# Patient Record
Sex: MALE | Race: WHITE | NOT HISPANIC OR LATINO | Employment: OTHER | URBAN - METROPOLITAN AREA
[De-identification: names, ages, dates, MRNs, and addresses within clinical notes are randomized per-mention and may not be internally consistent; named-entity substitution may affect disease eponyms.]

---

## 2017-01-23 DIAGNOSIS — J38.7 LARYNX DISORDER: Primary | ICD-10-CM

## 2017-05-23 ENCOUNTER — TRANSFERRED RECORDS (OUTPATIENT)
Dept: HEALTH INFORMATION MANAGEMENT | Facility: CLINIC | Age: 62
End: 2017-05-23

## 2017-05-23 ENCOUNTER — RECORDS - HEALTHEAST (OUTPATIENT)
Dept: LAB | Facility: CLINIC | Age: 62
End: 2017-05-23

## 2017-05-23 LAB
ALT SERPL-CCNC: 29 U/L (ref 0–45)
AST SERPL-CCNC: 33 U/L (ref 0–40)
CHOLEST SERPL-MCNC: 157 MG/DL
CREAT SERPL-MCNC: 1.11 MG/DL (ref 0.7–1.3)
FASTING STATUS PATIENT QL REPORTED: YES
GFR SERPL CREATININE-BSD FRML MDRD: >60 ML/MIN/1.73M2
GLUCOSE SERPL-MCNC: 85 MG/DL (ref 70–125)
HDLC SERPL-MCNC: 47 MG/DL
LDLC SERPL CALC-MCNC: 98 MG/DL
POTASSIUM SERPL-SCNC: 5.2 MMOL/L (ref 3.5–5)
PSA SERPL-MCNC: 0.4 NG/ML (ref 0–4.5)
TRIGL SERPL-MCNC: 60 MG/DL

## 2017-09-13 DIAGNOSIS — J38.7: Primary | ICD-10-CM

## 2017-10-02 ENCOUNTER — TRANSFERRED RECORDS (OUTPATIENT)
Dept: HEALTH INFORMATION MANAGEMENT | Facility: CLINIC | Age: 62
End: 2017-10-02

## 2017-10-20 ENCOUNTER — TRANSFERRED RECORDS (OUTPATIENT)
Dept: HEALTH INFORMATION MANAGEMENT | Facility: CLINIC | Age: 62
End: 2017-10-20

## 2017-11-21 ENCOUNTER — OFFICE VISIT (OUTPATIENT)
Dept: PEDIATRICS | Facility: CLINIC | Age: 62
End: 2017-11-21
Payer: COMMERCIAL

## 2017-11-21 VITALS
WEIGHT: 148 LBS | OXYGEN SATURATION: 98 % | DIASTOLIC BLOOD PRESSURE: 70 MMHG | SYSTOLIC BLOOD PRESSURE: 118 MMHG | HEIGHT: 62 IN | BODY MASS INDEX: 27.23 KG/M2 | HEART RATE: 63 BPM

## 2017-11-21 DIAGNOSIS — E78.5 HYPERLIPIDEMIA LDL GOAL <130: ICD-10-CM

## 2017-11-21 DIAGNOSIS — I10 BENIGN ESSENTIAL HYPERTENSION: Primary | ICD-10-CM

## 2017-11-21 PROCEDURE — 99214 OFFICE O/P EST MOD 30 MIN: CPT | Performed by: INTERNAL MEDICINE

## 2017-11-21 NOTE — MR AVS SNAPSHOT
"              After Visit Summary   11/21/2017    Mickey Borden    MRN: [id number]           Patient Information     Date Of Birth          1955        Visit Information        Provider Department      11/21/2017 7:30 AM Gallito Barba MD [facility] [place]        Today's Diagnoses     Benign essential hypertension    -  1    Hyperlipidemia LDL goal <130          Care Instructions    Continue with your current medications    Next visit next spring for a routine physical with fasting labwork           Follow-ups after your visit        Who to contact     If you have questions or need follow up information about today's clinic visit or your schedule please contact [facility] directly at [phone].  Normal or non-critical lab and imaging results will be communicated to you by MyChart, letter or phone within 4 business days after the clinic has received the results. If you do not hear from us within 7 days, please contact the clinic through Servicefulhart or phone. If you have a critical or abnormal lab result, we will notify you by phone as soon as possible.  Submit refill requests through Baoku or call your pharmacy and they will forward the refill request to us. Please allow 3 business days for your refill to be completed.          Additional Information About Your Visit        MyChart Information     Baoku gives you secure access to your electronic health record. If you see a primary care provider, you can also send messages to your care team and make appointments. If you have questions, please call your primary care clinic.  If you do not have a primary care provider, please call [phone] and they will assist you.        Care EveryWhere ID     This is your Care EveryWhere ID. This could be used by other organizations to access your [place] medical records  [id number]        Your Vitals Were     Pulse Height Pulse Oximetry BMI (Body Mass Index)          63 5' 1.5\" (1.562 m) 98% 27.51 " kg/m2         Blood Pressure from Last 3 Encounters:   11/21/17 118/70   12/23/14 (!) 160/111   12/18/14 160/89    Weight from Last 3 Encounters:   11/21/17 148 lb (67.1 kg)   12/23/14 172 lb (78 kg)   12/18/14 170 lb (77.1 kg)              Today, you had the following     No orders found for display       Primary Care Provider Office Phone # Fax #    Gallito Barba [phone] [address] [place] [address]        Equal Access to Services     [facility]: Hadii aad ku hadasho Soomaali, waaxda luqadaha, qaybta kaalmada adeheikeyaneftali, vinayak mora . So [facility] [phone].    ATENCIÓN: Si habla español, tiene a jefferson disposición servicios gratuitos de asistencia lingüística. [facility] [phone].    We comply with applicable federal civil rights laws and [place] laws. We do not discriminate on the basis of race, color, national origin, age, disability, sex, sexual orientation, or gender identity.            Thank you!     Thank you for choosing [facility]  for your care. Our goal is always to provide you with excellent care. Hearing back from our patients is one way we can continue to improve our services. Please take a few minutes to complete the written survey that you may receive in the mail after your visit with us. Thank you!             Your Updated Medication List - Protect others around you: Learn how to safely use, store and throw away your medicines at www.disposemymeds.org.          This list is accurate as of: 11/21/17  7:55 AM.  Always use your most recent med list.                   Brand Name Dispense Instructions for use Diagnosis    aspirin 81 MG tablet      Take 1 tablet by mouth daily        BOTOX 100 UNITS injection   Generic drug:  botulinum toxin type A     0.25 each    Inject 0.25 units    Larynx disease       CARTIA  MG 24 hr capsule   Generic drug:  diltiazem      Take 120 mg by mouth daily.        DAILY VITAMIN PO       Take  by mouth daily.        fish Oil 1200 MG capsule      Take 1 tablet by mouth daily        SAW PALMETTO CONCENTRATE OR

## 2017-11-21 NOTE — PATIENT INSTRUCTIONS
Continue with your current medications    Next visit next spring for a routine physical with fasting labwork

## 2017-11-21 NOTE — PROGRESS NOTES
"  SUBJECTIVE:   Mickey Borden is a 61 year old male who presents to clinic today for the following health issues:    New Patient/Transfer of Care-  Go over past medical history.     HTN. Taking Cartia. No cardiac sx such as CP, palpitations, PND, orthopnea, GARCIA or peripheral edema.     Hx of elevated lipids. Now off rx meds - was taking pravastatin. Does take fish oil.    Hx of recurrent conjunctivitis. Treated w/ sufa drops prn.     Hx of macrocytosis, mild. In May had CPE w/ labs including normal B12 and folate.     Most recent colonoscopy 1 month ago at [facility].    Problem list and histories reviewed & adjusted, as indicated.    Labs reviewed from outside records and through Care Everywhere.     Reviewed and updated as needed this visit by clinical staff  Tobacco  Allergies  Meds  Med Hx  Surg Hx  Fam Hx  Soc Hx      Reviewed and updated as needed this visit by Provider  Tobacco  Allergies  Meds  Problems  Med Hx  Surg Hx  Fam Hx  Soc Hx          ROS:  Constitutional, HEENT, cardiovascular, pulmonary, gi and gu systems are negative, except as otherwise noted.      OBJECTIVE:   /70 (Cuff Size: Adult Regular)  Pulse 63  Ht 5' 1.5\" (1.562 m)  Wt 148 lb (67.1 kg)  SpO2 98%  BMI 27.51 kg/m2  Body mass index is 27.51 kg/(m^2).  GENERAL: healthy, alert and no distress  EYES: Eyes grossly normal to inspection, PERRL and conjunctivae and sclerae normal  HENT: ear canals and TM's normal, nose and mouth without ulcers or lesions  NECK: no adenopathy, no asymmetry, masses, or scars and thyroid normal to palpation. No bruits.   RESP: lungs clear to auscultation - no rales, rhonchi or wheezes  CV: regular rate and rhythm, normal S1 S2, no S3 or S4, no murmur, click or rub, no peripheral edema and peripheral pulses strong  MS: no gross musculoskeletal defects noted, no edema  SKIN: no suspicious lesions or rashes  PSYCH: mentation appears normal, affect normal/bright    ASSESSMENT/PLAN:       " ICD-10-CM    1. Benign essential hypertension I10    2. Hyperlipidemia LDL goal <130 E78.5      New pt to the clinic.  HTN - well controlled. Continue w/ current medications and lifestyle management.  Hyperlipidemia - off medications. Plan to recheck lipids next spring.       Gallito Barba MD  [facility]

## 2017-11-21 NOTE — NURSING NOTE
"Chief Complaint   Patient presents with     Establish Care       Initial /70 (Cuff Size: Adult Regular)  Pulse 63  Ht 5' 1.5\" (1.562 m)  Wt 148 lb (67.1 kg)  SpO2 98%  BMI 27.51 kg/m2 Estimated body mass index is 27.51 kg/(m^2) as calculated from the following:    Height as of this encounter: 5' 1.5\" (1.562 m).    Weight as of this encounter: 148 lb (67.1 kg).  Medication Reconciliation: jean Lazo   "

## 2018-01-09 ENCOUNTER — TRANSFERRED RECORDS (OUTPATIENT)
Dept: HEALTH INFORMATION MANAGEMENT | Facility: CLINIC | Age: 63
End: 2018-01-09

## 2018-07-10 ENCOUNTER — TRANSFERRED RECORDS (OUTPATIENT)
Dept: HEALTH INFORMATION MANAGEMENT | Facility: CLINIC | Age: 63
End: 2018-07-10

## 2018-08-17 ENCOUNTER — TELEPHONE (OUTPATIENT)
Dept: PEDIATRICS | Facility: CLINIC | Age: 63
End: 2018-08-17

## 2018-08-17 NOTE — TELEPHONE ENCOUNTER
8/17/18 gabrielle - called Mickey to verify his appt for MON 8/20/18 and he has requested that his preferred pharmacy be changed from [facility] to [facility] for all future scripts. Please be sure to update for refills/new Rx to be written on MON. Thanks!

## 2018-08-17 NOTE — TELEPHONE ENCOUNTER
Pharmacy changed in chart, deleted [facility]. Encounter closed. Kelin Petersen, [facility]

## 2018-08-20 ENCOUNTER — OFFICE VISIT (OUTPATIENT)
Dept: PEDIATRICS | Facility: CLINIC | Age: 63
End: 2018-08-20
Payer: COMMERCIAL

## 2018-08-20 VITALS
DIASTOLIC BLOOD PRESSURE: 70 MMHG | SYSTOLIC BLOOD PRESSURE: 122 MMHG | HEIGHT: 61 IN | OXYGEN SATURATION: 96 % | HEART RATE: 67 BPM | TEMPERATURE: 98 F | BODY MASS INDEX: 28.83 KG/M2 | WEIGHT: 152.7 LBS | RESPIRATION RATE: 14 BRPM

## 2018-08-20 DIAGNOSIS — Z00.00 ROUTINE GENERAL MEDICAL EXAMINATION AT A HEALTH CARE FACILITY: Primary | ICD-10-CM

## 2018-08-20 DIAGNOSIS — I10 BENIGN ESSENTIAL HYPERTENSION: ICD-10-CM

## 2018-08-20 LAB
ALBUMIN SERPL-MCNC: 4.1 G/DL (ref 3.4–5)
ALP SERPL-CCNC: 52 U/L (ref 40–150)
ALT SERPL W P-5'-P-CCNC: 33 U/L (ref 0–70)
ANION GAP SERPL CALCULATED.3IONS-SCNC: 6 MMOL/L (ref 3–14)
AST SERPL W P-5'-P-CCNC: 32 U/L (ref 0–45)
BILIRUB SERPL-MCNC: 0.5 MG/DL (ref 0.2–1.3)
BUN SERPL-MCNC: 14 MG/DL (ref 7–30)
CALCIUM SERPL-MCNC: 8.5 MG/DL (ref 8.5–10.1)
CHLORIDE SERPL-SCNC: 108 MMOL/L (ref 94–109)
CHOLEST SERPL-MCNC: 204 MG/DL
CO2 SERPL-SCNC: 27 MMOL/L (ref 20–32)
CREAT SERPL-MCNC: 1.06 MG/DL (ref 0.66–1.25)
GFR SERPL CREATININE-BSD FRML MDRD: 70 ML/MIN/1.7M2
GLUCOSE SERPL-MCNC: 97 MG/DL (ref 70–99)
HCV AB SERPL QL IA: NONREACTIVE
HDLC SERPL-MCNC: 51 MG/DL
HIV 1+2 AB+HIV1 P24 AG SERPL QL IA: NONREACTIVE
LDLC SERPL CALC-MCNC: 138 MG/DL
NONHDLC SERPL-MCNC: 153 MG/DL
POTASSIUM SERPL-SCNC: 4 MMOL/L (ref 3.4–5.3)
PROT SERPL-MCNC: 7.4 G/DL (ref 6.8–8.8)
PSA SERPL-ACNC: 0.54 UG/L (ref 0–4)
SODIUM SERPL-SCNC: 141 MMOL/L (ref 133–144)
TRIGL SERPL-MCNC: 77 MG/DL

## 2018-08-20 PROCEDURE — 87389 HIV-1 AG W/HIV-1&-2 AB AG IA: CPT | Performed by: INTERNAL MEDICINE

## 2018-08-20 PROCEDURE — 99396 PREV VISIT EST AGE 40-64: CPT | Performed by: INTERNAL MEDICINE

## 2018-08-20 PROCEDURE — 36415 COLL VENOUS BLD VENIPUNCTURE: CPT | Performed by: INTERNAL MEDICINE

## 2018-08-20 PROCEDURE — G0103 PSA SCREENING: HCPCS | Performed by: INTERNAL MEDICINE

## 2018-08-20 PROCEDURE — 86803 HEPATITIS C AB TEST: CPT | Performed by: INTERNAL MEDICINE

## 2018-08-20 PROCEDURE — 80061 LIPID PANEL: CPT | Performed by: INTERNAL MEDICINE

## 2018-08-20 PROCEDURE — 80053 COMPREHEN METABOLIC PANEL: CPT | Performed by: INTERNAL MEDICINE

## 2018-08-20 RX ORDER — DILTIAZEM HYDROCHLORIDE 120 MG/1
120 CAPSULE, COATED, EXTENDED RELEASE ORAL DAILY
Qty: 90 CAPSULE | Refills: 3 | Status: SHIPPED | OUTPATIENT
Start: 2018-08-20 | End: 2019-08-25

## 2018-08-20 ASSESSMENT — ENCOUNTER SYMPTOMS
PARESTHESIAS: 0
HEMATURIA: 0
NAUSEA: 0
FREQUENCY: 0
HEADACHES: 0
CONSTIPATION: 0
ARTHRALGIAS: 0
PALPITATIONS: 0
SORE THROAT: 0
JOINT SWELLING: 0
DIARRHEA: 0
WEAKNESS: 0
HEMATOCHEZIA: 0
COUGH: 0
CHILLS: 0
EYE PAIN: 0
ABDOMINAL PAIN: 0
DIZZINESS: 0
HEARTBURN: 0
DYSURIA: 0
SHORTNESS OF BREATH: 0
MYALGIAS: 0

## 2018-08-20 NOTE — PATIENT INSTRUCTIONS
Preventive Health Recommendations    Yearly exam:             See your health care provider every year in order to  o   Review health changes.   o   Discuss preventive care.    o   Review your medicines.     Have a cholesterol test every 1-2 years.     Have a diabetes test (fasting glucose) every 1-2 years.    Have a colonoscopy again in 2022.     Shots: Get a flu shot each year. Get a tetanus shot every 10 years.     Nutrition:    Eat at least 5 servings of fruits and vegetables daily.     Eat whole-grain bread, whole-wheat pasta and brown rice instead of white grains and rice.     Get adequate Calcium and Vitamin D.     Lifestyle    Exercise for at least 150 minutes a week (30 minutes a day, 5 days a week). This will help you control your weight and prevent disease.     Limit alcohol to one drink per day.     No smoking.     Wear sunscreen to prevent skin cancer.     See your dentist every six months for an exam and cleaning.     See your eye doctor every 1 to 2 years.

## 2018-08-20 NOTE — PROGRESS NOTES
SUBJECTIVE:   CC: Mickey Borden is an 62 year old male who presents for preventative health visit.     Physical   Annual:     Getting at least 3 servings of Calcium per day:  Yes    Bi-annual eye exam:  Yes    Dental care twice a year:  Yes    Sleep apnea or symptoms of sleep apnea:  None    Diet:  Low salt, Low fat/cholesterol and Carbohydrate counting    Frequency of exercise:  4-5 days/week    Duration of exercise:  45-60 minutes    Taking medications regularly:  Yes    Medication side effects:  Not applicable    Additional concerns today:  No    HTN. Treating w/ Diltiazem.  No cardiac sx such as CP, palpitations, PND, orthopnea, GARCIA or peripheral edema.  BP Readings from Last 3 Encounters:   08/20/18 122/70   11/21/17 118/70   12/23/14 (!) 160/111     Hyperlipidemia. Did not tolerate meds - fatigue, muscle sx.     Today's PHQ-2 Score:   PHQ-2 ( 1999 [employer]) 8/20/2018   Q1: Little interest or pleasure in doing things 0   Q2: Feeling down, depressed or hopeless 0   PHQ-2 Score 0   Q1: Little interest or pleasure in doing things Not at all   Q2: Feeling down, depressed or hopeless Not at all   PHQ-2 Score 0       Abuse: Current or Past(Physical, Sexual or Emotional)- No  Do you feel safe in your environment - Yes    Social History   Substance Use Topics     Smoking status: Never Smoker     Smokeless tobacco: Never Used     Alcohol use Yes      Comment: moderate     Alcohol Use 8/20/2018   If you drink alcohol do you typically have greater than 3 drinks per day OR greater than 7 drinks per week? No   No flowsheet data found.    Last PSA: No results found for: PSA    Reviewed orders with patient. Reviewed health maintenance and updated orders accordingly - Yes  Labs reviewed in EPIC    Reviewed and updated as needed this visit by clinical staff  Tobacco  Allergies  Meds  Med Hx  Surg Hx  Fam Hx  Soc Hx        Reviewed and updated as needed this visit by Provider  Tobacco  Allergies  Meds  Problems  Med  "Hx  Surg Hx  Fam Hx  Soc Hx           Review of Systems   Constitutional: Negative for chills.   HENT: Negative for congestion, ear pain, hearing loss and sore throat.    Eyes: Negative for pain and visual disturbance.   Respiratory: Negative for cough and shortness of breath.    Cardiovascular: Negative for chest pain, palpitations and peripheral edema.   Gastrointestinal: Negative for abdominal pain, constipation, diarrhea, heartburn, hematochezia and nausea.   Genitourinary: Negative for discharge, dysuria, frequency, genital sores, hematuria, impotence and urgency.   Musculoskeletal: Negative for arthralgias, joint swelling and myalgias.   Skin: Negative for rash.   Neurological: Negative for dizziness, weakness, headaches and paresthesias.   Psychiatric/Behavioral: Negative for mood changes.     : Nocturia, up to every 1 hour. Slower daytime stream.     OBJECTIVE:   /70 (BP Location: Right arm, Patient Position: Chair, Cuff Size: Adult Regular)  Pulse 67  Temp 98  F (36.7  C) (Tympanic)  Resp 14  Ht 5' 1.25\" (1.556 m)  Wt 152 lb 11.2 oz (69.3 kg)  SpO2 96%  BMI 28.62 kg/m2    Physical Exam  GENERAL: healthy, alert and no distress  EYES: Eyes grossly normal to inspection, PERRL and conjunctivae and sclerae normal  HENT: ear canals and TM's normal, nose and mouth without ulcers or lesions  NECK: no adenopathy, no asymmetry, masses, or scars and thyroid normal to palpation. No bruits.   RESP: lungs clear to auscultation - no rales, rhonchi or wheezes  CV: regular rate and rhythm, normal S1 S2, no S3 or S4, no murmur, click or rub, no peripheral edema and peripheral pulses strong  ABDOMEN: soft, nontender, no hepatosplenomegaly, no masses and bowel sounds normal  MS: no gross musculoskeletal defects noted, no edema  SKIN: no suspicious lesions or rashes  NEURO: Normal strength and tone, mentation intact and speech normal  PSYCH: mentation appears normal, affect normal/bright      ASSESSMENT/PLAN: " "      ICD-10-CM    1. Routine general medical examination at a health care facility Z00.00 Comprehensive metabolic panel     Lipid panel reflex to direct LDL Fasting     Prostate spec antigen screen     HONORING CHOICES REFERRAL     **Hepatitis C Screen Reflex to RNA FUTURE anytime     HIV Antigen Antibody Combo   2. Benign essential hypertension I10 diltiazem (CARTIA XT) 120 MG 24 hr capsule       COUNSELING:   Reviewed preventive health counseling, as reflected in patient instructions    BP Readings from Last 1 Encounters:   08/20/18 122/70     Estimated body mass index is 28.62 kg/(m^2) as calculated from the following:    Height as of this encounter: 5' 1.25\" (1.556 m).    Weight as of this encounter: 152 lb 11.2 oz (69.3 kg).      Weight management plan: Discussed healthy diet and exercise guidelines and patient will follow up in 12 months in clinic to re-evaluate.     reports that he has never smoked. He has never used smokeless tobacco.    Gallito Barba MD  [facility]  "

## 2018-08-20 NOTE — MR AVS SNAPSHOT
After Visit Summary   8/20/2018    Mickey Borden    MRN: [id number]           Patient Information     Date Of Birth          1955        Visit Information        Provider Department      8/20/2018 7:30 AM Gallito Barba MD [facility] [place]        Today's Diagnoses     Routine general medical examination at a health care facility    -  1    Benign essential hypertension          Care Instructions      Preventive Health Recommendations    Yearly exam:             See your health care provider every year in order to  o   Review health changes.   o   Discuss preventive care.    o   Review your medicines.     Have a cholesterol test every 1-2 years.     Have a diabetes test (fasting glucose) every 1-2 years.    Have a colonoscopy again in 2022.     Shots: Get a flu shot each year. Get a tetanus shot every 10 years.     Nutrition:    Eat at least 5 servings of fruits and vegetables daily.     Eat whole-grain bread, whole-wheat pasta and brown rice instead of white grains and rice.     Get adequate Calcium and Vitamin D.     Lifestyle    Exercise for at least 150 minutes a week (30 minutes a day, 5 days a week). This will help you control your weight and prevent disease.     Limit alcohol to one drink per day.     No smoking.     Wear sunscreen to prevent skin cancer.     See your dentist every six months for an exam and cleaning.     See your eye doctor every 1 to 2 years.            Follow-ups after your visit        Additional Services     ABELARDO MAJOR REFERRAL       Your provider has referred you to [facility]patient [facility] Advance Care Planning Facilitator or Serious illness clinic support staff. The facilitator or support staff will contact you to schedule the appointment or for the follow up call    Reason for Referral: Basic Advance Care Planning - 1:1 need                  Who to contact     If you have questions or need follow up information about today's clinic visit or your  "schedule please contact [facility] directly at [phone].  Normal or non-critical lab and imaging results will be communicated to you by MyChart, letter or phone within 4 business days after the clinic has received the results. If you do not hear from us within 7 days, please contact the clinic through [employer] or phone. If you have a critical or abnormal lab result, we will notify you by phone as soon as possible.  Submit refill requests through SweetL[employer] or call your pharmacy and they will forward the refill request to us. Please allow 3 business days for your refill to be completed.          Additional Information About Your Visit        [employer] Information     SweetL[employer] gives you secure access to your electronic health record. If you see a primary care provider, you can also send messages to your care team and make appointments. If you have questions, please call your primary care clinic.  If you do not have a primary care provider, please call [phone] and they will assist you.        Care EveryWhere ID     This is your Care EveryWhere ID. This could be used by other organizations to access your [facility] medical records  [id number]        Your Vitals Were     Pulse Temperature Respirations Height Pulse Oximetry BMI (Body Mass Index)    67 98  F (36.7  C) (Tympanic) 14 5' 1.25\" (1.556 m) 96% 28.62 kg/m2       Blood Pressure from Last 3 Encounters:   08/20/18 122/70   11/21/17 118/70   12/23/14 (!) 160/111    Weight from Last 3 Encounters:   08/20/18 152 lb 11.2 oz (69.3 kg)   11/21/17 148 lb (67.1 kg)   12/23/14 172 lb (78 kg)              We Performed the Following     **Hepatitis C Screen Reflex to RNA FUTURE anytime     Comprehensive metabolic panel     HIV Antigen Antibody Combo     HONORING CHOICES REFERRAL     Lipid panel reflex to direct LDL Fasting     Prostate spec antigen screen          Where to get your medicines      These [facility] to [facility] - [address]   [address], [place] [address]     Phone:  [phone]     diltiazem 120 MG 24 hr capsule          Primary Care Provider Office Phone # Fax #    Gallito Barba [phone] [address] [place] [address]        Equal Access to Services     [facility]: Hadii aad ku hadasho Soomaali, waaxda luqadaha, qaybta kaalmada adeegyada, waxay idiin hayaan adeeg kharash la'aan . So [facility] [phone].    ATENCIÓN: Si habla español, tiene a jefferson disposición servicios gratuitos de asistencia lingüística. [facility] [phone].    We comply with applicable federal civil rights laws and [place] laws. We do not discriminate on the basis of race, color, national origin, age, disability, sex, sexual orientation, or gender identity.            Thank you!     Thank you for choosing [facility]  for your care. Our goal is always to provide you with excellent care. Hearing back from our patients is one way we can continue to improve our services. Please take a few minutes to complete the written survey that you may receive in the mail after your visit with us. Thank you!             Your Updated Medication List - Protect others around you: Learn how to safely use, store and throw away your medicines at www.disposemymeds.org.          This list is accurate as of 8/20/18  7:52 AM.  Always use your most recent med list.                   Brand Name Dispense Instructions for use Diagnosis    aspirin 81 MG tablet      Take 1 tablet by mouth daily        BOTOX 100 units injection   Generic drug:  botulinum toxin type A     0.25 each    Inject 0.25 units    Larynx disease       DAILY VITAMIN PO      Take  by mouth daily.        diltiazem 120 MG 24 hr capsule    CARTIA XT    90 capsule    Take 1 capsule (120 mg) by mouth daily    Benign essential hypertension       fish Oil 1200 MG capsule      Take 1 tablet by mouth daily        SAW PALMETTO  CONCENTRATE OR

## 2018-08-28 ENCOUNTER — TRANSFERRED RECORDS (OUTPATIENT)
Dept: HEALTH INFORMATION MANAGEMENT | Facility: CLINIC | Age: 63
End: 2018-08-28

## 2018-08-28 ENCOUNTER — TELEPHONE (OUTPATIENT)
Dept: PEDIATRICS | Facility: CLINIC | Age: 63
End: 2018-08-28

## 2018-09-25 ENCOUNTER — TRANSFERRED RECORDS (OUTPATIENT)
Dept: HEALTH INFORMATION MANAGEMENT | Facility: CLINIC | Age: 63
End: 2018-09-25

## 2018-11-27 ENCOUNTER — TRANSFERRED RECORDS (OUTPATIENT)
Dept: HEALTH INFORMATION MANAGEMENT | Facility: CLINIC | Age: 63
End: 2018-11-27

## 2018-12-17 ENCOUNTER — OFFICE VISIT (OUTPATIENT)
Dept: PEDIATRICS | Facility: CLINIC | Age: 63
End: 2018-12-17
Payer: COMMERCIAL

## 2018-12-17 ENCOUNTER — ANCILLARY PROCEDURE (OUTPATIENT)
Dept: GENERAL RADIOLOGY | Facility: CLINIC | Age: 63
End: 2018-12-17
Attending: INTERNAL MEDICINE
Payer: COMMERCIAL

## 2018-12-17 ENCOUNTER — TELEPHONE (OUTPATIENT)
Dept: PEDIATRICS | Facility: CLINIC | Age: 63
End: 2018-12-17

## 2018-12-17 VITALS
DIASTOLIC BLOOD PRESSURE: 74 MMHG | BODY MASS INDEX: 29.59 KG/M2 | WEIGHT: 156.7 LBS | SYSTOLIC BLOOD PRESSURE: 122 MMHG | RESPIRATION RATE: 16 BRPM | HEIGHT: 61 IN | HEART RATE: 58 BPM | TEMPERATURE: 97.7 F | OXYGEN SATURATION: 98 %

## 2018-12-17 DIAGNOSIS — R53.83 FATIGUE, UNSPECIFIED TYPE: ICD-10-CM

## 2018-12-17 DIAGNOSIS — R06.02 SHORTNESS OF BREATH: ICD-10-CM

## 2018-12-17 DIAGNOSIS — R53.83 FATIGUE, UNSPECIFIED TYPE: Primary | ICD-10-CM

## 2018-12-17 LAB
ERYTHROCYTE [DISTWIDTH] IN BLOOD BY AUTOMATED COUNT: 12.7 % (ref 10–15)
HCT VFR BLD AUTO: 44 % (ref 40–53)
HGB BLD-MCNC: 14.7 G/DL (ref 13.3–17.7)
MCH RBC QN AUTO: 31.5 PG (ref 26.5–33)
MCHC RBC AUTO-ENTMCNC: 33.4 G/DL (ref 31.5–36.5)
MCV RBC AUTO: 94 FL (ref 78–100)
PLATELET # BLD AUTO: 142 10E9/L (ref 150–450)
RBC # BLD AUTO: 4.66 10E12/L (ref 4.4–5.9)
TSH SERPL DL<=0.005 MIU/L-ACNC: 2.21 MU/L (ref 0.4–4)
WBC # BLD AUTO: 6 10E9/L (ref 4–11)

## 2018-12-17 PROCEDURE — 93000 ELECTROCARDIOGRAM COMPLETE: CPT | Performed by: INTERNAL MEDICINE

## 2018-12-17 PROCEDURE — 36415 COLL VENOUS BLD VENIPUNCTURE: CPT | Performed by: INTERNAL MEDICINE

## 2018-12-17 PROCEDURE — 99214 OFFICE O/P EST MOD 30 MIN: CPT | Performed by: INTERNAL MEDICINE

## 2018-12-17 PROCEDURE — 71046 X-RAY EXAM CHEST 2 VIEWS: CPT

## 2018-12-17 PROCEDURE — 84443 ASSAY THYROID STIM HORMONE: CPT | Performed by: INTERNAL MEDICINE

## 2018-12-17 PROCEDURE — 85027 COMPLETE CBC AUTOMATED: CPT | Performed by: INTERNAL MEDICINE

## 2018-12-17 ASSESSMENT — MIFFLIN-ST. JEOR: SCORE: 1364.17

## 2018-12-17 NOTE — PROGRESS NOTES
SUBJECTIVE:   Mickey Borden is a 63 year old male who presents to clinic today for the following health issues:    Dizziness and SOB  Onset: 4-5 Days while exercising    Description:   Do you feel faint:  no   Does it feel like the surroundings (bed, room) are moving: no   Unsteady/off balance: no   Have you passed out or fallen: no     Intensity: 7/10-During Episode    Progression of Symptoms:  Intermittent while exercising     Accompanying Signs & Symptoms:  Heart palpitations: YES-While Exercising   Nausea, vomiting: no   Weakness in arms or legs: no   Fatigue: YES-Has felt off the last 2-3 Days  Vision or speech changes: no   Ringing in ears (Tinnitus): no   Hearing Loss: no     History:   Head trauma/concussion hx: no   Previous similar symptoms: YES- Pt states in 2015 he had a sensation of Heart Pounding-States it was due to his high blood pressure  Recent bleeding history: no     Precipitating factors:   Worse with activity or head movement: no   Any new medications (BP?): no   Alcohol/drug abuse/withdrawal: no     Alleviating factors:   Does staying in a fixed position give relief:  YES    Therapies Tried and outcome: No therapies have been tried     Concern about weight.  Wt Readings from Last 4 Encounters:   12/17/18 71.1 kg (156 lb 11.2 oz)   08/20/18 69.3 kg (152 lb 11.2 oz)   11/21/17 67.1 kg (148 lb)   12/23/14 78 kg (172 lb)     Has noted sx over the past few months.  This morning, had more sx than typical when he was exercising.  Had to stop twice on the elliptical (typically goes 30 minutes 5 days per week). Has been stopping once every few days over the past few weeks.  Had some dizziness w/ his exercise this am.    No prior lung or heart disease noted.  Does have vocal cord dysfunction, botox injections every few weeks. In Nov had an injection which did not work well, repeated after 2 weeks.    Problem list and histories reviewed & adjusted, as indicated.      Labs reviewed in  "EPIC    Reviewed and updated as needed this visit by Provider  Tobacco  Allergies  Meds  Problems  Med Hx  Surg Hx  Fam Hx         ROS:  Constitutional, HEENT, cardiovascular, pulmonary, gi and gu systems are negative, except as otherwise noted.    OBJECTIVE:     /74 (BP Location: Right arm, Patient Position: Chair, Cuff Size: Adult Regular)   Pulse 58   Temp 97.7  F (36.5  C) (Oral)   Resp 16   Ht 1.549 m (5' 1\")   Wt 71.1 kg (156 lb 11.2 oz)   SpO2 98%   BMI 29.61 kg/m    Body mass index is 29.61 kg/m .  GEN: no distress  SKIN: no rashes  HEENT: PERRL. EOMI. TM's clear bilaterally. Nasal mucosa normal. OP moist without lesions.  NECK: Supple. No LAD or TM. No bruits.  LUNGS: Clear to auscultation bilaterally. No rhonchi, rales, wheezes or retractions.  CV: Regular rate and rhythm.  No murmurs, rubs or gallops. Pulses 2+ radial.  ABD: BS+. S, ND, NT.  EXTR: No edema  PSYCH: Normal affect. Well groomed. Good eye contact.   NEURO: no focal deficits    EKG: Normal sinus rhythm. No acute ST or T changes. RSR' in V1 and V2. No prior EKG for comparison.    Recent Results (from the past 744 hour(s))   XR Chest 2 Views    Narrative    CHEST TWO VIEWS  12/17/2018 10:47 AM     HISTORY:  Fatigue, unspecified type. Shortness of breath.    COMPARISON: None.      Impression    IMPRESSION: Tortuous aorta. Otherwise normal.    IVIS LOWE MD     Results for orders placed or performed in visit on 12/17/18   CBC with platelets   Result Value Ref Range    WBC 6.0 4.0 - 11.0 10e9/L    RBC Count 4.66 4.4 - 5.9 10e12/L    Hemoglobin 14.7 13.3 - 17.7 g/dL    Hematocrit 44.0 40.0 - 53.0 %    MCV 94 78 - 100 fl    MCH 31.5 26.5 - 33.0 pg    MCHC 33.4 31.5 - 36.5 g/dL    RDW 12.7 10.0 - 15.0 %    Platelet Count 142 (L) 150 - 450 10e9/L   TSH with free T4 reflex   Result Value Ref Range    TSH 2.21 0.40 - 4.00 mU/L        ASSESSMENT/PLAN:       ICD-10-CM    1. Fatigue, unspecified type R53.83 EKG 12-lead complete " w/read - Clinics     CBC with platelets     TSH with free T4 reflex     XR Chest 2 Views     SLEEP EVALUATION & MANAGEMENT REFERRAL - ADULT -[facility] [place]  [phone] (Age 18 and up)   2. Shortness of breath R06.02 EKG 12-lead complete w/read - Clinics     CBC with platelets     TSH with free T4 reflex     XR Chest 2 Views     Cause for fatigue and SOB is not clear. No clear cause w/ evaluation done today - no lung mass, infection, normal diaphragms, not anemic, TSH is normal, essentially normal EKG.  Could be sleep related - referral for a sleep evaluation placed (?leg movement or sleep apnea)  If sx continue to be significant w/ exertion, will place an order for stress testing. Call if he would like this ordered.     Gallito Barba MD  [facility]

## 2018-12-17 NOTE — TELEPHONE ENCOUNTER
Pt notifies the following:     - has been feeling dizzy & moderate SOB while exercising lately(4-5 days)  - does 30-40 mins aerobic exercises every morning  - denies chest pain/tightness, GARCIA, HA, palpitations, confusion, vision changes, weakness in one side of face/body, diaphoresis, lethargy, vomiting, nausea, pain radiating to jaw/shoulder/hand or dehydration sx's  - takes his diltiazem every day  - doesn't checks his BP at home  - lost some weight lately(unintentional)     Scheduled an appointment with  today for an eval. If sx's get worse, advised to notify us. Pt agrees to the plan.    Frida, RN  Triage Nurse

## 2018-12-17 NOTE — PATIENT INSTRUCTIONS
A sleep evaluation may be helpful:  [facility]  [phone]    Call if you continue to have symptoms with exertion  If so, a stress test should be ordered

## 2018-12-21 ENCOUNTER — MYC MEDICAL ADVICE (OUTPATIENT)
Dept: PEDIATRICS | Facility: CLINIC | Age: 63
End: 2018-12-21

## 2018-12-21 DIAGNOSIS — R35.1 BENIGN PROSTATIC HYPERPLASIA WITH NOCTURIA: Primary | ICD-10-CM

## 2018-12-21 DIAGNOSIS — N40.1 BENIGN PROSTATIC HYPERPLASIA WITH NOCTURIA: Primary | ICD-10-CM

## 2018-12-21 RX ORDER — TAMSULOSIN HYDROCHLORIDE 0.4 MG/1
0.4 CAPSULE ORAL DAILY
Qty: 30 CAPSULE | Refills: 2 | Status: SHIPPED | OUTPATIENT
Start: 2018-12-21 | End: 2019-03-17

## 2018-12-21 NOTE — TELEPHONE ENCOUNTER
Pt sent a [employer] message stating that he forgot to address something with you at his recent visit.  Please see [employer] message.    Please advise-    Brissa Montoya RN

## 2019-01-22 ENCOUNTER — TRANSFERRED RECORDS (OUTPATIENT)
Dept: HEALTH INFORMATION MANAGEMENT | Facility: CLINIC | Age: 64
End: 2019-01-22

## 2019-02-26 ENCOUNTER — TRANSFERRED RECORDS (OUTPATIENT)
Dept: HEALTH INFORMATION MANAGEMENT | Facility: CLINIC | Age: 64
End: 2019-02-26

## 2019-03-17 DIAGNOSIS — R35.1 BENIGN PROSTATIC HYPERPLASIA WITH NOCTURIA: ICD-10-CM

## 2019-03-17 DIAGNOSIS — N40.1 BENIGN PROSTATIC HYPERPLASIA WITH NOCTURIA: ICD-10-CM

## 2019-03-17 NOTE — TELEPHONE ENCOUNTER
"Requested Prescriptions   Pending Prescriptions Disp Refills     tamsulosin (FLOMAX) 0.4 MG capsule [Pharmacy Med Name: TAMSULOSIN HCL 0.4MG CAPS]  Last Written Prescription Date:  12/21/2018  Last Fill Quantity: 30 capsule,  # refills: 2   Last office visit: 12/17/2018 with prescribing provider:  Gallito Barba MD    Future Office Visit:     30 capsule 2     Sig: TAKE ONE CAPSULE BY MOUTH ONCE DAILY    Alpha Blockers Passed - 3/17/2019  7:46 AM       Passed - Blood pressure under 140/90 in past 12 months    BP Readings from Last 3 Encounters:   12/17/18 122/74   08/20/18 122/70   11/21/17 118/70                Passed - Recent (12 mo) or future (30 days) visit within the authorizing provider's specialty    Patient had office visit in the last 12 months or has a visit in the next 30 days with authorizing provider or within the authorizing provider's specialty.  See \"Patient Info\" tab in inbasket, or \"Choose Columns\" in Meds & Orders section of the refill encounter.             Passed - Patient does not have Tadalafil, Vardenafil, or Sildenafil on their medication list       Passed - Medication is active on med list       Passed - Patient is 18 years of age or older          "

## 2019-03-18 RX ORDER — TAMSULOSIN HYDROCHLORIDE 0.4 MG/1
CAPSULE ORAL
Qty: 90 CAPSULE | Refills: 3 | Status: SHIPPED | OUTPATIENT
Start: 2019-03-18 | End: 2019-10-23

## 2019-04-09 ENCOUNTER — TRANSFERRED RECORDS (OUTPATIENT)
Dept: HEALTH INFORMATION MANAGEMENT | Facility: CLINIC | Age: 64
End: 2019-04-09

## 2019-04-23 ENCOUNTER — TRANSFERRED RECORDS (OUTPATIENT)
Dept: HEALTH INFORMATION MANAGEMENT | Facility: CLINIC | Age: 64
End: 2019-04-23

## 2019-07-09 ENCOUNTER — TRANSFERRED RECORDS (OUTPATIENT)
Dept: HEALTH INFORMATION MANAGEMENT | Facility: CLINIC | Age: 64
End: 2019-07-09

## 2019-08-13 ENCOUNTER — TRANSFERRED RECORDS (OUTPATIENT)
Dept: HEALTH INFORMATION MANAGEMENT | Facility: CLINIC | Age: 64
End: 2019-08-13

## 2019-08-25 DIAGNOSIS — I10 BENIGN ESSENTIAL HYPERTENSION: ICD-10-CM

## 2019-08-25 NOTE — TELEPHONE ENCOUNTER
"Requested Prescriptions   Pending Prescriptions Disp Refills     CARTIA  MG 24 hr capsule [Pharmacy Med Name: CARTIA XT (DILTIAZEM) 120MG CP24]  Last Written Prescription Date:  08/20/2018  Last Fill Quantity: 90 capsule,  # refills: 3   Last Office Visit: 12/17/2018 Gallito Barba MD   Future Office Visit:      90 capsule 3     Sig: TAKE ONE CAPSULE BY MOUTH EVERY DAY       Calcium Channel Blockers Protocol  Failed - 8/25/2019  6:18 AM        Failed - Normal ALT in past 12 months     Recent Labs   Lab Test 08/20/18  0757   ALT 33             Failed - Normal serum creatinine on file in past 12 months     Recent Labs   Lab Test 08/20/18  0757   CR 1.06             Passed - Blood pressure under 140/90 in past 12 months     BP Readings from Last 3 Encounters:   12/17/18 122/74   08/20/18 122/70   11/21/17 118/70                 Passed - Recent (12 mo) or future (30 days) visit within the authorizing provider's specialty     Patient had office visit in the last 12 months or has a visit in the next 30 days with authorizing provider or within the authorizing provider's specialty.  See \"Patient Info\" tab in inbasket, or \"Choose Columns\" in Meds & Orders section of the refill encounter.              Passed - Medication is active on med list        Passed - Patient is age 18 or older          "

## 2019-08-26 RX ORDER — DILTIAZEM HYDROCHLORIDE 120 MG/1
120 CAPSULE, COATED, EXTENDED RELEASE ORAL DAILY
Qty: 90 CAPSULE | Refills: 0 | Status: SHIPPED | OUTPATIENT
Start: 2019-08-26 | End: 2019-10-23

## 2019-09-17 ENCOUNTER — TELEPHONE (OUTPATIENT)
Dept: PEDIATRICS | Facility: CLINIC | Age: 64
End: 2019-09-17

## 2019-09-17 NOTE — TELEPHONE ENCOUNTER
Reason for Call:  Other call back    Detailed comments: The pt was calling and he would like to speak to his care team about his diltiazem ER COATED BEADS . He has become more fatigued since he has started taking it. He is wondering what he should do.     Phone Number Patient can be reached at: Home number on file [phone] (home)    Best Time: Anytime    Can we leave a detailed message on this number? YES    Call taken on 9/17/2019 at 3:14 PM by Nicole Booth

## 2019-09-24 ENCOUNTER — TRANSFERRED RECORDS (OUTPATIENT)
Dept: HEALTH INFORMATION MANAGEMENT | Facility: CLINIC | Age: 64
End: 2019-09-24

## 2019-09-28 ENCOUNTER — HEALTH MAINTENANCE LETTER (OUTPATIENT)
Age: 64
End: 2019-09-28

## 2019-10-21 ENCOUNTER — PRE VISIT (OUTPATIENT)
Dept: PEDIATRICS | Facility: CLINIC | Age: 64
End: 2019-10-21

## 2019-10-21 ENCOUNTER — TELEPHONE (OUTPATIENT)
Dept: PEDIATRICS | Facility: CLINIC | Age: 64
End: 2019-10-21

## 2019-10-21 NOTE — TELEPHONE ENCOUNTER
"Called the pt.    Informed pt that the antibiotic should still be working. Can review symptoms on Wednesday if they still persist.    - Isaías \"Antelmo\" CARLYLE Thurman  [facility]    "

## 2019-10-21 NOTE — TELEPHONE ENCOUNTER
"Pre-Visit Planning     Future Appointments   Date Time Provider Department Center   10/23/2019  3:25 PM Gallito Barba MD [facility]     Appointment Notes for this encounter:   Data Unavailable    Questionnaires Reviewed/Assigned  No additional questionnaires are needed        Patient preferred phone number: [phone]    Spoke to patient via phone. Patient does not have additional questions or concerns.        Visit is preventive. Reviewed purpose of preventive visit with patient.    Health Maintenance Due   Topic Date Due     ANNUAL REVIEW OF HM ORDERS  1955     ADVANCE CARE PLANNING  1955     PHQ-2  01/01/2019     PREVENTIVE CARE VISIT  08/20/2019     INFLUENZA VACCINE (1) 09/01/2019     Patient is due for:  preventive care visit  Appointment scheduled.    [employer]  Patient is active on [employer].    Questionnaire Review   Advised patient to arrive early in order to complete questionnaires.    Call Summary  \"Thank you for your time today.  If anything comes up before your appointment, please feel free to contact us at [phone].\"    "

## 2019-10-21 NOTE — TELEPHONE ENCOUNTER
"Routing to Dr Barba for .    Pt treated for atypical pneumonia with zith on 10/16/19 in the ED. Still experiencing cough and runny nose. Pt is requesting additional antibiotic. Pt has appt with you on Wednesday.    Should the pt wait for assessment on Wed or continue with additional treatment?    - Isaías \"Antelmo\" CARLYLE Thurman  Triage [facility]    "

## 2019-10-21 NOTE — TELEPHONE ENCOUNTER
Please let him know that his antibiotic is still fully working - the course lasts for 10 days.  He does not need a different antibiotic at this time.

## 2019-10-21 NOTE — TELEPHONE ENCOUNTER
Reason for Call:  Other prescription    Detailed comments: pt is calling and was seen in the urgency room and was given Zithromycin for cough for Atypical Pneumonia and took the last pill Sunday. He still has a cough and runny nose a little. Head cold symptoms still. He would like more Abx. He knows he has a appt on Wednesday.    Phone Number Patient can be reached at: Home number on file [phone] (home)    Best Time: any    Can we leave a detailed message on this number? YES    Call taken on 10/21/2019 at 2:09 PM by Keily Dhaliwal

## 2019-10-23 ENCOUNTER — OFFICE VISIT (OUTPATIENT)
Dept: PEDIATRICS | Facility: CLINIC | Age: 64
End: 2019-10-23
Payer: COMMERCIAL

## 2019-10-23 VITALS
HEART RATE: 54 BPM | DIASTOLIC BLOOD PRESSURE: 84 MMHG | BODY MASS INDEX: 29.64 KG/M2 | SYSTOLIC BLOOD PRESSURE: 136 MMHG | RESPIRATION RATE: 16 BRPM | TEMPERATURE: 97.4 F | OXYGEN SATURATION: 96 % | HEIGHT: 61 IN | WEIGHT: 157 LBS

## 2019-10-23 DIAGNOSIS — N40.1 BENIGN PROSTATIC HYPERPLASIA WITH NOCTURIA: ICD-10-CM

## 2019-10-23 DIAGNOSIS — R35.1 BENIGN PROSTATIC HYPERPLASIA WITH NOCTURIA: ICD-10-CM

## 2019-10-23 DIAGNOSIS — I10 BENIGN ESSENTIAL HYPERTENSION: ICD-10-CM

## 2019-10-23 DIAGNOSIS — Z00.00 ROUTINE GENERAL MEDICAL EXAMINATION AT A HEALTH CARE FACILITY: Primary | ICD-10-CM

## 2019-10-23 PROCEDURE — 99396 PREV VISIT EST AGE 40-64: CPT | Performed by: INTERNAL MEDICINE

## 2019-10-23 RX ORDER — TAMSULOSIN HYDROCHLORIDE 0.4 MG/1
CAPSULE ORAL
Qty: 90 CAPSULE | Refills: 3 | Status: SHIPPED | OUTPATIENT
Start: 2019-10-23 | End: 2020-10-13

## 2019-10-23 RX ORDER — DILTIAZEM HYDROCHLORIDE 120 MG/1
120 CAPSULE, COATED, EXTENDED RELEASE ORAL DAILY
Qty: 90 CAPSULE | Refills: 3 | Status: SHIPPED | OUTPATIENT
Start: 2019-10-23 | End: 2020-05-05 | Stop reason: DRUGHIGH

## 2019-10-23 ASSESSMENT — ENCOUNTER SYMPTOMS
HEMATURIA: 0
COUGH: 1
DIARRHEA: 0
HEMATOCHEZIA: 0
DIZZINESS: 0
FEVER: 0
EYE PAIN: 0
ABDOMINAL PAIN: 0
CHILLS: 0
NERVOUS/ANXIOUS: 0
CONSTIPATION: 0

## 2019-10-23 ASSESSMENT — MIFFLIN-ST. JEOR: SCORE: 1369.49

## 2019-10-23 NOTE — PATIENT INSTRUCTIONS
Preventive Health Recommendations    Yearly exam:             See your health care provider every year in order to  o   Review health changes.   o   Discuss preventive care.    o   Review your medicines.     Have a cholesterol test at least every 5 years.     Have a diabetes test (fasting glucose) every 1-2 years.     Have a colonoscopy again in 2022.     Shots: Get a flu shot each year. Get a tetanus shot every 10 years.     Nutrition:    Eat at least 5 servings of fruits and vegetables daily.     Eat whole-grain bread, whole-wheat pasta and brown rice instead of white grains and rice.     Get adequate Calcium and Vitamin D.     Lifestyle    Exercise for at least 150 minutes a week (30 minutes a day, 5 days a week). This will help you control your weight and prevent disease.     Limit alcohol to one drink per day.     No smoking.     Wear sunscreen to prevent skin cancer.     See your dentist every six months for an exam and cleaning.     See your eye doctor every 1 to 2 years.

## 2019-10-23 NOTE — PROGRESS NOTES
SUBJECTIVE:   CC: Mickey Borden is an 63 year old male who presents for preventative health visit.     Healthy Habits:     Getting at least 3 servings of Calcium per day:  Yes    Bi-annual eye exam:  Yes    Dental care twice a year:  Yes    Sleep apnea or symptoms of sleep apnea:  None    Diet:  Low salt, Low fat/cholesterol and Carbohydrate counting    Frequency of exercise:  4-5 days/week    Duration of exercise:  45-60 minutes    Taking medications regularly:  Yes    Medication side effects:  None    PHQ-2 Total Score: 0    Additional concerns today:  No    HTN. No cardiac sx such as CP, palpitations, PND, orthopnea, GARCIA or peripheral edema.  BP Readings from Last 3 Encounters:   10/23/19 136/84   12/17/18 122/74   08/20/18 122/70       BPH. Helped w/ current meds.    Recent URI/lung illness. Treated w/ azithromycin.  CXR was normal.  Sx are gradually improving.     Today's PHQ-2 Score:   PHQ-2 ( 1999 [employer]) 8/20/2018   Q1: Little interest or pleasure in doing things 0   Q2: Feeling down, depressed or hopeless 0   PHQ-2 Score 0   Q1: Little interest or pleasure in doing things Not at all   Q2: Feeling down, depressed or hopeless Not at all   PHQ-2 Score 0       Abuse: Current or Past(Physical, Sexual or Emotional)- No  Do you feel safe in your environment? yes    Social History     Tobacco Use     Smoking status: Never Smoker     Smokeless tobacco: Never Used   Substance Use Topics     Alcohol use: Yes     Comment: moderate     If you drink alcohol do you typically have >3 drinks per day or >7 drinks per week? No    Alcohol Use 8/20/2018   Prescreen: >3 drinks/day or >7 drinks/week? No   Prescreen: >3 drinks/day or >7 drinks/week? -     Last PSA:   PSA   Date Value Ref Range Status   08/20/2018 0.54 0 - 4 ug/L Final     Comment:     Assay Method:  Chemiluminescence using Siemens Vista analyzer       Reviewed orders with patient. Reviewed health maintenance and updated orders accordingly - Yes    Reviewed and  "updated as needed this visit by Provider  Tobacco  Allergies  Meds  Problems  Med Hx  Surg Hx  Fam Hx          Review of Systems   Constitutional: Negative for chills and fever.   HENT: Positive for congestion. Negative for ear pain.    Eyes: Negative for pain.   Respiratory: Positive for cough.    Cardiovascular: Negative for chest pain.   Gastrointestinal: Negative for abdominal pain, constipation, diarrhea and hematochezia.   Genitourinary: Negative for hematuria.   Neurological: Negative for dizziness.   Psychiatric/Behavioral: The patient is not nervous/anxious.        OBJECTIVE:   /84 (BP Location: Right arm, Patient Position: Sitting, Cuff Size: Adult Regular)   Pulse 54   Temp 97.4  F (36.3  C) (Oral)   Resp 16   Ht 1.556 m (5' 1.25\")   Wt 71.2 kg (157 lb)   SpO2 96%   BMI 29.42 kg/m      Physical Exam  GENERAL: healthy, alert and no distress  EYES: Eyes grossly normal to inspection, PERRL and conjunctivae and sclerae normal  HENT: ear canals and TM's normal, nose and mouth without ulcers or lesions  NECK: no adenopathy, no asymmetry, masses, or scars and thyroid normal to palpation  RESP: lungs clear to auscultation - no rales, rhonchi or wheezes  CV: regular rate and rhythm, normal S1 S2, no S3 or S4, no murmur, click or rub, no peripheral edema and peripheral pulses strong  ABDOMEN: soft, nontender, no hepatosplenomegaly, no masses and bowel sounds normal  MS: no gross musculoskeletal defects noted, no edema  SKIN: no suspicious lesions or rashes  NEURO: Normal strength and tone, mentation intact and speech normal  PSYCH: mentation appears normal, affect normal/bright    ASSESSMENT/PLAN:       ICD-10-CM    1. Routine general medical examination at a health care facility Z00.00    2. Benign essential hypertension I10 diltiazem ER COATED BEADS (CARTIA XT) 120 MG 24 hr capsule   3. Benign prostatic hyperplasia with nocturia N40.1 tamsulosin (FLOMAX) 0.4 MG capsule    R35.1  " "      COUNSELING:   Reviewed preventive health counseling, as reflected in patient instructions    Estimated body mass index is 29.42 kg/m  as calculated from the following:    Height as of this encounter: 1.556 m (5' 1.25\").    Weight as of this encounter: 71.2 kg (157 lb).     Weight management plan: Discussed healthy diet and exercise guidelines     reports that he has never smoked. He has never used smokeless tobacco.      Gallito Barba MD  [facility]  "

## 2019-11-06 NOTE — TELEPHONE ENCOUNTER
FUTURE VISIT INFORMATION      FUTURE VISIT INFORMATION:    Date: 12/31/19    Time: 8:30AM    Location: [facility]  REFERRAL INFORMATION:    Referring provider:      Referring providers clinic:      Reason for visit/diagnosis  spasmodic dysphonia / botox     RECORDS REQUESTED FROM:       Clinic name Comments Records Status Imaging Status   [facility]  9/24/19 notes with Dr Fritz  * records dating as far back as 2011 Care Everywhere     [facility] Medicine 12/17/18 notes with Dr Gallito Barba  EPIC    [facility]   1/23/17 notes with Dr Alisha Sky Care Everywhere     [facility] 7/22/14 notes with Dr Catrina POE

## 2019-11-27 ENCOUNTER — MYC MEDICAL ADVICE (OUTPATIENT)
Dept: PEDIATRICS | Facility: CLINIC | Age: 64
End: 2019-11-27

## 2019-11-27 DIAGNOSIS — J38.5 LARYNGEAL SPASM: Primary | ICD-10-CM

## 2019-11-27 NOTE — TELEPHONE ENCOUNTER
Please review the  message from pt & advise. Thanks.    LOV was on 10/23/19. As per the ENT notes from [facility] on 8/13/19, pt has a hx of laryngeal dystonia & laryngeal spasms. Last botox inj per their note was 7/9/19.     Emma DE LA CRUZ, RN  Triage Nurse

## 2019-12-03 ENCOUNTER — TELEPHONE (OUTPATIENT)
Dept: OTOLARYNGOLOGY | Facility: CLINIC | Age: 64
End: 2019-12-03

## 2019-12-03 NOTE — TELEPHONE ENCOUNTER
I called patient per Dr. Fritz's request to follow up if patient is needing botox.  Patient is not a new patient but long standing/returning patient from his other clinic.  I called and was unable to reach, I left a brief voice message encouraging patient to return call.

## 2019-12-31 ENCOUNTER — PRE VISIT (OUTPATIENT)
Dept: OTOLARYNGOLOGY | Facility: CLINIC | Age: 64
End: 2019-12-31

## 2020-01-07 ENCOUNTER — OFFICE VISIT (OUTPATIENT)
Dept: OTOLARYNGOLOGY | Facility: CLINIC | Age: 65
End: 2020-01-07
Attending: INTERNAL MEDICINE
Payer: COMMERCIAL

## 2020-01-07 VITALS
HEART RATE: 69 BPM | WEIGHT: 158 LBS | BODY MASS INDEX: 29.61 KG/M2 | SYSTOLIC BLOOD PRESSURE: 143 MMHG | DIASTOLIC BLOOD PRESSURE: 108 MMHG

## 2020-01-07 DIAGNOSIS — J38.5 LARYNGEAL SPASM: Primary | ICD-10-CM

## 2020-01-07 DIAGNOSIS — J38.3 OTHER DISEASES OF VOCAL CORDS: ICD-10-CM

## 2020-01-07 ASSESSMENT — PAIN SCALES - GENERAL: PAINLEVEL: NO PAIN (0)

## 2020-01-07 NOTE — PROGRESS NOTES
Mickey Borden is a 64 year old male with a history of adductor laryngeal dystonia and laryngeal spasm.  he was last injected on 11/12/2019. he had a good response to the last injection. The last dose given was 00.13 units into each thyroarytenoid muscle.  After the injection  he had a breathy dysphonia for 0 weeks.There was no Dysphagia or Dyspnea.  The response lasted for 1.5 months.   Our plan is to give 0.13 units of Botulinum A toxin into  each thyroarytenoid muscle today. This was diluted with the injection at a concentration of 6.25 units of botulinum A toxin per cubic centimeter saline solution. We then injected 0.02 cc of volume to each side.         PROCEDURE: After obtaining consent, the patient was placed in the supine position. Ground and reference electrodes were applied. The anterior neck was cleaned with an alcohol swab.  Using a 27-gauge, unipolar electromyography needle, the larynx was entered through the cricothyroid space.  0.13 units of botulinum A toxin was injected into each thyroarytenoid muscle.  There was a Strong EMG response to phonation on the left side and a Strong EMG response to phonation on the right side.  A second pass was required on the right side.  The total amount of botulinum A toxin delivered today was 0.26 units. An additional 5 units of botulinum A toxin was necessarily wasted in preparation for the injection. he tolerated the procedure well and left the clinic after a short observation period.         The EMG was necessary specifically in this case to identify areas of muscle overactivity as well as to access the thyroarytenoid muscle which is beneath the thyroid cartilage and is not otherwise directly accessible without EMG guidance.    PLAN: I will have him  follow up on a PRN basis.

## 2020-01-07 NOTE — NURSING NOTE
Invasive Procedure Safety Checklist  Procedure:  botox    Responsible person(s):  Complete sections as appropriate and electronically sign and date below.    Staff/Provider  Consent documentation on chart:  YES  H&P is not applicable (when straight local anesthesia is used).    Procedure Team  Completed by comparing informed consent documentation, information on the patient record and/or the marked surgical site, and discussion with the patient/guardian.     Verified:  (Select all that apply)  Patient identification (two indicators)  Procedure to be performed  Procedure site and /or laterality and/or level  Consent  Procedure site:  Site can not be marked due to location.  Provider Kan - Site/Laterality/Level:  No Level or Structure  Staff/Provider:  No images    Procedure Team:  *Pause for the Cause* verbal and active participation of team members- verify:  Patient name:  YES  Procedure to be performed:  YES  Site, laterality and level, noting patient position:  YES    Above steps completed as applicable (Electronic Signature, Title, Date):    CARLYLE De La Cruz    Note:  Any incidents of wrong patient, wrong procedure, or wrong site are reported using the Occurrence Process already in place.  The occurrence form is required to be completed immediately with this type of event.

## 2020-01-07 NOTE — LETTER
1/7/2020       RE: Mickey Borden  [address] [place] [address]     Dear Colleague,    Thank you for referring your patient, Mickey Borden, to the [facility] EAR NOSE AND THROAT at [facility]. Please see a copy of my visit note below.      Mickey Borden is a 64 year old male with a history of adductor laryngeal dystonia and laryngeal spasm.  he was last injected on 11/12/2019. he had a good response to the last injection. The last dose given was 00.13 units into each thyroarytenoid muscle.  After the injection  he had a breathy dysphonia for 0 weeks.There was no Dysphagia or Dyspnea.  The response lasted for 1.5 months.   Our plan is to give 0.13 units of Botulinum A toxin into  each thyroarytenoid muscle today. This was diluted with the injection at a concentration of 6.25 units of botulinum A toxin per cubic centimeter saline solution. We then injected 0.02 cc of volume to each side.         PROCEDURE: After obtaining consent, the patient was placed in the supine position. Ground and reference electrodes were applied. The anterior neck was cleaned with an alcohol swab.  Using a 27-gauge, unipolar electromyography needle, the larynx was entered through the cricothyroid space.  0.13 units of botulinum A toxin was injected into each thyroarytenoid muscle.  There was a Strong EMG response to phonation on the left side and a Strong EMG response to phonation on the right side.  A second pass was required on the right side.  The total amount of botulinum A toxin delivered today was 0.26 units. An additional 5 units of botulinum A toxin was necessarily wasted in preparation for the injection. he tolerated the procedure well and left the clinic after a short observation period.         The EMG was necessary specifically in this case to identify areas of muscle overactivity as well as to access the thyroarytenoid muscle which is beneath the thyroid cartilage and is not  otherwise directly accessible without EMG guidance.    PLAN: I will have him  follow up on a PRN basis.          Again, thank you for allowing me to participate in the care of your patient.      Sincerely,    Tae Fritz MD

## 2020-02-04 ENCOUNTER — MYC MEDICAL ADVICE (OUTPATIENT)
Dept: PEDIATRICS | Facility: CLINIC | Age: 65
End: 2020-02-04

## 2020-02-04 ENCOUNTER — TELEPHONE (OUTPATIENT)
Dept: PEDIATRICS | Facility: CLINIC | Age: 65
End: 2020-02-04

## 2020-04-20 ENCOUNTER — TELEPHONE (OUTPATIENT)
Dept: OTOLARYNGOLOGY | Facility: CLINIC | Age: 65
End: 2020-04-20

## 2020-04-20 NOTE — TELEPHONE ENCOUNTER
Left message with patient regarding botox injection scheduled with Dr. Fritz on 4/28/20. Will need to postpone this injection. Due to the COVID-19 pandemic, these injections are considered high risk due to risk of coughing and droplet exposure. Assured patient that we would get him rescheduled as soon as the clinic is able to offer these injections safely. This is dependent on the safety surrounding COVID-19 exposure,  availability of COVID-19 testing and PPE. Our scheduling department will add him on the waiting list to contact him for an injection once available.    Contact information left on message for any further questions or concerns.

## 2020-05-05 ENCOUNTER — VIRTUAL VISIT (OUTPATIENT)
Dept: PEDIATRICS | Facility: CLINIC | Age: 65
End: 2020-05-05
Payer: COMMERCIAL

## 2020-05-05 DIAGNOSIS — E78.5 HYPERLIPIDEMIA LDL GOAL <130: ICD-10-CM

## 2020-05-05 DIAGNOSIS — Z12.5 SCREENING FOR PROSTATE CANCER: ICD-10-CM

## 2020-05-05 DIAGNOSIS — I10 BENIGN ESSENTIAL HYPERTENSION: Primary | ICD-10-CM

## 2020-05-05 DIAGNOSIS — J38.5 LARYNGEAL SPASM: ICD-10-CM

## 2020-05-05 PROCEDURE — 99214 OFFICE O/P EST MOD 30 MIN: CPT | Mod: TEL | Performed by: INTERNAL MEDICINE

## 2020-05-05 RX ORDER — DILTIAZEM HYDROCHLORIDE 180 MG/1
180 CAPSULE, EXTENDED RELEASE ORAL DAILY
Qty: 90 CAPSULE | Refills: 3 | Status: SHIPPED | OUTPATIENT
Start: 2020-05-05 | End: 2021-04-05

## 2020-05-05 NOTE — PROGRESS NOTES
"Mickey Borden is a 64 year old male who is being evaluated via a billable telephone visit.      The patient has been notified of following:     \"This telephone visit will be conducted via a call between you and your physician/provider. We have found that certain health care needs can be provided without the need for a physical exam.  This service lets us provide the care you need with a short phone conversation.  If a prescription is necessary we can send it directly to your pharmacy.  If lab work is needed we can place an order for that and you can then stop by our lab to have the test done at a later time.    Telephone visits are billed at different rates depending on your insurance coverage. During this emergency period, for some insurers they may be billed the same as an in-person visit.  Please reach out to your insurance provider with any questions.    If during the course of the call the physician/provider feels a telephone visit is not appropriate, you will not be charged for this service.\"    Patient has given verbal consent for Telephone visit?  Yes    What phone number would you like to be contacted at? [phone]    How would you like to obtain your AVS? David    Subjective     Mickey Borden is a 64 year old male who presents to clinic today for the following health issues:    HPI    Hypertension.   Is taking meds as directed.  Had dental appointments earlier this year, 130's systolic.    Outside ED visit 3/5/20 was 151/105.    BP Readings from Last 3 Encounters:   01/07/20 (!) 143/108   10/23/19 136/84   12/17/18 122/74     Has a cuff at home. Checked BP yesterday mid day 179/101.    Woke this am early, 157/97.      Chronic laryngeal spasm  Typically has botox injections every 1 1/2 months.   Last injection was 1/7/20, other scheduled visits have been cancelled d/t COVID.    Has been diligent about lifestyle changes.  Weight is down to 150#.  Exercising daily - 5 days a week spends 30 minutes " on the elliptical. Walks 1-2 times per day for 30 minutes in addition.  Does drink wine, 1-2 glasses per day. Cigars a few per week.    Hx of hyperlipidemia. Had been treated with atorvastatin.  No longer taking statins due to decreased exercise stamina. Last   Lab Results   Component Value Date     08/20/2018         Objective   Reported vitals:  There were no vitals taken for this visit.   PSYCH: Alert and oriented times 3; coherent speech, normal   rate and volume, able to articulate logical thoughts, able   to abstract reason, no tangential thoughts, no hallucinations   or delusions  His affect is normal  RESP: No cough, no audible wheezing, able to talk in full sentences  Remainder of exam unable to be completed due to telephone visits            Assessment/Plan:    ICD-10-CM    1. Benign essential hypertension  I10 diltiazem ER (DILT-XR) 180 MG 24 hr capsule     Lipid panel reflex to direct LDL Fasting     **Comprehensive metabolic panel FUTURE anytime   2. Laryngeal spasm  J38.5    3. Hyperlipidemia LDL goal <130  E78.5 Lipid panel reflex to direct LDL Fasting   4. Screening for prostate cancer  Z12.5 **Prostate spec antigen screen FUTURE anytime     Patient Instructions   Increase diltiazem to 180 mg per day    If your blood pressure is not in the optimal range in 2-3 weeks, contact me to discuss another dose increase    Schedule a lab visit in the near future   Fast for the labs - at least 8 hours without calories      Phone call duration:  27 minutes    Gallito Barba MD

## 2020-05-05 NOTE — PATIENT INSTRUCTIONS
Increase diltiazem to 180 mg per day    If your blood pressure is not in the optimal range in 2-3 weeks, contact me to discuss another dose increase    Schedule a lab visit in the near future   Fast for the labs - at least 8 hours without calories

## 2020-05-12 ENCOUNTER — NURSE TRIAGE (OUTPATIENT)
Dept: NURSING | Facility: CLINIC | Age: 65
End: 2020-05-12

## 2020-05-12 ENCOUNTER — TELEPHONE (OUTPATIENT)
Dept: OTOLARYNGOLOGY | Facility: CLINIC | Age: 65
End: 2020-05-12

## 2020-05-12 NOTE — TELEPHONE ENCOUNTER
Left vm message for patient in regards to botox and the possibility of being scheduled in the near future, however need to know what pt does for work and if he needs/or uses his voice for work. Call back number provided on [employer].

## 2020-05-13 NOTE — TELEPHONE ENCOUNTER
"Pt of Dr Mendoza.      Pt stated that a week ago Pt had a phone visit with Dr. Mendoza.     Pt stated that he was on cardia, 120mg for several years.  When he talked to Dr. Viramontes he mentioned his BP was high.  Cardia was increased to 180mg.      Pt also on asa 81.  Pt asked if he could stop asa.  Pt was told ok to stop.    Pt reports that today about an hour ago Pt started getting a headache, minor pain in Left side of chest.  Agitation in hands.  Pt stated that it's not a pain, but more of a heaviness or agitation.  Pt also reports that he has headache and fullness, slight dizzy.    Writer reviewed importance of being evaluated by ED provider.  Pt stated that he would go to [facility].  Pt has a person to drive him.      Rei Hernandez, RN  Care Coordinator   [facility]       Reason for Disposition    Chest pain lasts > 5 minutes (Exceptions: chest pain occurring > 3 days ago and now asymptomatic; same as previously diagnosed heartburn and has accompanying sour taste in mouth)    Additional Information    Negative: Severe difficulty breathing (e.g., struggling for each breath, speaks in single words)    Negative: Difficult to awaken or acting confused (e.g., disoriented, slurred speech)    Negative: Shock suspected (e.g., cold/pale/clammy skin, too weak to stand, low BP, rapid pulse)    Negative: [1] Chest pain lasts > 5 minutes AND [2] history of heart disease  (i.e., heart attack, bypass surgery, angina, angioplasty, CHF; not just a heart murmur)    Negative: [1] Intermittent  chest pain or \"angina\" AND [2] increasing in severity or frequency  (Exception: pains lasting a few seconds)    Negative: Dizziness or lightheadedness    Negative: Pain also present in shoulder(s) or arm(s) or jaw  (Exception: pain is clearly made worse by movement)    Protocols used: CHEST PAIN-A-AH      "

## 2020-07-09 ENCOUNTER — TELEPHONE (OUTPATIENT)
Dept: PEDIATRICS | Facility: CLINIC | Age: 65
End: 2020-07-09

## 2020-07-09 DIAGNOSIS — M79.674 PAIN OF TOE OF RIGHT FOOT: Primary | ICD-10-CM

## 2020-07-09 NOTE — TELEPHONE ENCOUNTER
Order/Referral Request:  Who is requesting: Patient  Orders being requested: Referral for podiatry  Reason service is needed/diagnosis: Sores on foot  When are orders needed by: 07/23  Has this been discussed with Provider: No  Does patient have a preference on a Group/Provider/Facility? [facility]  Does patient have an appointment scheduled: Yes:   Where to send Orders: Fax  Okay to leave detailed message?  [facility] at Home number on file [phone] (home)    Routing

## 2020-07-10 NOTE — TELEPHONE ENCOUNTER
PCP:    Has a hx of bone fusion in his big toes.   Surgery was almost 10 years ago. Having some pain in this area.     Right big toe, for the last 3-4 years there has been a corn like growth present.   He has used OTC corn removers, without much help.   He does a lot of walking, so he has quite a few blisters that develop on some of his other toes as well.   These blisters are causing him pain.     He is requesting a podiatry referral. Would like to see Dr. Arriola here at .     Sugar partially pended a referral for you to review. He would like to be seen sooner than later because his feet are starting to really bother him especially with walking.     Ivet Kate, RN   [facility] -- Triage Nurse

## 2020-07-21 ENCOUNTER — OFFICE VISIT (OUTPATIENT)
Dept: OTOLARYNGOLOGY | Facility: CLINIC | Age: 65
End: 2020-07-21
Payer: COMMERCIAL

## 2020-07-21 VITALS — TEMPERATURE: 97.7 F | BODY MASS INDEX: 28.7 KG/M2 | HEIGHT: 61 IN | WEIGHT: 152 LBS

## 2020-07-21 DIAGNOSIS — J38.3 OTHER DISEASES OF VOCAL CORDS: ICD-10-CM

## 2020-07-21 DIAGNOSIS — J38.5 LARYNGEAL SPASM: Primary | ICD-10-CM

## 2020-07-21 ASSESSMENT — MIFFLIN-ST. JEOR: SCORE: 1337.85

## 2020-07-21 ASSESSMENT — PAIN SCALES - GENERAL: PAINLEVEL: NO PAIN (0)

## 2020-07-21 NOTE — PROGRESS NOTES
Mickey Borden is a 64 year old male with a history of adductor laryngeal dystonia and laryngeal spasm.  he was last injected at the [facility] on 1/7/2020.  He recently had a vocal fold/Botox injection at [facility] with Dr. Wolfe.  A prior injection had gone very well at [facility].  During the last injection there was some difficulties on the left side and he had a reduced effect on his most recent injection.  He already had an injection scheduled here so he decided to keep this appointment.  He had a good response to the last injection at the [facility]. The last dose given was 0.13 units into each thyroarytenoid muscle.  After the injection  he had a breathy dysphonia for 0 weeks.There was no Dysphagia or Dyspnea.  The response lasted for 1.5 months.   Our plan is to give 0.13 units of Botulinum A toxin into  the left thyroarytenoid muscle today. This was diluted with the injection at a concentration of 6.25 units of botulinum A toxin per cubic centimeter saline solution. We then injected 0.02 cc of volume to each side.     PROCEDURE: After obtaining consent, the patient was placed in the supine position. Ground and reference electrodes were applied. The anterior neck was cleaned with an alcohol swab.  Using a 27-gauge, unipolar electromyography needle, the larynx was entered through the cricothyroid space.  0.13 units of botulinum A toxin was injected into the left thyroarytenoid muscle.  There was a Strong EMG response to phonation on the left side.  The total amount of botulinum A toxin delivered today was 0.13 units. An additional 5 units of botulinum A toxin was necessarily wasted in preparation for the injection. he tolerated the procedure well and left the clinic after a short observation period.         The EMG was necessary specifically in this case to identify areas of muscle overactivity as well as to access the thyroarytenoid muscle which is beneath the thyroid cartilage and is not  otherwise directly accessible without EMG guidance.    PLAN: I will have him  follow up on a PRN basis. It is anticipated that repeat injections will be required over the long term.

## 2020-07-21 NOTE — LETTER
7/21/2020       RE: Mickey Borden  [address] [place] [address]     Dear Colleague,    Thank you for referring your patient, Mickey Borden, to the [facility] EAR NOSE AND THROAT at [facility]. Please see a copy of my visit note below.    Mickey Borden is a 64 year old male with a history of adductor laryngeal dystonia and laryngeal spasm.  he was last injected at the [facility] on 1/7/2020.  He recently had a vocal fold/Botox injection at [facility] with Dr. Wolfe.  A prior injection had gone very well at [facility].  During the last injection there was some difficulties on the left side and he had a reduced effect on his most recent injection.  He already had an injection scheduled here so he decided to keep this appointment.  He had a good response to the last injection at the [facility]. The last dose given was 0.13 units into each thyroarytenoid muscle.  After the injection  he had a breathy dysphonia for 0 weeks.There was no Dysphagia or Dyspnea.  The response lasted for 1.5 months.   Our plan is to give 0.13 units of Botulinum A toxin into  the left thyroarytenoid muscle today. This was diluted with the injection at a concentration of 6.25 units of botulinum A toxin per cubic centimeter saline solution. We then injected 0.02 cc of volume to each side.     PROCEDURE: After obtaining consent, the patient was placed in the supine position. Ground and reference electrodes were applied. The anterior neck was cleaned with an alcohol swab.  Using a 27-gauge, unipolar electromyography needle, the larynx was entered through the cricothyroid space.  0.13 units of botulinum A toxin was injected into the left thyroarytenoid muscle.  There was a Strong EMG response to phonation on the left side.  The total amount of botulinum A toxin delivered today was 0.13 units. An additional 5 units of botulinum A toxin was necessarily wasted in preparation for the  injection. he tolerated the procedure well and left the clinic after a short observation period.         The EMG was necessary specifically in this case to identify areas of muscle overactivity as well as to access the thyroarytenoid muscle which is beneath the thyroid cartilage and is not otherwise directly accessible without EMG guidance.    PLAN: I will have him  follow up on a PRN basis. It is anticipated that repeat injections will be required over the long term.          Again, thank you for allowing me to participate in the care of your patient.      Sincerely,    Tae Fritz MD

## 2020-07-21 NOTE — PATIENT INSTRUCTIONS
1.  You were seen in the ENT Clinic today by . If you have any questions or concerns after your appointment, please call [phone]. Press option #1 for scheduling related needs. Press option #3 for Nurse advice.    2.  Plan is to return to clinic as needed for repeat botox treatment      My Adorno LPN  [facility] - Otolaryngology

## 2020-07-21 NOTE — NURSING NOTE
Invasive Procedure Safety Checklist  Procedure:  botox    Responsible person(s):  Complete sections as appropriate and electronically sign and date below.    Staff/Provider  Consent documentation on chart:  YES  H&P is not applicable (when straight local anesthesia is used).    Procedure Team  Completed by comparing informed consent documentation, information on the patient record and/or the marked surgical site, and discussion with the patient/guardian.     Verified:  (Select all that apply)  Patient identification (two indicators)  Procedure to be performed  Procedure site and /or laterality and/or level  Consent  Procedure site:  Site can not be marked due to location.  Provider Kan - Site/Laterality/Level:  No Level or Structure  Staff/Provider:  No images    Procedure Team:  *Pause for the Cause* verbal and active participation of team members- verify:  Patient name:  YES  Procedure to be performed:  YES  Site, laterality and level, noting patient position:  YES    Above steps completed as applicable (Electronic Signature, Title, Date):    My Adorno LPN      Note:  Any incidents of wrong patient, wrong procedure, or wrong site are reported using the Occurrence Process already in place.  The occurrence form is required to be completed immediately with this type of event.

## 2020-07-23 ENCOUNTER — OFFICE VISIT (OUTPATIENT)
Dept: PODIATRY | Facility: CLINIC | Age: 65
End: 2020-07-23
Payer: COMMERCIAL

## 2020-07-23 VITALS
BODY MASS INDEX: 28.7 KG/M2 | HEIGHT: 61 IN | DIASTOLIC BLOOD PRESSURE: 74 MMHG | WEIGHT: 152 LBS | SYSTOLIC BLOOD PRESSURE: 130 MMHG

## 2020-07-23 DIAGNOSIS — M79.674 PAIN OF TOE OF RIGHT FOOT: ICD-10-CM

## 2020-07-23 DIAGNOSIS — L84 CORN OR CALLUS: Primary | ICD-10-CM

## 2020-07-23 PROCEDURE — 99203 OFFICE O/P NEW LOW 30 MIN: CPT | Performed by: PODIATRIST

## 2020-07-23 ASSESSMENT — MIFFLIN-ST. JEOR: SCORE: 1337.85

## 2020-07-23 NOTE — PROGRESS NOTES
PATIENT HISTORY:  Dr. Barba requested I see this patient for their foot issue.  Mickey Borden is a 64 year old male who presents to clinic for right toe pain.  Both of his big toes fused years ago due to arthritis and notes that he started developed calluses in between the second and big toes since then.  They can get pretty sore with walking and sometimes blister.  Pain can be 4 out of 10 at its worst.  He is tried wider shoes and foam toe spacers but they do not seem to stay in.  Denies specific injury.  Denies fever, nausea, vomiting.  Wondering what can be done to help stop or prevent the calluses from occurring.    Review of Systems:  Patient denies fever, chills, rash, wound, stiffness, limping, numbness, weakness, heart burn, blood in stool, chest pain with activity, calf pain when walking, shortness of breath with activity, chronic cough, easy bleeding/bruising, swelling of ankles, excessive thirst, fatigue, depression, anxiety.       PAST MEDICAL HISTORY: No past medical history on file.     PAST SURGICAL HISTORY: No past surgical history on file.     MEDICATIONS:   Current Outpatient Medications:      botulinum toxin type A (BOTOX) 100 UNITS injection, Inject 0.25 units, Disp: 0.25 each, Rfl:      diltiazem ER (DILT-XR) 180 MG 24 hr capsule, Take 1 capsule (180 mg) by mouth daily, Disp: 90 capsule, Rfl: 3     diltiazem ER COATED BEADS (CARTIA XT) 120 MG 24 hr capsule, Take 1 capsule (120 mg) by mouth daily, Disp: 90 capsule, Rfl: 3     Multiple Vitamin (DAILY VITAMIN PO), Take  by mouth daily., Disp: , Rfl:      Omega-3 Fatty Acids (FISH OIL) 1200 MG CAPS, Take 1 tablet by mouth daily, Disp: , Rfl:      Saw Palmetto, Serenoa repens, (SAW PALMETTO CONCENTRATE OR), , Disp: , Rfl:      tamsulosin (FLOMAX) 0.4 MG capsule, TAKE ONE CAPSULE BY MOUTH ONCE DAILY, Disp: 90 capsule, Rfl: 3     ALLERGIES:  No Known Allergies     SOCIAL HISTORY:   Social History     Socioeconomic History     Marital status:   "    Spouse name: Not on file     Number of children: Not on file     Years of education: Not on file     Highest education level: Not on file   Occupational History     Not on file   Social Needs     Financial resource strain: Not on file     Food insecurity     Worry: Not on file     Inability: Not on file     Transportation needs     Medical: Not on file     Non-medical: Not on file   Tobacco Use     Smoking status: Never Smoker     Smokeless tobacco: Never Used   Substance and Sexual Activity     Alcohol use: Yes     Comment: moderate     Drug use: No     Sexual activity: Not on file   Lifestyle     Physical activity     Days per week: Not on file     Minutes per session: Not on file     Stress: Not on file   Relationships     Social connections     Talks on phone: Not on file     Gets together: Not on file     Attends Cheondoism service: Not on file     Active member of club or organization: Not on file     Attends meetings of clubs or organizations: Not on file     Relationship status: Not on file     Intimate partner violence     Fear of current or ex partner: Not on file     Emotionally abused: Not on file     Physically abused: Not on file     Forced sexual activity: Not on file   Other Topics Concern     Parent/sibling w/ CABG, MI or angioplasty before 65F 55M? Not Asked   Social History Narrative     Not on file        FAMILY HISTORY:   Family History   Problem Relation Age of Onset     Hypertension Mother      Melanoma Father      Coronary Artery Disease Sister         EXAM:Vitals:  /74   Ht 1.549 m (5' 1\")   Wt 68.9 kg (152 lb)   BMI 28.72 kg/m      General appearance: Patient is alert and fully cooperative with history & exam.  No sign of distress is noted during the visit.     Psychiatric: Affect is pleasant & appropriate.  Patient appears motivated to improve health.     Respiratory: Breathing is regular & unlabored while sitting.     HEENT: Hearing is intact to spoken word.  Speech is clear.  " No gross evidence of visual impairment that would impact ambulation.     Dermatologic: Localized hyperkeratotic lesions to the lateral great IPJ's and the right second medial PIPJ.  No open lesions or signs of infection noted.     Vascular: DP & PT pulses are intact & regular bilaterally.  No significant edema or varicosities noted.  CFT and skin temperature is normal to both lower extremities.     Neurologic: Lower extremity sensation is intact to light touch.  No evidence of weakness or contracture in the lower extremities.  No evidence of neuropathy.     Musculoskeletal: Patient is ambulatory without assistive device or brace.  Both great toes are stiff and do not bend at the metatarsal phalangeal joints due to previous surgery.     ASSESSMENT:    Pain of toe of right foot  Corn or callus     PLAN:  Reviewed patient's chart in epic. Discussed causes of keratomas.  They are due to areas of increase friction.  Hammertoes can create these as they put more pressure to the metatarsal head.  Discussed treatments such as using foot file, pumice stone, metatarsal pads, orthotics, and not walking barefoot.     We discussed the cost structure of callus care if they were to come back and have it treated in the clinic if insurance does not cover it and explained that they would be billed. They were also provided information on places to get the callus treatment.    Recommend silicone toe spacers in between the toes or toe covers.  Talked about using a pumice stone or emery board twice a week to keep the calluses down.  Discussed that this is likely friction from the fusion of his great toes and there is not much surgically that can be done for this.  He will call with further questions or concerns.       Amanda Arriola DPM, Podiatry/Foot and Ankle Surgery    Weight management plan: Patient was referred to their PCP to discuss a diet and exercise plan.    Recommended to Mickey Borden to follow up with Primary Care  provider regarding elevated blood pressure.

## 2020-07-23 NOTE — LETTER
7/23/2020         RE: Mickey Borden  [address] [place] [address]        Dear Colleague,    Thank you for referring your patient, Mickey Borden, to the [facility] PODI[facility]RY. Please see a copy of my visit note below.    PATIENT HISTORY:  Dr. Barba requested I see this patient for their foot issue.  Mickey Borden is a 64 year old male who presents to clinic for right toe pain.  Both of his big toes fused years ago due to arthritis and notes that he started developed calluses in between the second and big toes since then.  They can get pretty sore with walking and sometimes blister.  Pain can be 4 out of 10 at its worst.  He is tried wider shoes and foam toe spacers but they do not seem to stay in.  Denies specific injury.  Denies fever, nausea, vomiting.  Wondering what can be done to help stop or prevent the calluses from occurring.    Review of Systems:  Patient denies fever, chills, rash, wound, stiffness, limping, numbness, weakness, heart burn, blood in stool, chest pain with activity, calf pain when walking, shortness of breath with activity, chronic cough, easy bleeding/bruising, swelling of ankles, excessive thirst, fatigue, depression, anxiety.       PAST MEDICAL HISTORY: No past medical history on file.     PAST SURGICAL HISTORY: No past surgical history on file.     MEDICATIONS:   Current Outpatient Medications:      botulinum toxin type A (BOTOX) 100 UNITS injection, Inject 0.25 units, Disp: 0.25 each, Rfl:      diltiazem ER (DILT-XR) 180 MG 24 hr capsule, Take 1 capsule (180 mg) by mouth daily, Disp: 90 capsule, Rfl: 3     diltiazem ER COATED BEADS (CARTIA XT) 120 MG 24 hr capsule, Take 1 capsule (120 mg) by mouth daily, Disp: 90 capsule, Rfl: 3     Multiple Vitamin (DAILY VITAMIN PO), Take  by mouth daily., Disp: , Rfl:      Omega-3 Fatty Acids (FISH OIL) 1200 MG CAPS, Take 1 tablet by mouth daily, Disp: , Rfl:      Saw Palmetto, Serenoa repens, (SAW PALMETTO CONCENTRATE OR),  ", Disp: , Rfl:      tamsulosin (FLOMAX) 0.4 MG capsule, TAKE ONE CAPSULE BY MOUTH ONCE DAILY, Disp: 90 capsule, Rfl: 3     ALLERGIES:  No Known Allergies     SOCIAL HISTORY:   Social History     Socioeconomic History     Marital status:      Spouse name: Not on file     Number of children: Not on file     Years of education: Not on file     Highest education level: Not on file   Occupational History     Not on file   Social Needs     Financial resource strain: Not on file     Food insecurity     Worry: Not on file     Inability: Not on file     Transportation needs     Medical: Not on file     Non-medical: Not on file   Tobacco Use     Smoking status: Never Smoker     Smokeless tobacco: Never Used   Substance and Sexual Activity     Alcohol use: Yes     Comment: moderate     Drug use: No     Sexual activity: Not on file   Lifestyle     Physical activity     Days per week: Not on file     Minutes per session: Not on file     Stress: Not on file   Relationships     Social connections     Talks on phone: Not on file     Gets together: Not on file     Attends Quaker service: Not on file     Active member of club or organization: Not on file     Attends meetings of clubs or organizations: Not on file     Relationship status: Not on file     Intimate partner violence     Fear of current or ex partner: Not on file     Emotionally abused: Not on file     Physically abused: Not on file     Forced sexual activity: Not on file   Other Topics Concern     Parent/sibling w/ CABG, MI or angioplasty before 65F 55M? Not Asked   Social History Narrative     Not on file        FAMILY HISTORY:   Family History   Problem Relation Age of Onset     Hypertension Mother      Melanoma Father      Coronary Artery Disease Sister         EXAM:Vitals:  /74   Ht 1.549 m (5' 1\")   Wt 68.9 kg (152 lb)   BMI 28.72 kg/m      General appearance: Patient is alert and fully cooperative with history & exam.  No sign of distress is " noted during the visit.     Psychiatric: Affect is pleasant & appropriate.  Patient appears motivated to improve health.     Respiratory: Breathing is regular & unlabored while sitting.     HEENT: Hearing is intact to spoken word.  Speech is clear.  No gross evidence of visual impairment that would impact ambulation.     Dermatologic: Localized hyperkeratotic lesions to the lateral great IPJ's and the right second medial PIPJ.  No open lesions or signs of infection noted.     Vascular: DP & PT pulses are intact & regular bilaterally.  No significant edema or varicosities noted.  CFT and skin temperature is normal to both lower extremities.     Neurologic: Lower extremity sensation is intact to light touch.  No evidence of weakness or contracture in the lower extremities.  No evidence of neuropathy.     Musculoskeletal: Patient is ambulatory without assistive device or brace.  Both great toes are stiff and do not bend at the metatarsal phalangeal joints due to previous surgery.     ASSESSMENT:    Pain of toe of right foot  Corn or callus     PLAN:  Reviewed patient's chart in epic. Discussed causes of keratomas.  They are due to areas of increase friction.  Hammertoes can create these as they put more pressure to the metatarsal head.  Discussed treatments such as using foot file, pumice stone, metatarsal pads, orthotics, and not walking barefoot.     We discussed the cost structure of callus care if they were to come back and have it treated in the clinic if insurance does not cover it and explained that they would be billed. They were also provided information on places to get the callus treatment.    Recommend silicone toe spacers in between the toes or toe covers.  Talked about using a pumice stone or emery board twice a week to keep the calluses down.  Discussed that this is likely friction from the fusion of his great toes and there is not much surgically that can be done for this.  He will call with further  questions or concerns.       Amanda Arriola DPM, Podiatry/Foot and Ankle Surgery    Weight management plan: Patient was referred to their PCP to discuss a diet and exercise plan.    Recommended to Mickey Borden to follow up with Primary Care provider regarding elevated blood pressure.        Again, thank you for allowing me to participate in the care of your patient.        Sincerely,        Amanda Arriola DPM, Podiatry/Foot and Ankle Surgery

## 2020-07-23 NOTE — PATIENT INSTRUCTIONS
Thank you for choosing [facility] Podiatry / Foot & Ankle Surgery!    DR. SHAFFER'S CLINIC:  [facility]   [address]   [place] [address]   [phone] / [phone]      SCHEDULE SURGERY: [phone]   BILLING QUESTIONS: [phone]   AFTER HOURS: [phone]   APPOINTMENTS: [phone]   CONSUMER PRICE LINE: [phone]      Follow up: as needed  Diagnosis: calluses      Please read through the following handouts and if you have any questions, please feel free to call us or send a [employer] message!  CALLUS / CORNS / IPKs  When there is excessive friction or pressure on the skin, the body responds by making the skin thicker to protect the deeper structures from becoming exposed. While this works well to protect the deeper structures, the thickened skin can increase pressure and pain.    CALLUS: Flat, diffuse thickening are simple calluses and they are usually caused by friction. Often these are the result of rubbing on a shoe or going barefoot.    CORNS: Calluses with a central core between the toes are called corns. These result from prominent joints on adjacent toes rubbing together. Theses are a symptom of bone malalignment and will always recur unless the underlying bones are addressed surgically.    IPKs: Calluses with a central core on the ball of the foot are usually IPKs (intractable plantar keratosis). These are caused by excessive pressure from the metatarsals, the bones that make up the ball of the foot. Often one of these bones is too long or too prominent.  Again, these will always recur unless the underlying bone issue is addressed. There is no cure for these. They will either go away by themselves, recur, or more could develop.    ROUTINE MAINTENANCE  1. File them down with a pumice stone or callus file a couple times a week.   2. An electric callus removing device. Amope Pedi Perfect Electronic Pedicure Foot File and Callus Remover can be a good  option.   3. Lotion can be applied to soften the callus. A urea based cream such as Kersal or Vanicream or thicker cream with shea butter are good options.  4. Toe spacers or toe covers can be used for corns, gel pads can be used for other lesions on the bottom of the foot.   If there is a surgical pathology noted, such as a prominent bone, often this needs to be addressed surgically to minimize recurrence. However, sometimes the lesion simply migrates to another spot after surgery, so it is not a guaranteed cure.     There was also discussion of the cost structure of callus care if they were to come back and have it treated in the clinic. Explained that if insurance does not cover it, they would be billed. This charge could range from $100 - $160.  They were also provided information on places to get the callus treatment.    [email or web address] or call 9-340-[employer]  REHAN Arevalo to follow up with Primary Care provider regarding elevated blood pressure. (if equal or greater than 140/90)    BODY WEIGHT AND YOUR FEET  The following information is included in the after visit summary for all patients. Body weight can be a sensitive issue to discuss in clinic, but we think the following information is very important. Although we focus on the feet and ankles, we do support the overall health of our patients. Many things can cause foot and ankle problems. Foot structure, activity level, foot mechanics and injuries are common causes of pain. One very important issue that often goes unmentioned, is body weight. Extra weight can cause increased stress on muscles, ligaments, bones and tendons. Sometimes just a few extra pounds is all it takes to put one over her/his threshold. Without reducing that stress, it can be difficult to alleviate pain. As Foot & Ankle specialists, our job is addressing the lower extremity problem and possible causes. Regarding extra body weight, we encourage patients to discuss diet and  weight management plans with their primary care doctors. It is this team approach that gives you the best opportunity for pain relief and getting you back on your feet. [facility] has a Comprehensive Weight Management Program. This program includes counseling, education, non-surgical and surgical approaches to weight loss. If you are interested in learning more either talk to you primary care provider or call [phone].

## 2020-08-10 ENCOUNTER — OFFICE VISIT (OUTPATIENT)
Dept: OPTOMETRY | Facility: CLINIC | Age: 65
End: 2020-08-10
Payer: COMMERCIAL

## 2020-08-10 DIAGNOSIS — H01.006 BLEPHARITIS OF BOTH EYES, UNSPECIFIED EYELID, UNSPECIFIED TYPE: ICD-10-CM

## 2020-08-10 DIAGNOSIS — H52.13 MYOPIA OF BOTH EYES: Primary | ICD-10-CM

## 2020-08-10 DIAGNOSIS — H01.003 BLEPHARITIS OF BOTH EYES, UNSPECIFIED EYELID, UNSPECIFIED TYPE: ICD-10-CM

## 2020-08-10 DIAGNOSIS — H35.411 LATTICE DEGENERATION, RIGHT EYE: ICD-10-CM

## 2020-08-10 DIAGNOSIS — H25.813 MIXED TYPE AGE-RELATED CATARACT, BOTH EYES: ICD-10-CM

## 2020-08-10 PROCEDURE — 92015 DETERMINE REFRACTIVE STATE: CPT | Performed by: OPTOMETRIST

## 2020-08-10 PROCEDURE — 92004 COMPRE OPH EXAM NEW PT 1/>: CPT | Performed by: OPTOMETRIST

## 2020-08-10 ASSESSMENT — VISUAL ACUITY
OS_SC+: +2
METHOD: SNELLEN - LINEAR
OS_SC: 20/60+2
OD_SC+: -2
OD_SC: 20/30
OD_SC: 20/60
OS_SC: 20/40

## 2020-08-10 ASSESSMENT — SLIT LAMP EXAM - LIDS
COMMENTS: UPPER LID DERMATOCHALASIS, MEIBOMIAN GLAND DYSFUNCTION
COMMENTS: UPPER LID DERMATOCHALASIS, MEIBOMIAN GLAND DYSFUNCTION

## 2020-08-10 ASSESSMENT — REFRACTION_MANIFEST
OS_ADD: +2.50
OS_SPHERE: +1.00
OS_CYLINDER: SPHERE
OD_AXIS: 061
OD_ADD: +2.50
OS_SPHERE: -0.75
OD_SPHERE: -0.75
OD_CYLINDER: +0.50
METHOD_AUTOREFRACTION: 1
OD_CYLINDER: SPHERE
OD_SPHERE: -1.00

## 2020-08-10 ASSESSMENT — EXTERNAL EXAM - LEFT EYE: OS_EXAM: NORMAL

## 2020-08-10 ASSESSMENT — CONF VISUAL FIELD
METHOD: COUNTING FINGERS
OD_NORMAL: 1
OS_NORMAL: 1

## 2020-08-10 ASSESSMENT — TONOMETRY
OS_IOP_MMHG: 15
OD_IOP_MMHG: 15
IOP_METHOD: APPLANATION

## 2020-08-10 ASSESSMENT — EXTERNAL EXAM - RIGHT EYE: OD_EXAM: NORMAL

## 2020-08-10 ASSESSMENT — CUP TO DISC RATIO
OS_RATIO: 0.0
OD_RATIO: 0.0

## 2020-08-10 NOTE — LETTER
8/10/2020         RE: Mickey Borden  [address] [place] [address]        Dear Colleague,    [facility] Please see a copy of my visit note below.    Chief Complaint   Patient presents with     Annual Eye Exam      HPI     KELIN: 3+ years ago at [facility]  He wears reading glasses +2.00   SP LASIK in 2002 - monovision  He is noticing his distance is a little worse than it has been in the past.  No other concerns at this time.  He reports when his eyes are dry or red he will use gtts - otc AT's          Last Eye Exam: 3+ years ago  Dilated Previously: Yes    What are you currently using to see?  readers       Distance Vision Acuity: Noticed gradual change in both eyes    Near Vision Acuity: Satisfied with vision while reading and using computer with readers    Eye Comfort: good, notices dryness/redness once in a while.  Do you use eye drops? : Yes: infrequently: OTC alaway after mowing the lawn  Occupation or Hobbies:  soon to retire    Annabelle Rivers CPO          Medical, surgical and family histories reviewed and updated 8/10/2020.       OBJECTIVE: See Ophthalmology exam    ASSESSMENT:    ICD-10-CM    1. Myopia of both eyes  H52.13 REFRACTION     EYE EXAM (SIMPLE-NONBILLABLE)   2. Blepharitis of both eyes, unspecified eyelid, unspecified type  H01.003     H01.006    3. Mixed type age-related cataract, both eyes  H25.813    4. Lattice degeneration, right eye  H35.411       PLAN:   Consider distance only and continue w/ over the counter readers      Bridgette Arrington OD     Again, thank you for allowing me to participate in the care of your patient.        Sincerely,        Bridgette Arrington, OD

## 2020-08-10 NOTE — PROGRESS NOTES
Chief Complaint   Patient presents with     Annual Eye Exam      HPI     KELIN: 3+ years ago at [facility]  He wears reading glasses +2.00   SP LASIK in 2002 - monovision  He is noticing his distance is a little worse than it has been in the past.  No other concerns at this time.  He reports when his eyes are dry or red he will use gtts - otc AT's          Last Eye Exam: 3+ years ago  Dilated Previously: Yes    What are you currently using to see?  readers       Distance Vision Acuity: Noticed gradual change in both eyes    Near Vision Acuity: Satisfied with vision while reading and using computer with readers    Eye Comfort: good, notices dryness/redness once in a while.  Do you use eye drops? : Yes: infrequently: OTC alaway after mowing the lawn  Occupation or Hobbies:  soon to retire    Annabelle Rivers CPO          Medical, surgical and family histories reviewed and updated 8/10/2020.       OBJECTIVE: See Ophthalmology exam    ASSESSMENT:    ICD-10-CM    1. Myopia of both eyes  H52.13 REFRACTION     EYE EXAM (SIMPLE-NONBILLABLE)   2. Blepharitis of both eyes, unspecified eyelid, unspecified type  H01.003     H01.006    3. Mixed type age-related cataract, both eyes  H25.813    4. Lattice degeneration, right eye  H35.411       PLAN:   Consider distance only and continue w/ over the counter readers      Bridgette Arrington OD

## 2020-08-10 NOTE — PATIENT INSTRUCTIONS
Thanks for coming in !   You have cataracts in both eyes R>L vision corrects to 20/30 R almost 20/25 L  With a mild distance prescription   You can continue with over the counter readers     Monitor yearly unless vision becomes worse sooner    You have lattice degeneration which is a thinning of the retina. R eye. This puts you at a slighter risk of a tear in the retina.  The signs of a retinal detachment are flashes of light or a curtain covering the vision.  If you should notice any of these changes let me know right away.  If I am not available you should be seen immediately for an eye evaluation.     Blepharitis is a chronic or long term inflammation of the eyelids and eyelashes. It affects all ages. Causes include poor eyelid hygiene, excess oil production, staph bacteria or an allergic reaction.    Blepharitis may appear as greasy flakes on the base of the eyelashes, crusting of eyelashes and mild redness of the eyelid margins.  Sometimes it may result in an acute infection of a gland in the eyelid called a stye and sometimes painless firm nodules can form in the eyelid that do not resolve on their own and must be surgically removed.    Treatment includes warm compresses and lid hygiene with an antimicrobial lid scrub and sometimes a prescription ointment is needed.      Hot compresses/ warm soaks  Warm compresses are very beneficial to the normal functioning of the eye.   They help loosen up the eyelid debris that has collected on the eyelash follicles.  Overabundance of bacterial microorganisms along the eyelashes and lid margins induce stress on the tear film and promote inflammation.  Regular lid hygiene helps diminish the bacterial population to prevent inflammation and infection.  Cleanse lids once daily with a lid cleansing product as directed such as Ocusoft or Sterilid which can be purchased at most pharmacies. Eyelid cleansers maintain clean and healthy eyelid margins. Ocusoft or sterilid are  commercial products that are available as individual wrapped cleansing pads.  Diluted baby shampoo will work, but not as well and is a cheaper alternative.    Directions for warm soaks  There are few methods for hot compresses. Moisten a washcloth with hot water, or microwave for 10 seconds, being careful to not get the cloth too hot.   Then put the washcloth onto your eyelids for 5 minutes. It will cool quickly so a rice pack or eyemask that can be heated and laid on top of the washcloth will help retain the heat.

## 2020-08-19 ENCOUNTER — OFFICE VISIT (OUTPATIENT)
Dept: OPTOMETRY | Facility: CLINIC | Age: 65
End: 2020-08-19
Payer: COMMERCIAL

## 2020-08-19 DIAGNOSIS — H25.813 MIXED TYPE AGE-RELATED CATARACT, BOTH EYES: Primary | ICD-10-CM

## 2020-08-19 PROCEDURE — 99207 ZZC NO CHARGE LOS: CPT | Performed by: OPTOMETRIST

## 2020-08-19 NOTE — LETTER
8/19/2020         RE: Mickey Borden  [address] [place] [address]        Dear Colleague,    [facility]orden[facility] Please see a copy of my visit note below.    Patient did not want a recheck, did not want glasses w/  Cataracts) just wanted to return and not see doctor.   It had nothing to do with the prescription feels he sees as good as he can.    Bridgette Arrington OD     Again, thank you for allowing me to participate in the care of your patient.        Sincerely,        Bridgette Arrington, OD

## 2020-08-19 NOTE — PROGRESS NOTES
Patient did not want a recheck, did not want glasses w/  Cataracts) just wanted to return and not see doctor.   It had nothing to do with the prescription feels he sees as good as he can.    Bridgette Arrington OD

## 2020-09-02 ENCOUNTER — TELEPHONE (OUTPATIENT)
Dept: OTOLARYNGOLOGY | Facility: CLINIC | Age: 65
End: 2020-09-02

## 2020-09-02 NOTE — TELEPHONE ENCOUNTER
Attempted to call pt to discuss. Unable to leave a vm message as pts mailbox was full. Will try contacting pt via my chart.

## 2020-09-02 NOTE — TELEPHONE ENCOUNTER
Assessment and Plan:    1. Benign essential HTN  Normal here today and normal at nurse visit in June, but high at home. I suspect this is due to her machine either not working or not doing this correctly. Discussed how to take this correctly and will follow up with nurse visit in 1 week so she can bring her machine and see if it correlates. Continue current medication for now.   ______________________________________________________________________  Subjective:    Chief Complaint:  Follow up HTN    HPI:  Erin is a 61 y.o. year old female here to follow up HTN    Started amlodipine 5 mg daily in June. She has been checking her BP at home and it has been high almost every time that she has checked it. She notes that she has been using a wrist cuff and does not elevate her wrist to the level of her heart as she was not aware to do that. She has not had any significant side effects from this medication.     Medications:  Current Outpatient Medications on File Prior to Visit   Medication Sig Dispense Refill    amLODIPine (NORVASC) 5 MG tablet TAKE 1 TABLET(5 MG) BY MOUTH EVERY DAY 30 tablet 2    estradiol cypionate (DEPO-ESTRADIOL) 5 mg/mL injection Inject 1 mL (5 mg total) into the muscle every 28 days. 5 mL 12    LINZESS 145 mcg Cap capsule TAKE 1 CAPSULE PO D  1    methylphenidate HCl (CONCERTA) 54 MG CR tablet Take 54 mg by mouth daily as needed.       testosterone cypionate (DEPOTESTOTERONE CYPIONATE) 200 mg/mL injection Inject 0.13 mLs (26 mg total) into the muscle every 28 days. 5 mL 0    traZODone (DESYREL) 50 MG tablet Take 25 mg by mouth nightly as needed for Insomnia.       zolpidem (AMBIEN) 5 MG Tab Take 2.5 mg by mouth nightly as needed.       diclofenac sodium (VOLTAREN) 1 % Gel Apply 2 g topically 4 (four) times daily as needed. 100 g 2    nabumetone (RELAFEN) 500 MG tablet Take 1 tablet (500 mg total) by mouth 2 (two) times daily with meals. (Patient not taking: Reported on 9/2/2020) 60 tablet  Pt had prevnar injection on 2/4/2020 at [facility].    "1     Current Facility-Administered Medications on File Prior to Visit   Medication Dose Route Frequency Provider Last Rate Last Dose    estradiol cypionate 5 mg/mL injection 10 mg  10 mg Intramuscular Q28 Days Gertrudis Barbosa MD   10 mg at 10/16/19 0950       Review of Systems:  Review of Systems   Constitutional: Negative for chills and fever.   Respiratory: Negative for shortness of breath and wheezing.    Cardiovascular: Negative for chest pain and leg swelling.   Gastrointestinal: Negative for constipation and diarrhea.   Neurological: Negative for seizures and syncope.       Past Medical History:  Past Medical History:   Diagnosis Date    ADD (attention deficit disorder)     Anxiety     Hypertension 06/2020    Insomnia     Menopausal symptoms        Objective:    Vitals:  Vitals:    09/02/20 0906   BP: 110/60   Pulse: 75   Resp: 18   Temp: 97.2 °F (36.2 °C)   TempSrc: Temporal   SpO2: 99%   Weight: 55.2 kg (121 lb 11.1 oz)   Height: 5' 3" (1.6 m)   PainSc:   2   PainLoc: Finger       Physical Exam  Vitals signs reviewed.   Constitutional:       General: She is not in acute distress.     Appearance: She is well-developed.   Eyes:      General:         Right eye: No discharge.         Left eye: No discharge.      Conjunctiva/sclera: Conjunctivae normal.   Cardiovascular:      Rate and Rhythm: Normal rate and regular rhythm.   Pulmonary:      Effort: Pulmonary effort is normal. No respiratory distress.   Skin:     General: Skin is warm and dry.   Neurological:      Mental Status: She is alert and oriented to person, place, and time.   Psychiatric:         Behavior: Behavior normal.         Thought Content: Thought content normal.         Judgment: Judgment normal.         Elvira Buckner MD  Internal Medicine    "

## 2020-09-10 ENCOUNTER — TELEPHONE (OUTPATIENT)
Dept: PEDIATRICS | Facility: CLINIC | Age: 65
End: 2020-09-10
Payer: COMMERCIAL

## 2020-09-10 NOTE — TELEPHONE ENCOUNTER
Patient Quality Outreach      Summary:    Patient is due/failing the following:   Annual wellness, date due: 10/23/2020    Type of outreach:    Sent [employer] message.    Questions for provider review:    None                                                                                   **Start Working phrase here:**       Patient has the following on his problem list/HM:   Immunizations       Health Maintenance Due   Topic     Flu Vaccine (1)                                                     Gianna Gao MA 3:06 PM 9/10/2020       Chart routed to Care Team.

## 2020-09-22 ENCOUNTER — OFFICE VISIT (OUTPATIENT)
Dept: OTOLARYNGOLOGY | Facility: CLINIC | Age: 65
End: 2020-09-22
Payer: COMMERCIAL

## 2020-09-22 VITALS
HEIGHT: 61 IN | BODY MASS INDEX: 29.64 KG/M2 | HEART RATE: 60 BPM | WEIGHT: 157 LBS | OXYGEN SATURATION: 98 % | TEMPERATURE: 98.1 F

## 2020-09-22 DIAGNOSIS — J38.3 OTHER DISEASES OF VOCAL CORDS: ICD-10-CM

## 2020-09-22 DIAGNOSIS — J38.5 LARYNGEAL SPASM: Primary | ICD-10-CM

## 2020-09-22 ASSESSMENT — PAIN SCALES - GENERAL: PAINLEVEL: NO PAIN (0)

## 2020-09-22 ASSESSMENT — MIFFLIN-ST. JEOR: SCORE: 1360.53

## 2020-09-22 NOTE — LETTER
9/22/2020       RE: Mickey Borden  [address] [place] [address]     Dear Colleague,    Thank you for referring your patient, Mickey Borden, to the [facility] EAR NOSE AND THROAT at [facility]. Please see a copy of my visit note below.    Mickey Borden is a 64 year old male with a history of adductor laryngeal dystonia and laryngeal spasm.  he was last injected on 7/21/2020. he had a good response to the last injection. The last dose given was 0.13 units into each thyroarytenoid muscle.  After the injection  he had a breathy dysphonia for 0 weeks.There was no Dysphagia or Dyspnea.  The response lasted for 2 months.   Our plan is to give 0.19 units of Botulinum A toxin into  the left thyroarytenoid muscle today. This was diluted with the injection at a concentration of 6.25 units of botulinum A toxin per cubic centimeter saline solution. We then injected 0.03 cc of volume to each side.       PROCEDURE: After obtaining consent, the patient was placed in the supine position. Ground and reference electrodes were applied. The anterior neck was cleaned with an alcohol swab.  Using a 27-gauge, unipolar electromyography needle, the larynx was entered through the cricothyroid space.  0.19 units of botulinum A toxin was injected into each thyroarytenoid muscle.  There was a Strong EMG response to phonation on the left side and a Moderate EMG response to phonation on the right side.  The total amount of botulinum A toxin delivered today was 0.38 units. An additional 5 units of botulinum A toxin was necessarily wasted in preparation for the injection. he tolerated the procedure well and left the clinic after a short observation period.         The EMG was necessary specifically in this case to identify areas of muscle overactivity as well as to access the thyroarytenoid muscle which is beneath the thyroid cartilage and is not otherwise directly accessible without EMG  guidance.    PLAN: I will have her  follow up on a PRN basis. It is anticipated that repeat injections will be required over the long term.          Again, thank you for allowing me to participate in the care of your patient.      Sincerely,    Tae Fritz MD

## 2020-09-22 NOTE — PATIENT INSTRUCTIONS
1.  You were seen in the ENT Clinic today by . If you have any questions or concerns after your appointment, please call [phone]. Press option #1 for scheduling related needs. Press option #3 for Nurse advice.    2.  Plan is to return to clinic on 11/24/20 for repeat botox treatment      My Adorno LPN  [facility] Otolaryngology

## 2020-09-22 NOTE — PROGRESS NOTES
Mickey Borden is a 64 year old male with a history of adductor laryngeal dystonia and laryngeal spasm.  he was last injected on 7/21/2020. he had a good response to the last injection. The last dose given was 0.13 units into each thyroarytenoid muscle.  After the injection  he had a breathy dysphonia for 0 weeks.There was no Dysphagia or Dyspnea.  The response lasted for 2 months.   Our plan is to give 0.19 units of Botulinum A toxin into  the left thyroarytenoid muscle today. This was diluted with the injection at a concentration of 6.25 units of botulinum A toxin per cubic centimeter saline solution. We then injected 0.03 cc of volume to each side.       PROCEDURE: After obtaining consent, the patient was placed in the supine position. Ground and reference electrodes were applied. The anterior neck was cleaned with an alcohol swab.  Using a 27-gauge, unipolar electromyography needle, the larynx was entered through the cricothyroid space.  0.19 units of botulinum A toxin was injected into each thyroarytenoid muscle.  There was a Strong EMG response to phonation on the left side and a Moderate EMG response to phonation on the right side.  The total amount of botulinum A toxin delivered today was 0.38 units. An additional 5 units of botulinum A toxin was necessarily wasted in preparation for the injection. he tolerated the procedure well and left the clinic after a short observation period.         The EMG was necessary specifically in this case to identify areas of muscle overactivity as well as to access the thyroarytenoid muscle which is beneath the thyroid cartilage and is not otherwise directly accessible without EMG guidance.    PLAN: I will have her  follow up on a PRN basis. It is anticipated that repeat injections will be required over the long term.

## 2020-10-13 ENCOUNTER — VIRTUAL VISIT (OUTPATIENT)
Dept: PEDIATRICS | Facility: CLINIC | Age: 65
End: 2020-10-13
Payer: COMMERCIAL

## 2020-10-13 VITALS — TEMPERATURE: 97.6 F | SYSTOLIC BLOOD PRESSURE: 154 MMHG | DIASTOLIC BLOOD PRESSURE: 86 MMHG

## 2020-10-13 DIAGNOSIS — N40.1 BENIGN PROSTATIC HYPERPLASIA WITH NOCTURIA: ICD-10-CM

## 2020-10-13 DIAGNOSIS — E78.5 HYPERLIPIDEMIA LDL GOAL <130: ICD-10-CM

## 2020-10-13 DIAGNOSIS — I10 BENIGN ESSENTIAL HYPERTENSION: Primary | ICD-10-CM

## 2020-10-13 DIAGNOSIS — R35.1 BENIGN PROSTATIC HYPERPLASIA WITH NOCTURIA: ICD-10-CM

## 2020-10-13 PROCEDURE — 99214 OFFICE O/P EST MOD 30 MIN: CPT | Mod: TEL | Performed by: INTERNAL MEDICINE

## 2020-10-13 RX ORDER — TAMSULOSIN HYDROCHLORIDE 0.4 MG/1
CAPSULE ORAL
Qty: 90 CAPSULE | Refills: 3 | Status: SHIPPED | OUTPATIENT
Start: 2020-10-13 | End: 2021-04-05

## 2020-10-13 NOTE — PATIENT INSTRUCTIONS
For now, continue with your current medications    Check your blood pressure 2-3 times per week for the next 1-2 weeks and forward your results    If your pressures remain >140 top or >90 bottom we should increase your diltiazem dose

## 2020-10-13 NOTE — PROGRESS NOTES
"Mickey Borden is a 64 year old male who is being evaluated via a billable telephone visit.      The patient has been notified of following:     \"This telephone visit will be conducted via a call between you and your physician/provider. We have found that certain health care needs can be provided without the need for a physical exam.  This service lets us provide the care you need with a short phone conversation.  If a prescription is necessary we can send it directly to your pharmacy.  If lab work is needed we can place an order for that and you can then stop by our lab to have the test done at a later time.    Telephone visits are billed at different rates depending on your insurance coverage. During this emergency period, for some insurers they may be billed the same as an in-person visit.  Please reach out to your insurance provider with any questions.    If during the course of the call the physician/provider feels a telephone visit is not appropriate, you will not be charged for this service.\"    Patient has given verbal consent for Telephone visit?  Yes    What phone number would you like to be contacted at? [phone]        Subjective     Mickey Borden is a 64 year old male who presents via phone visit today for the following health issues:    HPI     Concern - /86 this morning right arm   Being treated for HTN.  BP Readings from Last 3 Encounters:   10/13/20 (!) 154/86   07/23/20 130/74   01/07/20 (!) 143/108     BP at his dentist's office a few weeks ago was 120/82.    No cardiac sx such as CP, palpitations, PND, orthopnea, GARCIA or peripheral edema.    Checks BP rarely at home.  Advised checking 2-3 times per week for the next 1-2 week.    Also takes ASA daily. Would like rx.    Due for fasting labwork. Did  Not schedule after our visit in May. Encouraged scheduling a lab visit.          Objective          Vitals:  No vitals were obtained today due to virtual visit.    GEN: No distress  PSYCH: " Alert, affect is normal  RESP: No cough, no audible wheezing, able to talk in full sentences  Remainder of exam unable to be completed due to telephone visit            Assessment/Plan:    Assessment & Plan       ICD-10-CM    1. Benign essential hypertension  I10 aspirin (ASA) 81 MG EC tablet   2. Hyperlipidemia LDL goal <130  E78.5 aspirin (ASA) 81 MG EC tablet   3. Benign prostatic hyperplasia with nocturia  N40.1 tamsulosin (FLOMAX) 0.4 MG capsule    R35.1      For now, continue with your current medications    Check your blood pressure 2-3 times per week for the next 1-2 weeks and forward your results    If your pressures remain >140 top or >90 bottom we should increase your diltiazem dose      Gallito Barba MD  [facility]    Phone call duration:  9 minutes

## 2020-11-03 ENCOUNTER — VIRTUAL VISIT (OUTPATIENT)
Dept: PEDIATRICS | Facility: CLINIC | Age: 65
End: 2020-11-03
Payer: COMMERCIAL

## 2020-11-03 DIAGNOSIS — R42 DIZZINESS: ICD-10-CM

## 2020-11-03 DIAGNOSIS — I67.1 ANEURYSM OF RIGHT INTERNAL CAROTID ARTERY: ICD-10-CM

## 2020-11-03 DIAGNOSIS — I10 BENIGN ESSENTIAL HYPERTENSION: Primary | ICD-10-CM

## 2020-11-03 PROCEDURE — 99214 OFFICE O/P EST MOD 30 MIN: CPT | Mod: TEL | Performed by: INTERNAL MEDICINE

## 2020-11-03 RX ORDER — LISINOPRIL 10 MG/1
10 TABLET ORAL DAILY
Qty: 90 TABLET | Refills: 3 | Status: SHIPPED | OUTPATIENT
Start: 2020-11-03 | End: 2020-12-03

## 2020-11-03 NOTE — PROGRESS NOTES
"Mickey Borden is a 64 year old male who is being evaluated via a billable telephone visit.      The patient has been notified of following:     \"This telephone visit will be conducted via a call between you and your physician/provider. We have found that certain health care needs can be provided without the need for a physical exam.  This service lets us provide the care you need with a short phone conversation.  If a prescription is necessary we can send it directly to your pharmacy.  If lab work is needed we can place an order for that and you can then stop by our lab to have the test done at a later time.    Telephone visits are billed at different rates depending on your insurance coverage. During this emergency period, for some insurers they may be billed the same as an in-person visit.  Please reach out to your insurance provider with any questions.    If during the course of the call the physician/provider feels a telephone visit is not appropriate, you will not be charged for this service.\"    Patient has given verbal consent for Telephone visit?  Yes    What phone number would you like to be contacted at? [phone]    How would you like to obtain your AVS? Denisat    Subjective     Mickey Borden is a 64 year old male who presents via phone visit today for the following health issues:    HPI     ED follow-up visit  Had acute onset of dizziness.   Had just finished his calisthenics, was going to get on the elliptical machine.  100 sit ups, 40 push ups which is typical for him to do.   Was unable to exercise further. Went to ED for evaluation.    Is treated for HTN.   BP Readings from Last 3 Encounters:   10/13/20 (!) 154/86   07/23/20 130/74   01/07/20 (!) 143/108     Rx for diltiazem 180 mg, up from prior 120 mg dose prior to 5/5/20.      Incidental finding of 2 mm aneurysm from the right supraclinoid ICA. Has f/u visit scheduled with neurosurgery.             Objective          Vitals:  No vitals " were obtained today due to virtual visit.    GEN: No distress  PSYCH: Alert, affect is normal  RESP: No cough, no audible wheezing, able to talk in full sentences  Remainder of exam unable to be completed due to telephone visit          Assessment/Plan:    Assessment & Plan       ICD-10-CM    1. Benign essential hypertension  I10 lisinopril (ZESTRIL) 10 MG tablet   2. Dizziness  R42    3. Aneurysm of right internal carotid artery  I67.1      HTN - BP remains uncontrolled.  Discussed options.  Will add lisinopril 10 mg once per day to current regimen.    Dizziness - resolved    Aneurysm - has NS visit scheduled        Return in about 6 months (around 5/3/2021) for Medication Recheck.    Gallito Barba MD  [facility]    Phone call duration:  18 minutes

## 2020-11-03 NOTE — PATIENT INSTRUCTIONS
Add lisinopril 10 mg once per day to your current regimen.     Optimal BP readings: <130 / <85   Too high: >140 / >90   Very high: >180 / >105

## 2020-11-19 ENCOUNTER — MYC MEDICAL ADVICE (OUTPATIENT)
Dept: PEDIATRICS | Facility: CLINIC | Age: 65
End: 2020-11-19

## 2020-11-30 ENCOUNTER — MYC MEDICAL ADVICE (OUTPATIENT)
Dept: OTOLARYNGOLOGY | Facility: CLINIC | Age: 65
End: 2020-11-30

## 2020-12-02 ENCOUNTER — TELEPHONE (OUTPATIENT)
Dept: OTOLARYNGOLOGY | Facility: CLINIC | Age: 65
End: 2020-12-02

## 2020-12-02 NOTE — TELEPHONE ENCOUNTER
Spoke to patient and advised provider would see him on 12/15 as scheduled and would discuss dosage changes at that time.pt verbalized understanding

## 2020-12-03 ENCOUNTER — MYC MEDICAL ADVICE (OUTPATIENT)
Dept: PEDIATRICS | Facility: CLINIC | Age: 65
End: 2020-12-03

## 2020-12-03 DIAGNOSIS — I10 BENIGN ESSENTIAL HYPERTENSION: ICD-10-CM

## 2020-12-03 RX ORDER — LISINOPRIL 10 MG/1
20 TABLET ORAL DAILY
Qty: 90 TABLET | Refills: 3
Start: 2020-12-03 | End: 2020-12-08

## 2020-12-08 ENCOUNTER — MYC MEDICAL ADVICE (OUTPATIENT)
Dept: PEDIATRICS | Facility: CLINIC | Age: 65
End: 2020-12-08

## 2020-12-08 ENCOUNTER — NURSE TRIAGE (OUTPATIENT)
Dept: NURSING | Facility: CLINIC | Age: 65
End: 2020-12-08

## 2020-12-08 DIAGNOSIS — I10 BENIGN ESSENTIAL HYPERTENSION: ICD-10-CM

## 2020-12-08 DIAGNOSIS — Z11.59 SCREENING FOR VIRAL DISEASE: Primary | ICD-10-CM

## 2020-12-08 RX ORDER — LISINOPRIL 10 MG/1
20 TABLET ORAL DAILY
Qty: 90 TABLET | Refills: 0 | Status: SHIPPED | OUTPATIENT
Start: 2020-12-08 | End: 2021-04-05

## 2020-12-08 RX ORDER — LISINOPRIL 10 MG/1
20 TABLET ORAL DAILY
Qty: 90 TABLET | Refills: 3 | Status: SHIPPED | OUTPATIENT
Start: 2020-12-08 | End: 2020-12-08

## 2020-12-08 NOTE — TELEPHONE ENCOUNTER
Increased his dose of Lisinopril and needs new Rx. Ellie Landa RN on 12/8/2020 at 10:53 AM        Thank you for the update. Let me know how your blood pressures are doing over the next few weeks. Also let me know if you start to run low with lisinopril tablets so I can send in a new prescription for 20 mg tablets.     Gallito Barba

## 2020-12-08 NOTE — TELEPHONE ENCOUNTER
"Triage Call:    In February he had symptoms similar to COVID 19. Was looking to get tested for antibodies for COVID 19.  Had contact with co-worker who tested positive for COVID 19 at work.  RN confirmed that it was not close contact, was about 8-10 feet away.      Patient is calling requesting COVID serologic antibody testing.  NOTE: Serologic testing is a blood test for 'antibodies' which are made at 10-14 days after you have had symptoms of COVID or were exposed and had an asymptomatic infection.  This does NOT test you for 'active' infection or tell you if you are contagious.    Are you a healthcare worker? no  Do you currently have a cough, fever, body aches, shortness of breath, or difficulty breathing?  No  Did you previously have cough, fever, body aches, shortness of breath, or difficulty breathing that have now resolved? Has had previous covid symptoms.   Symptoms began 9 months ago  Symptoms started > 14 days ago. Lab order placed per SARS-CoV-2 Serology test Standing Order using indication \"Previously symptomatic >14d since onset, currently asymptomatic\" and diagnosis code \"Screening for viral disease\" (Z11.59)    The patient was informed: \"Testing is limited each day and it may take time for testing to be available to everyone who has called. You will receive a call within 48-72 hours to schedule the serology testing. Please confirm the best number to reach you is [phone]. If you have any questions about scheduling, call [phone].\"          Reason for Disposition    [1] Caller concerned that exposure to COVID-19 occurred BUT [2] does not meet COVID-19 EXPOSURE criteria from CDC    Additional Information    Negative: [1] COVID-19 EXPOSURE (Close Contact) within last 14 days AND [2] needs COVID-19 lab test to return to work AND [3] NO symptoms    Negative: [1] COVID-19 EXPOSURE (Close Contact) within last 14 days AND [2] exposed person is a healthcare worker who was NOT using all recommended " personal protective equipment (i.e., a respirator-N95 mask, eye protection, gloves, and gown) AND [3] NO symptoms    Negative: COVID-19 has been diagnosed by a healthcare provider (HCP)    Negative: COVID-19 lab test positive    Negative: [1] Symptoms of COVID-19 (e.g., cough, fever, SOB, or others) AND [2] lives in an area with community spread    Negative: [1] Symptoms of COVID-19 (e.g., cough, fever, SOB, or others) AND [2] within 14 days of EXPOSURE (close contact) with diagnosed or suspected COVID-19 patient    Negative: [1] Symptoms of COVID-19 (e.g., cough, fever, SOB, or others) AND [2] within 14 days of travel from high-risk area for COVID-19 community spread (identified by [facility])    Negative: [1] Difficulty breathing (shortness of breath) occurs AND [2] onset > 14 days after COVID-19 EXPOSURE (Close Contact) AND [3] no community spread where patient lives    Negative: [1] Dry cough occurs AND [2] onset > 14 days after COVID-19 EXPOSURE AND [3] no community spread where patient lives    Negative: [1] Wet cough (i.e., white-yellow, yellow, green, or gibson colored sputum) AND [2] onset > 14 days after COVID-19 EXPOSURE AND [3] no community spread where patient lives    Negative: [1] Common cold symptoms AND [2] onset > 14 days after COVID-19 EXPOSURE AND [3] no community spread where patient lives    Negative: [1] COVID-19 EXPOSURE AND [2] 15 or more days ago AND [3] NO symptoms    Negative: [1] COVID-19 EXPOSURE (Close Contact) AND [2] within last 14 days BUT [3] NO symptoms    Negative: [1] Living in area with community spread (identified by local PHD) BUT [2] NO symptoms    Negative: [1] Travel from area with community spread (identified by CDC) AND [2] within last 14 days BUT [3] NO symptoms    Negative: [1] No COVID-19 EXPOSURE BUT [2] living with someone who was exposed and who has no symptoms of COVID-19    Protocols used: CORONAVIRUS (COVID-19) EXPOSURE-A- 8.4.20

## 2020-12-15 ENCOUNTER — OFFICE VISIT (OUTPATIENT)
Dept: OTOLARYNGOLOGY | Facility: CLINIC | Age: 65
End: 2020-12-15
Payer: COMMERCIAL

## 2020-12-15 VITALS — TEMPERATURE: 97.8 F | SYSTOLIC BLOOD PRESSURE: 147 MMHG | HEART RATE: 62 BPM | DIASTOLIC BLOOD PRESSURE: 95 MMHG

## 2020-12-15 DIAGNOSIS — J38.5 LARYNGEAL SPASM: Primary | ICD-10-CM

## 2020-12-15 DIAGNOSIS — J38.3 OTHER DISEASES OF VOCAL CORDS: ICD-10-CM

## 2020-12-15 PROCEDURE — 99207 PR NO CHARGE LOS: CPT | Performed by: OTOLARYNGOLOGY

## 2020-12-15 PROCEDURE — 64617 CHEMODENER MUSCLE LARYNX EMG: CPT | Mod: 50 | Performed by: OTOLARYNGOLOGY

## 2020-12-15 ASSESSMENT — PAIN SCALES - GENERAL: PAINLEVEL: NO PAIN (0)

## 2020-12-15 NOTE — PATIENT INSTRUCTIONS
1.  You were seen in the ENT Clinic today by Dr. Fritz.     2.  Plan is to return to clinic as needed for repeat botox injection.    If you have any questions or concerns after your appointment, please call [phone]. Press option #1 for scheduling related needs. Press option #3 for Nurse advice.        Jeanne PETE RN  [facility] - Otolaryngology

## 2020-12-15 NOTE — LETTER
12/15/2020       RE: Mickey Borden  [address] [place] [address]     Dear Colleague,    Thank you for referring your patient, Mickey Borden, to the [facility] EAR NOSE AND THROAT CLINIC [place] at [facility]. Please see a copy of my visit note below.    Mickey Borden is a 65 year old male with a history of adductor laryngeal dystonia and laryngeal spasm.  He was last injected on 11/12/2020 at [facility]. he had a minimal response to the last injection. The last dose given was 0.19 units into each thyroarytenoid muscle.  After the injection  he had a breathy dysphonia for 0 weeks.There was no Dysphagia or Dyspnea.  The response lasted for 0.25 months.   Our plan is to give 0.25 units of Botulinum A toxin into  each thyroarytenoid muscle today. This will be the concentration of 6.25 units/cc with an injection volume of 0.04 cc in each thyroid arytenoid muscle.       PROCEDURE: After obtaining consent, the patient was placed in the supine position. Ground and reference electrodes were applied. The anterior neck was cleaned with an alcohol swab.  Using a 27-gauge, unipolar electromyography needle, the larynx was entered through the cricothyroid space.  0.25 units of botulinum A toxin was injected into each thyroarytenoid muscle.  There was a Moderate EMG response to phonation on the left side and a Strong EMG response to phonation on the right side.  The total amount of botulinum A toxin delivered today was 0.5 units. An additional 5 units of botulinum A toxin was necessarily wasted in preparation for the injection. he tolerated the procedure well and left the clinic after a short observation period.         The EMG was necessary specifically in this case to identify areas of muscle overactivity as well as to access the thyroarytenoid muscle which is beneath the thyroid cartilage and is not otherwise directly accessible without EMG guidance.    PLAN: I will  have him  follow up on a PRN basis. It is anticipated that repeat injections will be required over the long term.      Again, thank you for allowing me to participate in the care of your patient.      Sincerely,    Tae Fritz MD

## 2020-12-15 NOTE — NURSING NOTE
Invasive Procedure Safety Checklist  Procedure:  Botox    Responsible person(s):  Complete sections as appropriate and electronically sign and date below.    Staff/Provider  Consent documentation on chart:  YES  H&P is not applicable (when straight local anesthesia is used).    Procedure Team  Completed by comparing informed consent documentation, information on the patient record and/or the marked surgical site, and discussion with the patient/guardian.     Verified:  (Select all that apply)  Patient identification (two indicators)  Procedure to be performed  Procedure site and /or laterality and/or level  Consent  Procedure site:  Site can not be marked due to location.  Provider [place] - Site/Laterality/Level:  No Level or Structure  Staff/Provider:  No images    Procedure Team:  *Pause for the Cause* verbal and active participation of team members- verify:  Patient name:  YES  Procedure to be performed:  YES  Site, laterality and level, noting patient position:  YES    Above steps completed as applicable (Electronic Signature, Title, Date):    Jeanne Corbett RN on 12/15/2020 at 9:34 AM      Note:  Any incidents of wrong patient, wrong procedure, or wrong site are reported using the Occurrence Process already in place.  The occurrence form is required to be completed immediately with this type of event.

## 2020-12-15 NOTE — PROGRESS NOTES
Mickey Borden is a 65 year old male with a history of adductor laryngeal dystonia and laryngeal spasm.  He was last injected on 11/12/2020 at [facility]. he had a minimal response to the last injection. The last dose given was 0.19 units into each thyroarytenoid muscle.  After the injection  he had a breathy dysphonia for 0 weeks.There was no Dysphagia or Dyspnea.  The response lasted for 0.25 months.   Our plan is to give 0.25 units of Botulinum A toxin into  each thyroarytenoid muscle today. This will be the concentration of 6.25 units/cc with an injection volume of 0.04 cc in each thyroid arytenoid muscle.       PROCEDURE: After obtaining consent, the patient was placed in the supine position. Ground and reference electrodes were applied. The anterior neck was cleaned with an alcohol swab.  Using a 27-gauge, unipolar electromyography needle, the larynx was entered through the cricothyroid space.  0.25 units of botulinum A toxin was injected into each thyroarytenoid muscle.  There was a Moderate EMG response to phonation on the left side and a Strong EMG response to phonation on the right side.  The total amount of botulinum A toxin delivered today was 0.5 units. An additional 5 units of botulinum A toxin was necessarily wasted in preparation for the injection. he tolerated the procedure well and left the clinic after a short observation period.         The EMG was necessary specifically in this case to identify areas of muscle overactivity as well as to access the thyroarytenoid muscle which is beneath the thyroid cartilage and is not otherwise directly accessible without EMG guidance.    PLAN: I will have him  follow up on a PRN basis. It is anticipated that repeat injections will be required over the long term.

## 2020-12-23 DIAGNOSIS — I10 BENIGN ESSENTIAL HYPERTENSION: ICD-10-CM

## 2020-12-23 DIAGNOSIS — E78.5 HYPERLIPIDEMIA LDL GOAL <130: ICD-10-CM

## 2020-12-23 DIAGNOSIS — Z11.59 SCREENING FOR VIRAL DISEASE: ICD-10-CM

## 2020-12-23 DIAGNOSIS — Z12.5 SCREENING FOR PROSTATE CANCER: ICD-10-CM

## 2020-12-23 LAB
ALBUMIN SERPL-MCNC: 3.8 G/DL (ref 3.4–5)
ALP SERPL-CCNC: 54 U/L (ref 40–150)
ALT SERPL W P-5'-P-CCNC: 32 U/L (ref 0–70)
ANION GAP SERPL CALCULATED.3IONS-SCNC: 2 MMOL/L (ref 3–14)
AST SERPL W P-5'-P-CCNC: 30 U/L (ref 0–45)
BILIRUB SERPL-MCNC: 0.7 MG/DL (ref 0.2–1.3)
BUN SERPL-MCNC: 19 MG/DL (ref 7–30)
CALCIUM SERPL-MCNC: 8.9 MG/DL (ref 8.5–10.1)
CHLORIDE SERPL-SCNC: 111 MMOL/L (ref 94–109)
CHOLEST SERPL-MCNC: 195 MG/DL
CO2 SERPL-SCNC: 28 MMOL/L (ref 20–32)
CREAT SERPL-MCNC: 1.17 MG/DL (ref 0.66–1.25)
GFR SERPL CREATININE-BSD FRML MDRD: 65 ML/MIN/{1.73_M2}
GLUCOSE SERPL-MCNC: 89 MG/DL (ref 70–99)
HDLC SERPL-MCNC: 62 MG/DL
LDLC SERPL CALC-MCNC: 124 MG/DL
NONHDLC SERPL-MCNC: 133 MG/DL
POTASSIUM SERPL-SCNC: 4.3 MMOL/L (ref 3.4–5.3)
PROT SERPL-MCNC: 7.2 G/DL (ref 6.8–8.8)
PSA SERPL-ACNC: 0.67 UG/L (ref 0–4)
SODIUM SERPL-SCNC: 141 MMOL/L (ref 133–144)
TRIGL SERPL-MCNC: 46 MG/DL

## 2020-12-23 PROCEDURE — 86769 SARS-COV-2 COVID-19 ANTIBODY: CPT | Performed by: FAMILY MEDICINE

## 2020-12-23 PROCEDURE — 80061 LIPID PANEL: CPT | Performed by: FAMILY MEDICINE

## 2020-12-23 PROCEDURE — 80053 COMPREHEN METABOLIC PANEL: CPT | Performed by: FAMILY MEDICINE

## 2020-12-23 PROCEDURE — G0103 PSA SCREENING: HCPCS | Performed by: FAMILY MEDICINE

## 2020-12-23 PROCEDURE — 36415 COLL VENOUS BLD VENIPUNCTURE: CPT | Performed by: FAMILY MEDICINE

## 2020-12-26 LAB
COVID-19 SPIKE RBD ABY TITER: NORMAL
COVID-19 SPIKE RBD ABY: NEGATIVE

## 2021-01-10 ENCOUNTER — HEALTH MAINTENANCE LETTER (OUTPATIENT)
Age: 66
End: 2021-01-10

## 2021-01-11 ENCOUNTER — TELEPHONE (OUTPATIENT)
Dept: PEDIATRICS | Facility: CLINIC | Age: 66
End: 2021-01-11

## 2021-01-11 DIAGNOSIS — I10 BENIGN ESSENTIAL HYPERTENSION: ICD-10-CM

## 2021-01-11 RX ORDER — LISINOPRIL 10 MG/1
20 TABLET ORAL DAILY
Qty: 90 TABLET | Refills: 0 | Status: CANCELLED | OUTPATIENT
Start: 2021-01-11

## 2021-01-12 RX ORDER — LISINOPRIL 20 MG/1
20 TABLET ORAL DAILY
Qty: 90 TABLET | Refills: 0 | Status: SHIPPED | OUTPATIENT
Start: 2021-01-12 | End: 2021-04-05

## 2021-01-12 NOTE — TELEPHONE ENCOUNTER
Routing refill request to provider for review/approval because:  Elevated BP    Antonia Bryson RN on 1/12/2021 at 3:55 PM

## 2021-01-19 ENCOUNTER — OFFICE VISIT (OUTPATIENT)
Dept: OTOLARYNGOLOGY | Facility: CLINIC | Age: 66
End: 2021-01-19
Payer: COMMERCIAL

## 2021-01-19 VITALS — WEIGHT: 155 LBS | HEIGHT: 61 IN | TEMPERATURE: 98.4 F | BODY MASS INDEX: 29.27 KG/M2

## 2021-01-19 DIAGNOSIS — J38.5 LARYNGEAL SPASM: ICD-10-CM

## 2021-01-19 DIAGNOSIS — J38.3 OTHER DISEASES OF VOCAL CORDS: Primary | ICD-10-CM

## 2021-01-19 PROCEDURE — 99207 PR NO CHARGE LOS: CPT | Performed by: OTOLARYNGOLOGY

## 2021-01-19 PROCEDURE — 64617 CHEMODENER MUSCLE LARYNX EMG: CPT | Mod: 50 | Performed by: OTOLARYNGOLOGY

## 2021-01-19 ASSESSMENT — PAIN SCALES - GENERAL: PAINLEVEL: NO PAIN (0)

## 2021-01-19 ASSESSMENT — MIFFLIN-ST. JEOR: SCORE: 1346.46

## 2021-01-19 NOTE — LETTER
1/19/2021       RE: Mickey Borden  [address] [place] [address]     Dear Colleague,    Thank you for referring your patient, Mickey Borden, to the [facility] EAR NOSE AND THROAT CLINIC [place] at [facility]. Please see a copy of my visit note below.    Mickey Borden is a 65 year old male with a history of adductor laryngeal dystonia and laryngeal spasm.  he was last injected on 12/15/2020. he had a good response to the last injection. The last dose given was 0.25 units into each thyroarytenoid muscle. This was at a concentration of 6.25 units/cc with an injection volume of 0.04 cc in each thyroid arytenoid muscle. After the injection  he had a breathy dysphonia for 0 weeks.There was no Dysphagia or Dyspnea.  The response lasted for 1 months.     Our plan is to give 0.25 units of Botulinum A toxin into  each thyroarytenoid muscle today. This will be the concentration of 6.25 units/cc with an injection volume of 0.04 cc in each thyroid arytenoid muscle.     PROCEDURE: After obtaining consent, the patient was placed in the supine position. Ground and reference electrodes were applied. The anterior neck was cleaned with an alcohol swab.  Using a 27-gauge, unipolar electromyography needle, the larynx was entered through the cricothyroid space.  0.25 units of botulinum A toxin was injected into each thyroarytenoid muscle.  There was a Strong EMG response to phonation on the left side and a Moderate EMG response to phonation on the right side.  The total amount of botulinum A toxin delivered today was 0.5 units. An additional 5 units of botulinum A toxin was necessarily wasted in preparation for the injection. he tolerated the procedure well and left the clinic after a short observation period.         The EMG was necessary specifically in this case to identify areas of muscle overactivity as well as to access the thyroarytenoid muscle which is beneath the  thyroid cartilage and is not otherwise directly accessible without EMG guidance.    PLAN: I will have him  follow up on a PRN basis. It is anticipated that repeat injections will be required over the long term.            Again, thank you for allowing me to participate in the care of your patient.      Sincerely,    Tae Fritz MD

## 2021-01-19 NOTE — PATIENT INSTRUCTIONS
1.  You were seen in the ENT Clinic today by . If you have any questions or concerns after your appointment, please call [phone]. Press option #1 for scheduling related needs. Press option #3 for Nurse advice.    2.  Plan is to return to clinic as needed for repeat botox treatment      My Adorno LPN  [facility] - Otolaryngology

## 2021-01-19 NOTE — PROGRESS NOTES
Mickey Borden is a 65 year old male with a history of adductor laryngeal dystonia and laryngeal spasm.  he was last injected on 12/15/2020. he had a good response to the last injection. The last dose given was 0.25 units into each thyroarytenoid muscle. This was at a concentration of 6.25 units/cc with an injection volume of 0.04 cc in each thyroid arytenoid muscle. After the injection  he had a breathy dysphonia for 0 weeks.There was no Dysphagia or Dyspnea.  The response lasted for 1 months.     Our plan is to give 0.25 units of Botulinum A toxin into  each thyroarytenoid muscle today. This will be the concentration of 6.25 units/cc with an injection volume of 0.04 cc in each thyroid arytenoid muscle.     PROCEDURE: After obtaining consent, the patient was placed in the supine position. Ground and reference electrodes were applied. The anterior neck was cleaned with an alcohol swab.  Using a 27-gauge, unipolar electromyography needle, the larynx was entered through the cricothyroid space.  0.25 units of botulinum A toxin was injected into each thyroarytenoid muscle.  There was a Strong EMG response to phonation on the left side and a Moderate EMG response to phonation on the right side.  The total amount of botulinum A toxin delivered today was 0.5 units. An additional 5 units of botulinum A toxin was necessarily wasted in preparation for the injection. he tolerated the procedure well and left the clinic after a short observation period.         The EMG was necessary specifically in this case to identify areas of muscle overactivity as well as to access the thyroarytenoid muscle which is beneath the thyroid cartilage and is not otherwise directly accessible without EMG guidance.    PLAN: I will have him  follow up on a PRN basis. It is anticipated that repeat injections will be required over the long term.

## 2021-03-02 ENCOUNTER — OFFICE VISIT (OUTPATIENT)
Dept: OTOLARYNGOLOGY | Facility: CLINIC | Age: 66
End: 2021-03-02
Payer: COMMERCIAL

## 2021-03-02 VITALS — HEIGHT: 61 IN | WEIGHT: 155 LBS | BODY MASS INDEX: 29.27 KG/M2

## 2021-03-02 DIAGNOSIS — J38.3 OTHER DISEASES OF VOCAL CORDS: Primary | ICD-10-CM

## 2021-03-02 DIAGNOSIS — J38.5 LARYNGEAL SPASM: ICD-10-CM

## 2021-03-02 PROCEDURE — 99207 PR NO CHARGE LOS: CPT | Performed by: OTOLARYNGOLOGY

## 2021-03-02 PROCEDURE — 64617 CHEMODENER MUSCLE LARYNX EMG: CPT | Mod: 50 | Performed by: OTOLARYNGOLOGY

## 2021-03-02 ASSESSMENT — PAIN SCALES - GENERAL: PAINLEVEL: NO PAIN (0)

## 2021-03-02 ASSESSMENT — MIFFLIN-ST. JEOR: SCORE: 1346.46

## 2021-03-02 NOTE — PROGRESS NOTES
Mickey Borden is a 65 year old male with a history of adductor laryngeal dystonia and laryngeal spasm.  he was last injected on 1/19/2021. he had a good response to the last injection. The last dose given was 0.25 units into each thyroarytenoid muscle with a concentration of 6.25 units/cc and an injection volume of 0.04 cc .  After the injection  he had a breathy dysphonia for 0 weeks.There was no Dysphagia or Dyspnea.  The response lasted for 1.5 months.  He was thinking about slightly increasing the dose but he has a longterm party in 2 days.  Our plan is to give 0.25 units of Botulinum A toxin into  each thyroarytenoid muscle today. This will be the concentration of 6.25 units/cc with an injection volume of 0.04 cc in each thyroid arytenoid muscle.      PROCEDURE: After obtaining consent, the patient was placed in the supine position. Ground and reference electrodes were applied. The anterior neck was cleaned with an alcohol swab.  Using a 27-gauge, unipolar electromyography needle, the larynx was entered through the cricothyroid space.  0.25 units of botulinum A toxin was injected into each thyroarytenoid muscle.  There was a Strong EMG response to phonation on the left side and a Moderate EMG response to phonation on the right side.  The total amount of botulinum A toxin delivered today was 0.5 units. An additional 5 units of botulinum A toxin was necessarily wasted in preparation for the injection. he tolerated the procedure well and left the clinic after a short observation period.         The EMG was necessary specifically in this case to identify areas of muscle overactivity as well as to access the thyroarytenoid muscle which is beneath the thyroid cartilage and is not otherwise directly accessible without EMG guidance.    PLAN: I will have him  follow up on a PRN basis. It is anticipated that repeat injections will be required over the long term.

## 2021-03-02 NOTE — NURSING NOTE
Invasive Procedure Safety Checklist  Procedure:  Botox    Responsible person(s):  Complete sections as appropriate and electronically sign and date below.    Staff/Provider  Consent documentation on chart:  YES  H&P is not applicable (when straight local anesthesia is used).    Procedure Team  Completed by comparing informed consent documentation, information on the patient record and/or the marked surgical site, and discussion with the patient/guardian.     Verified:  (Select all that apply)  Patient identification (two indicators)  Procedure to be performed  Procedure site and /or laterality and/or level  Consent  Procedure site:  Site can not be marked due to location.  Provider Kan - Site/Laterality/Level:  No Level or Structure  Staff/Provider:  No images    Procedure Team:  *Pause for the Cause* verbal and active participation of team members- verify:  Patient name:  YES  Procedure to be performed:  YES  Site, laterality and level, noting patient position:  YES    Above steps completed as applicable (Electronic Signature, Title, Date):    Jeanne Tobias RN on 3/2/2021 at 8:35 AM      Note:  Any incidents of wrong patient, wrong procedure, or wrong site are reported using the Occurrence Process already in place.  The occurrence form is required to be completed immediately with this type of event.     08-Feb-2020 17:20

## 2021-03-02 NOTE — PATIENT INSTRUCTIONS
1.  You were seen in the ENT Clinic today by Dr. Fritz.    2.  Plan is to return to clinic 5/4/21 at 8:15 AM for repeat botox injection.    If you have any questions or concerns after your appointment, please call the clinic .   - Clinic phone: [phone]. Press option #1 for scheduling related needs. Press option #3 for Nurse advice.  - Direct phone: [phone]      Jeanne Tobias RN, BSN  [phone]  [facility]  Department of Otolaryngology  [facility]  [email or web address]

## 2021-03-02 NOTE — LETTER
3/2/2021       RE: Mickey Borden  [address] [place] [address]     Dear Colleague,    Thank you for referring your patient, Mickey Borden, to the [facility] EAR NOSE AND THROAT CLINIC [place] at [facility]. Please see a copy of my visit note below.    Mickey Borden is a 65 year old male with a history of adductor laryngeal dystonia and laryngeal spasm.  he was last injected on 1/19/2021. he had a good response to the last injection. The last dose given was 0.25 units into each thyroarytenoid muscle with a concentration of 6.25 units/cc and an injection volume of 0.04 cc .  After the injection  he had a breathy dysphonia for 0 weeks.There was no Dysphagia or Dyspnea.  The response lasted for 1.5 months.  He was thinking about slightly increasing the dose but he has a assisted party in 2 days.  Our plan is to give 0.25 units of Botulinum A toxin into  each thyroarytenoid muscle today. This will be the concentration of 6.25 units/cc with an injection volume of 0.04 cc in each thyroid arytenoid muscle.      PROCEDURE: After obtaining consent, the patient was placed in the supine position. Ground and reference electrodes were applied. The anterior neck was cleaned with an alcohol swab.  Using a 27-gauge, unipolar electromyography needle, the larynx was entered through the cricothyroid space.  0.25 units of botulinum A toxin was injected into each thyroarytenoid muscle.  There was a Strong EMG response to phonation on the left side and a Moderate EMG response to phonation on the right side.  The total amount of botulinum A toxin delivered today was 0.5 units. An additional 5 units of botulinum A toxin was necessarily wasted in preparation for the injection. he tolerated the procedure well and left the clinic after a short observation period.         The EMG was necessary specifically in this case to identify areas of muscle overactivity as well  as to access the thyroarytenoid muscle which is beneath the thyroid cartilage and is not otherwise directly accessible without EMG guidance.    PLAN: I will have him  follow up on a PRN basis. It is anticipated that repeat injections will be required over the long term.            Again, thank you for allowing me to participate in the care of your patient.      Sincerely,    Tae Fritz MD

## 2021-03-06 ENCOUNTER — MYC MEDICAL ADVICE (OUTPATIENT)
Dept: PEDIATRICS | Facility: CLINIC | Age: 66
End: 2021-03-06

## 2021-03-08 NOTE — TELEPHONE ENCOUNTER
MA -     Please check whether pt is due for pneumococcal vaccine & advise. Thanks.    Frida RN  Patient Advocate Liason (PAL)  [facility]

## 2021-04-05 ENCOUNTER — MYC MEDICAL ADVICE (OUTPATIENT)
Dept: PEDIATRICS | Facility: CLINIC | Age: 66
End: 2021-04-05

## 2021-04-05 DIAGNOSIS — E78.5 HYPERLIPIDEMIA LDL GOAL <130: ICD-10-CM

## 2021-04-05 DIAGNOSIS — R35.1 BENIGN PROSTATIC HYPERPLASIA WITH NOCTURIA: ICD-10-CM

## 2021-04-05 DIAGNOSIS — N40.1 BENIGN PROSTATIC HYPERPLASIA WITH NOCTURIA: ICD-10-CM

## 2021-04-05 DIAGNOSIS — I10 BENIGN ESSENTIAL HYPERTENSION: ICD-10-CM

## 2021-04-05 RX ORDER — TAMSULOSIN HYDROCHLORIDE 0.4 MG/1
CAPSULE ORAL
Qty: 90 CAPSULE | Refills: 1 | Status: SHIPPED | OUTPATIENT
Start: 2021-04-05 | End: 2021-06-07

## 2021-04-05 RX ORDER — LISINOPRIL 20 MG/1
20 TABLET ORAL DAILY
Qty: 90 TABLET | Refills: 0 | Status: SHIPPED | OUTPATIENT
Start: 2021-04-05 | End: 2021-06-07

## 2021-04-05 RX ORDER — DILTIAZEM HYDROCHLORIDE 180 MG/1
180 CAPSULE, EXTENDED RELEASE ORAL DAILY
Qty: 90 CAPSULE | Refills: 0 | Status: SHIPPED | OUTPATIENT
Start: 2021-04-05 | End: 2021-06-07

## 2021-04-05 NOTE — TELEPHONE ENCOUNTER
See pt's MC message. Pt had a VV with  for BP f/u on 11/3/20. Due for an OV next month(6 months BP f/u & medcheck). LOV for routine px was on 10/23/19. No future appointment in place.     Currently using [facility] pharmacy. Currently on Flomax 0.4 mg, Diltiazem 180 mg, Aspirin 81 mg & Lisinopril 20 mg.     Scheduled 6 months f/u appointment with  on 5/10 at 8:05 am. Called [facility], I was advised to fax rx to [phone]. Sent 6 months supply on flomax & aspirin and 3 months supply on diltiazem & lisinopril. Sent MC message with appointment detail to pt as per his request.     Updated pt's new address as well.     Frida, RN  Patient Advocate Liason (PAL)  [facility]

## 2021-04-09 ENCOUNTER — MYC MEDICAL ADVICE (OUTPATIENT)
Dept: PEDIATRICS | Facility: CLINIC | Age: 66
End: 2021-04-09

## 2021-04-13 ENCOUNTER — MYC MEDICAL ADVICE (OUTPATIENT)
Dept: PEDIATRICS | Facility: CLINIC | Age: 66
End: 2021-04-13

## 2021-04-13 NOTE — TELEPHONE ENCOUNTER
"Called [facility] to get details. They had 2 different dose of lisinopril on file(10 mg & 20 mg). So, they wanted clarification on what is pt's current dose.      increased his lisinopril to 20 mg on 12/8/20. So, advised pharmacist to discontinue the lisinopril 10 mg & dispense the 20 mg tabs. They will mail the shipment right away. Notified pt as well.    Pt sent 2 MC messages as follows:  Marnie,   I receive another discouraging message from the [facility], which said, \"regarding Lisinopril, before they could ship the meds, they need to talk with my doctor's office as soon as possible about some required details. They said they  have reached out multiple times but haven't heard back yet. They suggest I contact you to speed this process up so my order is not delayed any further\".   The prescription # is [id number], the Invoice # is [id number].   Please contact them if you can to clear this matter up, or suggest another approach. If you want, please reach me on my cell to discuss at [phone].     I just spoke with [employer]. They said they are waiting for you to approve the 20mg from the 10mg Lisinopril tablets.   Please let me know if you can send this approval to them or if there is an issue with this.   Dr. Barba had me taking to 10 mg pills per day, but if I can get the 20mg, just taking one per day, this would be more convenient for me.   Please let me know.   thanks.    Frida, RN  Patient Advocate Liason (PAL)  [facility]          "

## 2021-04-13 NOTE — TELEPHONE ENCOUNTER
See the other encounter.    Frida, RN  Patient Advocate Liason (PAL)  [facility]

## 2021-04-29 DIAGNOSIS — J38.5 LARYNGEAL SPASM: ICD-10-CM

## 2021-04-29 DIAGNOSIS — J38.3 OTHER DISEASES OF VOCAL CORDS: Primary | ICD-10-CM

## 2021-05-04 ENCOUNTER — OFFICE VISIT (OUTPATIENT)
Dept: OTOLARYNGOLOGY | Facility: CLINIC | Age: 66
End: 2021-05-04
Payer: COMMERCIAL

## 2021-05-04 VITALS — HEART RATE: 60 BPM | OXYGEN SATURATION: 99 % | WEIGHT: 160.05 LBS | HEIGHT: 61 IN | BODY MASS INDEX: 30.22 KG/M2

## 2021-05-04 DIAGNOSIS — J38.3 OTHER DISEASES OF VOCAL CORDS: Primary | ICD-10-CM

## 2021-05-04 DIAGNOSIS — J38.5 LARYNGEAL SPASM: ICD-10-CM

## 2021-05-04 PROCEDURE — 99207 PR NO CHARGE LOS: CPT | Performed by: OTOLARYNGOLOGY

## 2021-05-04 PROCEDURE — 64617 CHEMODENER MUSCLE LARYNX EMG: CPT | Mod: 50 | Performed by: OTOLARYNGOLOGY

## 2021-05-04 ASSESSMENT — MIFFLIN-ST. JEOR: SCORE: 1369.38

## 2021-05-04 ASSESSMENT — PAIN SCALES - GENERAL: PAINLEVEL: NO PAIN (0)

## 2021-05-04 NOTE — PATIENT INSTRUCTIONS
1.  You were seen in the ENT Clinic today by Dr. Fritz.     2.  Plan is to return to clinic as needed for repeat botox injection.    If you have any questions or concerns after your appointment, please call the clinic .   - Clinic phone: [phone]. Press option #1 for scheduling related needs. Press option #3 for Nurse advice.  - Direct phone: [phone]      Jeanne Tobias RN, BSN  [phone]  [facility]  Department of Otolaryngology  [facility]  [email or web address]

## 2021-05-04 NOTE — PROGRESS NOTES
Mickey Borden is a 65 year old male with a history of adductor laryngeal dystonia and laryngeal spasm.  he was last injected on 3/2/2021. he had an adequate response to the last injection. The last dose given was 0.25 at a concentration of 6.25 units/cc with an injection volume of 0.04 cc.  Units into each thyroarytenoid muscle.  After the injection  he had a breathy dysphonia for 0 weeks.There was no Dysphagia or Dyspnea.  The response lasted for 1.5 months.   Our plan is to give 0.30 units of Botulinum A toxin  a concentration of 6.25 units/cc with an injection volume of 0.05 cc into  each thyroarytenoid muscle today.      PROCEDURE: After obtaining consent, the patient was placed in the supine position. Ground and reference electrodes were applied. The anterior neck was cleaned with an alcohol swab.  Using a 27-gauge, unipolar electromyography needle, the larynx was entered through the cricothyroid space.  0.3 units of botulinum A toxin was injected into each thyroarytenoid muscle.  There was a Strong EMG response to phonation on the left side and a Weak EMG response to phonation on the right side.  Multiple attempts were needed to get the appropriate positioning on the right side.  The total amount of botulinum A toxin delivered today was 0.6 units. An additional 5 units of botulinum A toxin was necessarily wasted in preparation for the injection. he tolerated the procedure well and left the clinic after a short observation period.         The EMG was necessary specifically in this case to identify areas of muscle overactivity as well as to access the thyroarytenoid muscle which is beneath the thyroid cartilage and is not otherwise directly accessible without EMG guidance.    PLAN: I will have him  follow up on a PRN basis. It is anticipated that repeat injections will be required over the long term.

## 2021-05-04 NOTE — NURSING NOTE
Invasive Procedure Safety Checklist  Procedure:  Botox    Responsible person(s):  Complete sections as appropriate and electronically sign and date below.    Staff/Provider  Consent documentation on chart:  YES  H&P is not applicable (when straight local anesthesia is used).    Procedure Team  Completed by comparing informed consent documentation, information on the patient record and/or the marked surgical site, and discussion with the patient/guardian.     Verified:  (Select all that apply)  Patient identification (two indicators)  Procedure to be performed  Procedure site and /or laterality and/or level  Consent  Procedure site:  Site can not be marked due to location.  Provider Kan - Site/Laterality/Level:  No Level or Structure  Staff/Provider:  No images    Procedure Team:  *Pause for the Cause* verbal and active participation of team members- verify:  Patient name:  YES  Procedure to be performed:  YES  Site, laterality and level, noting patient position:  YES    Above steps completed as applicable (Electronic Signature, Title, Date):    Jeanne Tobias RN on 5/4/2021 at 8:33 AM      Note:  Any incidents of wrong patient, wrong procedure, or wrong site are reported using the Occurrence Process already in place.  The occurrence form is required to be completed immediately with this type of event.

## 2021-05-04 NOTE — LETTER
5/4/2021       RE: Mickey Borden  [address] [place] [facility] [address]     Dear Colleague,    Thank you for referring your patient, Mickey Borden, to the [facility] EAR NOSE AND THROAT CLINIC [place] at [facility]. Please see a copy of my visit note below.    Mickey Borden is a 65 year old male with a history of adductor laryngeal dystonia and laryngeal spasm.  he was last injected on 3/2/2021. he had an adequate response to the last injection. The last dose given was 0.25 at a concentration of 6.25 units/cc with an injection volume of 0.04 cc.  Units into each thyroarytenoid muscle.  After the injection  he had a breathy dysphonia for 0 weeks.There was no Dysphagia or Dyspnea.  The response lasted for 1.5 months.   Our plan is to give 0.30 units of Botulinum A toxin  a concentration of 6.25 units/cc with an injection volume of 0.05 cc into  each thyroarytenoid muscle today.      PROCEDURE: After obtaining consent, the patient was placed in the supine position. Ground and reference electrodes were applied. The anterior neck was cleaned with an alcohol swab.  Using a 27-gauge, unipolar electromyography needle, the larynx was entered through the cricothyroid space.  0.3 units of botulinum A toxin was injected into each thyroarytenoid muscle.  There was a Strong EMG response to phonation on the left side and a Weak EMG response to phonation on the right side.  Multiple attempts were needed to get the appropriate positioning on the right side.  The total amount of botulinum A toxin delivered today was 0.6 units. An additional 5 units of botulinum A toxin was necessarily wasted in preparation for the injection. he tolerated the procedure well and left the clinic after a short observation period.         The EMG was necessary specifically in this case to identify areas of muscle overactivity as well as to access the thyroarytenoid muscle which is beneath  the thyroid cartilage and is not otherwise directly accessible without EMG guidance.    PLAN: I will have him  follow up on a PRN basis. It is anticipated that repeat injections will be required over the long term.            Again, thank you for allowing me to participate in the care of your patient.      Sincerely,    Tae Fritz MD

## 2021-06-07 ENCOUNTER — OFFICE VISIT (OUTPATIENT)
Dept: PEDIATRICS | Facility: CLINIC | Age: 66
End: 2021-06-07
Payer: COMMERCIAL

## 2021-06-07 VITALS
SYSTOLIC BLOOD PRESSURE: 130 MMHG | WEIGHT: 158 LBS | BODY MASS INDEX: 29.85 KG/M2 | DIASTOLIC BLOOD PRESSURE: 80 MMHG | HEART RATE: 60 BPM | RESPIRATION RATE: 16 BRPM | OXYGEN SATURATION: 99 % | TEMPERATURE: 96.8 F

## 2021-06-07 DIAGNOSIS — I10 BENIGN ESSENTIAL HYPERTENSION: ICD-10-CM

## 2021-06-07 DIAGNOSIS — N40.1 BENIGN PROSTATIC HYPERPLASIA WITH NOCTURIA: ICD-10-CM

## 2021-06-07 DIAGNOSIS — R35.1 BENIGN PROSTATIC HYPERPLASIA WITH NOCTURIA: ICD-10-CM

## 2021-06-07 PROCEDURE — 99213 OFFICE O/P EST LOW 20 MIN: CPT | Performed by: INTERNAL MEDICINE

## 2021-06-07 RX ORDER — LISINOPRIL 20 MG/1
20 TABLET ORAL DAILY
Qty: 90 TABLET | Refills: 3 | Status: SHIPPED | OUTPATIENT
Start: 2021-06-07 | End: 2022-05-06

## 2021-06-07 RX ORDER — TAMSULOSIN HYDROCHLORIDE 0.4 MG/1
CAPSULE ORAL
Qty: 90 CAPSULE | Refills: 3 | Status: SHIPPED | OUTPATIENT
Start: 2021-06-07 | End: 2022-05-06

## 2021-06-07 RX ORDER — DILTIAZEM HYDROCHLORIDE 180 MG/1
180 CAPSULE, EXTENDED RELEASE ORAL DAILY
Qty: 90 CAPSULE | Refills: 3 | Status: SHIPPED | OUTPATIENT
Start: 2021-06-07 | End: 2022-05-05

## 2021-06-07 NOTE — PROGRESS NOTES
Assessment & Plan       ICD-10-CM    1. Benign essential hypertension  I10 diltiazem ER (DILT-XR) 180 MG 24 hr capsule     lisinopril (ZESTRIL) 20 MG tablet   2. Benign prostatic hyperplasia with nocturia  N40.1 tamsulosin (FLOMAX) 0.4 MG capsule    R35.1      BP is controlled today w/ current meds.  Labwork is up to date (done in December).  CPM.       Return in about 1 year (around 6/7/2022) for Routine Visit.    Gallito Barba MD  [facility] SHAWANDA Munoz is a 65 year old who presents for the following health issues     History of Present Illness       Hypertension: He presents for follow up of hypertension.  He does not check blood pressure  regularly outside of the clinic. Outside blood pressures have been over 140/90. He does not follow a low salt diet.     He eats 2-3 servings of fruits and vegetables daily.He consumes 0 sweetened beverage(s) daily.He exercises with enough effort to increase his heart rate 30 to 60 minutes per day.  He exercises with enough effort to increase his heart rate 5 days per week.   He is taking medications regularly.       Medications: lisonopril & diltiazem     BP checks at home w/ systolic near 140 and diastolic upper 80's.  No cardiac sx such as CP, palpitations, PND, orthopnea, GARCIA or peripheral edema.    BP Readings from Last 3 Encounters:   06/07/21 132/70   12/15/20 (!) 147/95   10/13/20 (!) 154/86       Wt Readings from Last 4 Encounters:   06/07/21 71.7 kg (158 lb)   05/04/21 72.6 kg (160 lb 0.9 oz)   03/02/21 70.3 kg (155 lb)   01/19/21 70.3 kg (155 lb)     BPH. Tamsulosin working well.         Objective    /80 (BP Location: Left arm)   Pulse 60   Temp 96.8  F (36  C) (Tympanic)   Resp 16   Wt 71.7 kg (158 lb)   SpO2 99%   BMI 29.85 kg/m    Body mass index is 29.85 kg/m .  Physical Exam   GEN: No distress  SKIN: No rashes  LUNGS: Clear to auscultation bilaterally. No rhonchi, rales, wheezes or retractions.  CV: Regular rate and  rhythm.  No murmurs, rubs or gallops. Pulses 2+ radial.  EXTR: No edema

## 2021-07-27 ENCOUNTER — OFFICE VISIT (OUTPATIENT)
Dept: OTOLARYNGOLOGY | Facility: CLINIC | Age: 66
End: 2021-07-27
Payer: COMMERCIAL

## 2021-07-27 DIAGNOSIS — J38.5 LARYNGEAL SPASM: ICD-10-CM

## 2021-07-27 DIAGNOSIS — J38.3 OTHER DISEASES OF VOCAL CORDS: Primary | ICD-10-CM

## 2021-07-27 PROCEDURE — 64617 CHEMODENER MUSCLE LARYNX EMG: CPT | Mod: 50 | Performed by: OTOLARYNGOLOGY

## 2021-07-27 PROCEDURE — 99207 PR NO CHARGE LOS: CPT | Performed by: OTOLARYNGOLOGY

## 2021-07-27 NOTE — NURSING NOTE
Chief Complaint   Patient presents with     RECHECK     Botox       There were no vitals taken for this visit.    Juvenal Morelos, EMT

## 2021-07-27 NOTE — LETTER
7/27/2021       RE: Mickey Borden  [id number]  [place] [address]     Dear Colleague,    Thank you for referring your patient, Mickey Borden, to the [facility] EAR NOSE AND THROAT CLINIC [place] at [facility]. Please see a copy of my visit note below.    Mickey Borden is a 65 year old male with a history of adductor laryngeal dystonia and laryngeal spasm.  he was last injected on 5/4/2021. he had a minimal response to the last injection. The last dose given was 0.20 units of Botulinum A toxin  a concentration of 6.25 units/cc with an injection volume of 0.03 cc  into each thyroarytenoid muscle.  After the injection  he had a breathy dysphonia for 0 weeks.There was no  Stridor.  He does have a very minor dysphagia that is only intermittently noticeable and has been consistent for many years.  He noticed that ever since Covid he has had some more difficulties with his response to the Botox.  The response lasted for 0.5 months.   Our plan is to give  0.30 units of Botulinum A toxin  a concentration of 6.25 units/cc with an injection volume of 0.05 cc  into each thyroarytenoid muscle today.      PROCEDURE: After obtaining consent, the patient was placed in the supine position. Ground and reference electrodes were applied. The anterior neck was cleaned with an alcohol swab.  Using a 27-gauge, unipolar electromyography needle, the larynx was entered through the cricothyroid space.  0.20 units of botulinum A toxin was injected into each thyroarytenoid muscle.  There was a Strong EMG response to phonation on the left side and a Moderate EMG response to phonation on the right side.  The total amount of botulinum A toxin delivered today was 0.4 units. An additional 5 units of botulinum A toxin was necessarily wasted in preparation for the injection. he tolerated the procedure well and left the clinic after a short observation period.       The EMG was  necessary specifically in this case to identify areas of muscle overactivity as well as to access the thyroarytenoid muscle which is beneath the thyroid cartilage and is not otherwise directly accessible without EMG guidance.    PLAN: I will have him  follow up on a PRN basis. It is anticipated that repeat injections will be required over the long term.            Again, thank you for allowing me to participate in the care of your patient.      Sincerely,    Tae Fritz MD

## 2021-07-27 NOTE — PATIENT INSTRUCTIONS
1.  You were seen in the ENT Clinic today by . If you have any questions or concerns after your appointment, please call [phone]. Press option #1 for scheduling related needs. Press option #3 for Nurse advice.    2.  Plan is to return to clinic 9/14/21 for repeat botox treatment      My Adorno LPN  [phone]  [facility] - Otolaryngology

## 2021-07-27 NOTE — PROGRESS NOTES
Mickey Borden is a 65 year old male with a history of adductor laryngeal dystonia and laryngeal spasm.  he was last injected on 5/4/2021. he had a minimal response to the last injection. The last dose given was 0.20 units of Botulinum A toxin  a concentration of 6.25 units/cc with an injection volume of 0.03 cc  into each thyroarytenoid muscle.  After the injection  he had a breathy dysphonia for 0 weeks.There was no  Stridor.  He does have a very minor dysphagia that is only intermittently noticeable and has been consistent for many years.  He noticed that ever since Covid he has had some more difficulties with his response to the Botox.  The response lasted for 0.5 months.   Our plan is to give  0.20 units of Botulinum A toxin  a concentration of 6.25 units/cc with an injection volume of 0.05 cc  into each thyroarytenoid muscle today.      PROCEDURE: After obtaining consent, the patient was placed in the supine position. Ground and reference electrodes were applied. The anterior neck was cleaned with an alcohol swab.  Using a 27-gauge, unipolar electromyography needle, the larynx was entered through the cricothyroid space.  0.20 units of botulinum A toxin was injected into each thyroarytenoid muscle.  There was a Strong EMG response to phonation on the left side and a Moderate EMG response to phonation on the right side.  The total amount of botulinum A toxin delivered today was 0.4 units. An additional 5 units of botulinum A toxin was necessarily wasted in preparation for the injection. he tolerated the procedure well and left the clinic after a short observation period.         The EMG was necessary specifically in this case to identify areas of muscle overactivity as well as to access the thyroarytenoid muscle which is beneath the thyroid cartilage and is not otherwise directly accessible without EMG guidance.    PLAN: I will have him  follow up on a PRN basis. It is anticipated that repeat injections will  be required over the long term.

## 2021-09-14 ENCOUNTER — OFFICE VISIT (OUTPATIENT)
Dept: OTOLARYNGOLOGY | Facility: CLINIC | Age: 66
End: 2021-09-14
Payer: COMMERCIAL

## 2021-09-14 VITALS — BODY MASS INDEX: 29.83 KG/M2 | HEIGHT: 61 IN | WEIGHT: 158 LBS

## 2021-09-14 DIAGNOSIS — J38.5 LARYNGEAL SPASM: ICD-10-CM

## 2021-09-14 DIAGNOSIS — J38.3 OTHER DISEASES OF VOCAL CORDS: Primary | ICD-10-CM

## 2021-09-14 PROCEDURE — 64617 CHEMODENER MUSCLE LARYNX EMG: CPT | Mod: 50 | Performed by: OTOLARYNGOLOGY

## 2021-09-14 PROCEDURE — 99207 PR NO CHARGE LOS: CPT | Performed by: OTOLARYNGOLOGY

## 2021-09-14 ASSESSMENT — MIFFLIN-ST. JEOR: SCORE: 1360.06

## 2021-09-14 NOTE — PATIENT INSTRUCTIONS
1.  You were seen in the ENT Clinic today by . If you have any questions or concerns after your appointment, please call [phone]. Press option #1 for scheduling related needs. Press option #3 for Nurse advice.    2.  Plan is to return to clinic 11/2/21 for repeat botox injection      My Adorno LPN  [phone]  [facility] - Otolaryngology

## 2021-09-14 NOTE — LETTER
9/14/2021       RE: Mickey Borden  [id number]  [facility] [address]     Dear Colleague,    Thank you for referring your patient, Mickey Borden, to the [facility] EAR NOSE AND THROAT CLINIC [place] at [facility]. Please see a copy of my visit note below.    Mickey Borden is a 65 year old male with a history of adductor laryngeal dystonia and laryngeal spasm.  he was last injected on 7/27/2021. he had a good response to the last injection. The last dose given was  0.20 units of Botulinum A toxin  a concentration of 6.25 units/cc with an injection volume of 0.05 cc  units into each thyroarytenoid muscle.  After the injection  he had a breathy dysphonia for 0 weeks.There was no Dysphagia or Dyspnea.  The response lasted for 1.5 months.   Our plan is to give 0.25 units of Botulinum A toxin into  each thyroarytenoid muscle today.      PROCEDURE: After obtaining consent, the patient was placed in the supine position. Ground and reference electrodes were applied. The anterior neck was cleaned with an alcohol swab.  Using a 27-gauge, unipolar electromyography needle, the larynx was entered through the cricothyroid space.  0.25 units of botulinum A toxin was injected into each thyroarytenoid muscle.  There was a Strong EMG response to phonation on the left side and a Moderate EMG response to phonation on the right side.  The total amount of botulinum A toxin delivered today was 0.5 units. An additional 5 units of botulinum A toxin was necessarily wasted in preparation for the injection. he tolerated the procedure well and left the clinic after a short observation period.         The EMG was necessary specifically in this case to identify areas of muscle overactivity as well as to access the thyroarytenoid muscle which is beneath the thyroid cartilage and is not otherwise directly accessible without EMG guidance.    PLAN: I will have him  follow up on a  PRN basis. It is anticipated that repeat injections will be required over the long term.        Again, thank you for allowing me to participate in the care of your patient.      Sincerely,    Tae Fritz MD

## 2021-09-14 NOTE — PROGRESS NOTES
Mickey Borden is a 65 year old male with a history of adductor laryngeal dystonia and laryngeal spasm.  he was last injected on 7/27/2021. he had a good response to the last injection. The last dose given was  0.20 units of Botulinum A toxin  a concentration of 6.25 units/cc with an injection volume of 0.05 cc  units into each thyroarytenoid muscle.  After the injection  he had a breathy dysphonia for 0 weeks.There was no Dysphagia or Dyspnea.  The response lasted for 1.5 months.   Our plan is to give 0.25 units of Botulinum A toxin into  each thyroarytenoid muscle today.      PROCEDURE: After obtaining consent, the patient was placed in the supine position. Ground and reference electrodes were applied. The anterior neck was cleaned with an alcohol swab.  Using a 27-gauge, unipolar electromyography needle, the larynx was entered through the cricothyroid space.  0.25 units of botulinum A toxin was injected into each thyroarytenoid muscle.  There was a Strong EMG response to phonation on the left side and a Moderate EMG response to phonation on the right side.  The total amount of botulinum A toxin delivered today was 0.5 units. An additional 5 units of botulinum A toxin was necessarily wasted in preparation for the injection. he tolerated the procedure well and left the clinic after a short observation period.         The EMG was necessary specifically in this case to identify areas of muscle overactivity as well as to access the thyroarytenoid muscle which is beneath the thyroid cartilage and is not otherwise directly accessible without EMG guidance.    PLAN: I will have him  follow up on a PRN basis. It is anticipated that repeat injections will be required over the long term.

## 2021-10-28 ENCOUNTER — MYC MEDICAL ADVICE (OUTPATIENT)
Dept: PEDIATRICS | Facility: CLINIC | Age: 66
End: 2021-10-28

## 2021-11-02 ENCOUNTER — OFFICE VISIT (OUTPATIENT)
Dept: OTOLARYNGOLOGY | Facility: CLINIC | Age: 66
End: 2021-11-02
Payer: COMMERCIAL

## 2021-11-02 DIAGNOSIS — J38.5 LARYNGEAL SPASM: ICD-10-CM

## 2021-11-02 DIAGNOSIS — J38.3 OTHER DISEASES OF VOCAL CORDS: Primary | ICD-10-CM

## 2021-11-02 PROCEDURE — 99207 PR NO CHARGE LOS: CPT | Performed by: OTOLARYNGOLOGY

## 2021-11-02 PROCEDURE — 64617 CHEMODENER MUSCLE LARYNX EMG: CPT | Mod: 50 | Performed by: OTOLARYNGOLOGY

## 2021-11-02 NOTE — LETTER
11/2/2021       RE: Mickey Borden  [id number]  [facility] [address]     Dear Colleague,    Thank you for referring your patient, Mickey Borden, to the [facility] EAR NOSE AND THROAT CLINIC [place] at [facility]. Please see a copy of my visit note below.    Mickey Borden is a 65 year old male with a history of adductor laryngeal dystonia and laryngeal spasm.  he was last injected on 9/14/2021. he had a good response to the last injection. The last dose given was 0.25 units into each thyroarytenoid muscle.  After the injection  he had a breathy dysphonia for 1.5 weeks.There was no Dysphagia or Dyspnea.  The response lasted for 2 months.   Our plan is to give 0.25 units of Botulinum A toxin into  each thyroarytenoid muscle today.    PROCEDURE: After obtaining consent, the patient was placed in the supine position. Ground and reference electrodes were applied. The anterior neck was cleaned with an alcohol swab.  Using a 27-gauge, unipolar electromyography needle, the larynx was entered through the cricothyroid space.  0.25 units of botulinum A toxin was injected into each thyroarytenoid muscle.  There was a Strong EMG response to phonation on the left side and a Strong EMG response to phonation on the right side.  The total amount of botulinum A toxin delivered today was 0.5 units. An additional 5 units of botulinum A toxin was necessarily wasted in preparation for the injection. he tolerated the procedure well and left the clinic after a short observation period.       The EMG was necessary specifically in this case to identify areas of muscle overactivity as well as to access the thyroarytenoid muscle which is beneath the thyroid cartilage and is not otherwise directly accessible without EMG guidance.    PLAN: I will have him  follow up on a PRN basis. It is anticipated that repeat injections will be required over the long term.    Again, thank  you for allowing me to participate in the care of your patient.      Sincerely,    Tae Fritz MD

## 2021-11-02 NOTE — PATIENT INSTRUCTIONS
1.  You were seen in the ENT Clinic today by . If you have any questions or concerns after your appointment, please call [phone]. Press option #1 for scheduling related needs. Press option #3 for Nurse advice.    2. Plan is to return to clinic 1/4/22 for repeat botox injection      My Adorno LPN  [phone]  [facility] - Otolaryngology

## 2021-11-02 NOTE — PROGRESS NOTES
Mickey Borden is a 65 year old male with a history of adductor laryngeal dystonia and laryngeal spasm.  he was last injected on 9/14/2021. he had a good response to the last injection. The last dose given was 0.25 units into each thyroarytenoid muscle.  After the injection  he had a breathy dysphonia for 1.5 weeks.There was no Dysphagia or Dyspnea.  The response lasted for 2 months.   Our plan is to give 0.25 units of Botulinum A toxin into  each thyroarytenoid muscle today.      PROCEDURE: After obtaining consent, the patient was placed in the supine position. Ground and reference electrodes were applied. The anterior neck was cleaned with an alcohol swab.  Using a 27-gauge, unipolar electromyography needle, the larynx was entered through the cricothyroid space.  0.25 units of botulinum A toxin was injected into each thyroarytenoid muscle.  There was a Strong EMG response to phonation on the left side and a Strong EMG response to phonation on the right side.  The total amount of botulinum A toxin delivered today was 0.5 units. An additional 5 units of botulinum A toxin was necessarily wasted in preparation for the injection. he tolerated the procedure well and left the clinic after a short observation period.         The EMG was necessary specifically in this case to identify areas of muscle overactivity as well as to access the thyroarytenoid muscle which is beneath the thyroid cartilage and is not otherwise directly accessible without EMG guidance.    PLAN: I will have him  follow up on a PRN basis. It is anticipated that repeat injections will be required over the long term.

## 2021-12-09 ENCOUNTER — MYC MEDICAL ADVICE (OUTPATIENT)
Dept: PEDIATRICS | Facility: CLINIC | Age: 66
End: 2021-12-09
Payer: COMMERCIAL

## 2021-12-14 NOTE — TELEPHONE ENCOUNTER
"Patient had office visit on 06/07/21 and was instructed per PCP to \"Return in about 1 year (around 6/7/2022) for Routine Visit.\" closing encounter.    Gianna Gao MA 10:48 AM 12/14/2021    "

## 2022-01-04 ENCOUNTER — OFFICE VISIT (OUTPATIENT)
Dept: OTOLARYNGOLOGY | Facility: CLINIC | Age: 67
End: 2022-01-04
Payer: COMMERCIAL

## 2022-01-04 DIAGNOSIS — J38.5 LARYNGEAL SPASM: ICD-10-CM

## 2022-01-04 DIAGNOSIS — J38.3 OTHER DISEASES OF VOCAL CORDS: Primary | ICD-10-CM

## 2022-01-04 PROCEDURE — 64617 CHEMODENER MUSCLE LARYNX EMG: CPT | Mod: 50 | Performed by: OTOLARYNGOLOGY

## 2022-01-04 NOTE — LETTER
1/4/2022       RE: Mickey Borden  [address] [facility] [address]     Dear Colleague,    Thank you for referring your patient, Mickey Borden, to the [facility] EAR NOSE AND THROAT CLINIC [place] at [facility]. Please see a copy of my visit note below.    Mickey Borden is a 66 year old male with a history of adductor laryngeal dystonia and laryngeal spasm.  he was last injected on 11/2/2022. he had a fair to good response to the last injection. The last dose given was 0.25 units into each thyroarytenoid muscle.  After the injection  he had a breathy dysphonia for 0 weeks.There was no Dyspnea and very mild dysphagia for a few days.  The response lasted for 1 month.   Our plan is to give 0.25 units of Botulinum A toxin into the anterior aspect each thyroarytenoid muscle today.      PROCEDURE: After obtaining consent, the patient was placed in the supine position. Ground and reference electrodes were applied. The anterior neck was cleaned with an alcohol swab.  Using a 27-gauge, unipolar electromyography needle, the larynx was entered through the cricothyroid space.  0.25 units of botulinum A toxin was injected into each thyroarytenoid muscle.  There was a Strong EMG response to phonation on the left side and a Moderate EMG response to phonation on the right side.  The total amount of botulinum A toxin delivered today was 0.5 units. An additional 5 units of botulinum A toxin was necessarily wasted in preparation for the injection. he tolerated the procedure well and left the clinic after a short observation period.       The EMG was necessary specifically in this case to identify areas of muscle overactivity as well as to access the thyroarytenoid muscle which is beneath the thyroid cartilage and is not otherwise directly accessible without EMG guidance.    PLAN: I will have him  follow up on a PRN basis. It is anticipated that repeat injections  will be required over the long term.    Again, thank you for allowing me to participate in the care of your patient.      Sincerely,    Tae Fritz MD

## 2022-01-04 NOTE — PATIENT INSTRUCTIONS
1.  You were seen in the ENT Clinic today by . If you have any questions or concerns after your appointment, please call [phone]. Press option #1 for scheduling related needs. Press option #3 for Nurse advice.    2.    Plan is to return to clinic march 1.will contact with darrel Adorno LPN  [phone]  [facility] - Otolaryngology

## 2022-01-04 NOTE — PROGRESS NOTES
Mickey Borden is a 66 year old male with a history of adductor laryngeal dystonia and laryngeal spasm.  he was last injected on 11/2/2022. he had a fair to good response to the last injection. The last dose given was 0.25 units into each thyroarytenoid muscle.  After the injection  he had a breathy dysphonia for 0 weeks.There was no Dyspnea and very mild dysphagia for a few days.  The response lasted for 1 month.   Our plan is to give 0.25 units of Botulinum A toxin into the anterior aspect each thyroarytenoid muscle today.      PROCEDURE: After obtaining consent, the patient was placed in the supine position. Ground and reference electrodes were applied. The anterior neck was cleaned with an alcohol swab.  Using a 27-gauge, unipolar electromyography needle, the larynx was entered through the cricothyroid space.  0.25 units of botulinum A toxin was injected into each thyroarytenoid muscle.  There was a Strong EMG response to phonation on the left side and a Moderate EMG response to phonation on the right side.  The total amount of botulinum A toxin delivered today was 0.5 units. An additional 5 units of botulinum A toxin was necessarily wasted in preparation for the injection. he tolerated the procedure well and left the clinic after a short observation period.         The EMG was necessary specifically in this case to identify areas of muscle overactivity as well as to access the thyroarytenoid muscle which is beneath the thyroid cartilage and is not otherwise directly accessible without EMG guidance.    PLAN: I will have him  follow up on a PRN basis. It is anticipated that repeat injections will be required over the long term.

## 2022-01-11 ENCOUNTER — TRANSFERRED RECORDS (OUTPATIENT)
Dept: HEALTH INFORMATION MANAGEMENT | Facility: CLINIC | Age: 67
End: 2022-01-11
Payer: COMMERCIAL

## 2022-01-26 ENCOUNTER — TELEPHONE (OUTPATIENT)
Dept: PEDIATRICS | Facility: CLINIC | Age: 67
End: 2022-01-26
Payer: COMMERCIAL

## 2022-01-26 ENCOUNTER — VIRTUAL VISIT (OUTPATIENT)
Dept: PEDIATRICS | Facility: CLINIC | Age: 67
End: 2022-01-26
Payer: COMMERCIAL

## 2022-01-26 DIAGNOSIS — R35.1 BENIGN PROSTATIC HYPERPLASIA WITH NOCTURIA: ICD-10-CM

## 2022-01-26 DIAGNOSIS — U09.9 POST-COVID CHRONIC COUGH: Primary | ICD-10-CM

## 2022-01-26 DIAGNOSIS — I10 BENIGN ESSENTIAL HYPERTENSION: ICD-10-CM

## 2022-01-26 DIAGNOSIS — N40.1 BENIGN PROSTATIC HYPERPLASIA WITH NOCTURIA: ICD-10-CM

## 2022-01-26 DIAGNOSIS — R05.3 POST-COVID CHRONIC COUGH: Primary | ICD-10-CM

## 2022-01-26 PROCEDURE — 99214 OFFICE O/P EST MOD 30 MIN: CPT | Mod: 95 | Performed by: PHYSICIAN ASSISTANT

## 2022-01-26 RX ORDER — CODEINE PHOSPHATE AND GUAIFENESIN 10; 100 MG/5ML; MG/5ML
1 SOLUTION ORAL
Qty: 60 ML | Refills: 0 | Status: SHIPPED | OUTPATIENT
Start: 2022-01-26 | End: 2022-07-29

## 2022-01-26 RX ORDER — PREDNISONE 20 MG/1
TABLET ORAL
Qty: 20 TABLET | Refills: 0 | Status: SHIPPED | OUTPATIENT
Start: 2022-01-26 | End: 2022-07-29

## 2022-01-26 NOTE — PROGRESS NOTES
Alexander is a 66 year old who is being evaluated via a billable telephone visit.      Assessment & Plan     Post-COVID chronic cough  Given duration and severity of cough, proceed with steroid taper. No antibiotics warranted at this time. cheratussin AC to help relieve symptoms at night. Signs for emergent evaluation discussed with patient. Patient has no further questions.  - predniSONE (DELTASONE) 20 MG tablet; Take 3 tabs by mouth daily x 3 days, then 2 tabs daily x 3 days, then 1 tab daily x 3 days, then 1/2 tab daily x 3 days.  - guaiFENesin-codeine (CHERATUSSIN AC) 100-10 MG/5ML solution; Take 5 mLs by mouth nightly as needed for cough    Greater than 25 minutes was spent with patient today with the majority spent on counseling and coordination of care for the conditions listed above, charting, chart review.    Issac Duarte PA-C  [facility] SHAWANDA Munoz is a 66 year old who presents for the following health issues:  HPI   Persistent cough x one month. Increasing in intensity. Cough is dry. Chest hurts and feels tight from coughing. No wheezing or sob.     Nasal congestion--mild. No headache, sinus pressure.    Cough drops help temporarily. mucinex at night.    All of the other covid symptoms are improved.    covid vaccinated x 3.    Review of Systems   Constitutional, HEENT, cardiovascular, pulmonary, gi and gu systems are negative, except as otherwise noted.      Objective       Vitals:  No vitals were obtained today due to virtual visit.    Physical Exam  alert and no distress  PSYCH: Alert and oriented times 3; coherent speech, normal   rate and volume, able to articulate logical thoughts, able   to abstract reason, no tangential thoughts, no hallucinations   or delusions  His affect is normal  RESP: coughing through exam. Stopping repeatedly to cough  Remainder of exam unable to be completed due to telephone visits    Phone call duration: 20 minutes

## 2022-01-26 NOTE — TELEPHONE ENCOUNTER
Received call from pt  He was diagnosed with Covid 2 weeks ago via home test  Pt continues to have lingering cough- keeps him up at night    VV for today with SDP   If meds are ordered he would like it sent to the [facility] in [place]  Pharmacy pended    Thank you  Sunni Soliz, RN on 1/26/2022 at 10:04 AM

## 2022-02-09 ENCOUNTER — MYC MEDICAL ADVICE (OUTPATIENT)
Dept: PEDIATRICS | Facility: CLINIC | Age: 67
End: 2022-02-09
Payer: COMMERCIAL

## 2022-02-09 ENCOUNTER — TELEPHONE (OUTPATIENT)
Dept: PEDIATRICS | Facility: CLINIC | Age: 67
End: 2022-02-09
Payer: COMMERCIAL

## 2022-02-09 NOTE — TELEPHONE ENCOUNTER
Patient Quality Outreach    Patient is due for the following:   Physical  - 10/23/2020    NEXT STEPS:   Schedule a video visit for Wellness visit    Type of outreach:    Sent [email or web address] message.      Questions for provider review:    None     Deborah Flores CMA

## 2022-02-12 ENCOUNTER — HEALTH MAINTENANCE LETTER (OUTPATIENT)
Age: 67
End: 2022-02-12

## 2022-03-01 ENCOUNTER — OFFICE VISIT (OUTPATIENT)
Dept: OTOLARYNGOLOGY | Facility: CLINIC | Age: 67
End: 2022-03-01
Payer: COMMERCIAL

## 2022-03-01 DIAGNOSIS — J38.3 OTHER DISEASES OF VOCAL CORDS: Primary | ICD-10-CM

## 2022-03-01 DIAGNOSIS — J38.5 LARYNGEAL SPASM: ICD-10-CM

## 2022-03-01 PROCEDURE — 64617 CHEMODENER MUSCLE LARYNX EMG: CPT | Mod: 50 | Performed by: OTOLARYNGOLOGY

## 2022-03-01 NOTE — LETTER
3/1/2022       RE: Mickey Borden  [address]  [facility] [address]     Dear Colleague,    Thank you for referring your patient, Mickey Borden, to the [facility] EAR NOSE AND THROAT CLINIC [place] at [facility]. Please see a copy of my visit note below.    Mickey Borden is a 66 year old male with a history of adductor laryngeal dystonia and laryngeal spasm.  he was last injected on 1/4/2022. he had a good response to the last injection. The last dose given was 0.25 units into the anterior aspect of each thyroarytenoid muscle.  After the injection  he had a breathy dysphonia for 0 weeks.There was no Dyspnea.  And only very mild dysphagia which was similar to what he had had before.  The response lasted for 2 months.   Our plan is to give 0.20 units of Botulinum A toxin at a concentration of 6.5 units/cc into  each thyroarytenoid muscle today.      PROCEDURE: After obtaining consent, the patient was placed in the supine position. Ground and reference electrodes were applied. The anterior neck was cleaned with an alcohol swab.  Using a 27-gauge, unipolar electromyography needle, the larynx was entered through the cricothyroid space.  0.20 units of botulinum A toxin was injected into each thyroarytenoid muscle.  There was a Moderate EMG response to phonation on the left side and a Strong EMG response to phonation on the right side.  The total amount of botulinum A toxin delivered today was 0.4 units. An additional 5 units of botulinum A toxin was necessarily wasted in preparation for the injection. he tolerated the procedure well and left the clinic after a short observation period.         The EMG was necessary specifically in this case to identify areas of muscle overactivity as well as to access the thyroarytenoid muscle which is beneath the thyroid cartilage and is not otherwise directly accessible without EMG guidance.    PLAN: I will have him   follow up on a PRN basis. It is anticipated that repeat injections will be required over the long term.            Again, thank you for allowing me to participate in the care of your patient.      Sincerely,    Tae Fritz MD

## 2022-03-01 NOTE — PROGRESS NOTES
Mickey Borden is a 66 year old male with a history of adductor laryngeal dystonia and laryngeal spasm.  he was last injected on 1/4/2022. he had a good response to the last injection. The last dose given was 0.25 units into the anterior aspect of each thyroarytenoid muscle.  After the injection  he had a breathy dysphonia for 0 weeks.There was no Dyspnea.  And only very mild dysphagia which was similar to what he had had before.  The response lasted for 2 months.   Our plan is to give 0.20 units of Botulinum A toxin at a concentration of 6.5 units/cc into  each thyroarytenoid muscle today.      PROCEDURE: After obtaining consent, the patient was placed in the supine position. Ground and reference electrodes were applied. The anterior neck was cleaned with an alcohol swab.  Using a 27-gauge, unipolar electromyography needle, the larynx was entered through the cricothyroid space.  0.20 units of botulinum A toxin was injected into each thyroarytenoid muscle.  There was a Moderate EMG response to phonation on the left side and a Strong EMG response to phonation on the right side.  The total amount of botulinum A toxin delivered today was 0.4 units. An additional 5 units of botulinum A toxin was necessarily wasted in preparation for the injection. he tolerated the procedure well and left the clinic after a short observation period.         The EMG was necessary specifically in this case to identify areas of muscle overactivity as well as to access the thyroarytenoid muscle which is beneath the thyroid cartilage and is not otherwise directly accessible without EMG guidance.    PLAN: I will have him  follow up on a PRN basis. It is anticipated that repeat injections will be required over the long term.

## 2022-03-16 ENCOUNTER — TRANSFERRED RECORDS (OUTPATIENT)
Dept: HEALTH INFORMATION MANAGEMENT | Facility: CLINIC | Age: 67
End: 2022-03-16
Payer: COMMERCIAL

## 2022-04-22 DIAGNOSIS — J38.5 LARYNGEAL SPASM: Primary | ICD-10-CM

## 2022-04-26 ENCOUNTER — OFFICE VISIT (OUTPATIENT)
Dept: OTOLARYNGOLOGY | Facility: CLINIC | Age: 67
End: 2022-04-26
Payer: COMMERCIAL

## 2022-04-26 VITALS — HEIGHT: 61 IN | BODY MASS INDEX: 29.83 KG/M2 | WEIGHT: 158 LBS

## 2022-04-26 DIAGNOSIS — J38.5 LARYNGEAL SPASM: Primary | ICD-10-CM

## 2022-04-26 DIAGNOSIS — J38.3 OTHER DISEASES OF VOCAL CORDS: ICD-10-CM

## 2022-04-26 PROCEDURE — 64617 CHEMODENER MUSCLE LARYNX EMG: CPT | Mod: 50 | Performed by: OTOLARYNGOLOGY

## 2022-04-26 PROCEDURE — 99207 PR NO CHARGE LOS: CPT | Performed by: OTOLARYNGOLOGY

## 2022-04-26 ASSESSMENT — PAIN SCALES - GENERAL: PAINLEVEL: NO PAIN (0)

## 2022-04-26 NOTE — NURSING NOTE
"Chief Complaint   Patient presents with     Procedure     botox    Height 1.549 m (5' 1\"), weight 71.7 kg (158 lb). Xenia Babcock, EMT  "

## 2022-04-26 NOTE — LETTER
4/26/2022       RE: Mickey Borden  [address]  [facility] [place] [address]     Dear Colleague,    Thank you for referring your patient, Mickey Borden, to the [facility] EAR NOSE AND THROAT CLINIC [place] at [facility]. Please see a copy of my visit note below.    Mickey Borden is a 66 year old male with a history of adductor laryngeal dystonia and laryngeal spasm.  he was last injected on 3/1/2022. he had a fair to good response to the last injection. The last dose given was 0.20 units into into the anterior aspect of each thyroarytenoid muscle.  After the injection  he had a breathy dysphonia for 0 weeks.There was no Dysphagia or Dyspnea.  The response lasted for 2 months.    Our plan is to give 0.25 units of Botulinum A toxin into the anterior aspect of  each thyroarytenoid muscle today.  This is a slight increase in dosage.      PROCEDURE: After obtaining consent, the patient was placed in the supine position. Ground and reference electrodes were applied. The anterior neck was cleaned with an alcohol swab.  Using a 27-gauge, unipolar electromyography needle, the larynx was entered through the cricothyroid space.  0.25 units of botulinum A toxin was injected into each thyroarytenoid muscle.  There was a Strong EMG response to phonation on the left side and a Moderate EMG response to phonation on the right side.  The total amount of botulinum A toxin delivered today was 0.5 units. An additional 5 units of botulinum A toxin was necessarily wasted in preparation for the injection. he tolerated the procedure well and left the clinic after a short observation period.         The EMG was necessary specifically in this case to identify areas of muscle overactivity as well as to access the thyroarytenoid muscle which is beneath the thyroid cartilage and is not otherwise directly accessible without EMG guidance.    PLAN: I will have him  follow up on a PRN  basis. It is anticipated that repeat injections will be required over the long term.    Sincerely,  Tae Fritz MD

## 2022-04-26 NOTE — PATIENT INSTRUCTIONS
1.  You were seen in the ENT Clinic today by . If you have any questions or concerns after your appointment, please call [phone]. Press option #1 for scheduling related needs. Press option #3 for Nurse advice.    2.  Plan is to return to clinic 6/14/22 for repeat botox injection      My Adorno LPN  [phone]  [facility] - Otolaryngology

## 2022-04-26 NOTE — PROGRESS NOTES
Mickey Borden is a 66 year old male with a history of adductor laryngeal dystonia and laryngeal spasm.  he was last injected on 3/1/2022. he had a fair to good response to the last injection. The last dose given was 0.20 units into into the anterior aspect of each thyroarytenoid muscle.  After the injection  he had a breathy dysphonia for 0 weeks.There was no Dysphagia or Dyspnea.  The response lasted for 2 months.    Our plan is to give 0.25 units of Botulinum A toxin into the anterior aspect of  each thyroarytenoid muscle today.  This is a slight increase in dosage.      PROCEDURE: After obtaining consent, the patient was placed in the supine position. Ground and reference electrodes were applied. The anterior neck was cleaned with an alcohol swab.  Using a 27-gauge, unipolar electromyography needle, the larynx was entered through the cricothyroid space.  0.25 units of botulinum A toxin was injected into each thyroarytenoid muscle.  There was a Strong EMG response to phonation on the left side and a Moderate EMG response to phonation on the right side.  The total amount of botulinum A toxin delivered today was 0.5 units. An additional 5 units of botulinum A toxin was necessarily wasted in preparation for the injection. he tolerated the procedure well and left the clinic after a short observation period.         The EMG was necessary specifically in this case to identify areas of muscle overactivity as well as to access the thyroarytenoid muscle which is beneath the thyroid cartilage and is not otherwise directly accessible without EMG guidance.    PLAN: I will have him  follow up on a PRN basis. It is anticipated that repeat injections will be required over the long term.

## 2022-05-01 DIAGNOSIS — I10 BENIGN ESSENTIAL HYPERTENSION: ICD-10-CM

## 2022-05-01 DIAGNOSIS — N40.1 BENIGN PROSTATIC HYPERPLASIA WITH NOCTURIA: ICD-10-CM

## 2022-05-01 DIAGNOSIS — R35.1 BENIGN PROSTATIC HYPERPLASIA WITH NOCTURIA: ICD-10-CM

## 2022-05-03 RX ORDER — DILTIAZEM HYDROCHLORIDE 180 MG/1
CAPSULE, EXTENDED RELEASE ORAL
Qty: 90 CAPSULE | Refills: 3 | OUTPATIENT
Start: 2022-05-03

## 2022-05-03 RX ORDER — TAMSULOSIN HYDROCHLORIDE 0.4 MG/1
CAPSULE ORAL
Qty: 90 CAPSULE | Refills: 3 | OUTPATIENT
Start: 2022-05-03

## 2022-05-03 RX ORDER — LISINOPRIL 20 MG/1
TABLET ORAL
Qty: 90 TABLET | Refills: 3 | OUTPATIENT
Start: 2022-05-03

## 2022-05-05 DIAGNOSIS — I10 BENIGN ESSENTIAL HYPERTENSION: ICD-10-CM

## 2022-05-05 DIAGNOSIS — R35.1 BENIGN PROSTATIC HYPERPLASIA WITH NOCTURIA: ICD-10-CM

## 2022-05-05 DIAGNOSIS — N40.1 BENIGN PROSTATIC HYPERPLASIA WITH NOCTURIA: ICD-10-CM

## 2022-05-05 RX ORDER — DILTIAZEM HYDROCHLORIDE 180 MG/1
180 CAPSULE, EXTENDED RELEASE ORAL DAILY
Qty: 90 CAPSULE | Refills: 0 | Status: SHIPPED | OUTPATIENT
Start: 2022-05-05 | End: 2022-07-29

## 2022-05-05 NOTE — TELEPHONE ENCOUNTER
Reason for Call:  Medication or medication refill:    Do you use a [facility]?  Name of the pharmacy and phone number for the current request: [facility] - [place], [place] - [address] [place] [address]     Name of the medication requested: diltiazem ER (DILT-XR) 180 MG 24 hr capsule    Other request: Patient called requesting refill     Can we leave a detailed message on this number? YES    Phone number patient can be reached at: Home number on file [phone] (home)    Best Time: anytime    Call taken on 5/5/2022 at 7:48 AM by Stacie Haley

## 2022-05-05 NOTE — TELEPHONE ENCOUNTER
Pt states he received e-mails from [facility] stating they need provider's approval for refills.    Cassie Paez on 5/5/2022 at 9:11 AM

## 2022-05-06 RX ORDER — LISINOPRIL 20 MG/1
20 TABLET ORAL DAILY
Qty: 90 TABLET | Refills: 0 | Status: SHIPPED | OUTPATIENT
Start: 2022-05-06 | End: 2022-07-29

## 2022-05-06 RX ORDER — TAMSULOSIN HYDROCHLORIDE 0.4 MG/1
CAPSULE ORAL
Qty: 90 CAPSULE | Refills: 0 | Status: SHIPPED | OUTPATIENT
Start: 2022-05-06 | End: 2022-07-29

## 2022-05-06 NOTE — TELEPHONE ENCOUNTER
Routing request to provider for review/approval because:  Labs not current: creatinine, potassium    Visit is up to date. RN will issue one time 90 day adi refill. Please advise on labs.   Fabiano MEYERS RN

## 2022-05-09 NOTE — TELEPHONE ENCOUNTER
1st attempt: left VM to return call. If pt calls back, please assist in scheduling annual check up.     Gianna Gao MA 2:27 PM 5/9/2022

## 2022-07-12 ENCOUNTER — OFFICE VISIT (OUTPATIENT)
Dept: OTOLARYNGOLOGY | Facility: CLINIC | Age: 67
End: 2022-07-12
Payer: COMMERCIAL

## 2022-07-12 VITALS — WEIGHT: 158 LBS | HEIGHT: 61 IN | BODY MASS INDEX: 29.83 KG/M2

## 2022-07-12 DIAGNOSIS — J38.3 OTHER DISEASES OF VOCAL CORDS: ICD-10-CM

## 2022-07-12 DIAGNOSIS — J38.5 LARYNGEAL SPASM: Primary | ICD-10-CM

## 2022-07-12 PROCEDURE — 99207 PR NO CHARGE LOS: CPT | Performed by: OTOLARYNGOLOGY

## 2022-07-12 PROCEDURE — 64617 CHEMODENER MUSCLE LARYNX EMG: CPT | Mod: 50 | Performed by: OTOLARYNGOLOGY

## 2022-07-12 ASSESSMENT — PAIN SCALES - GENERAL: PAINLEVEL: NO PAIN (0)

## 2022-07-12 NOTE — LETTER
7/12/2022       RE: Mickey Borden  [id number]  [facility] [address]     Dear Colleague,    Thank you for referring your patient, Mickey Borden, to the [facility] EAR NOSE AND THROAT CLINIC [place] at [facility]. Please see a copy of my visit note below.    Mickey Borden is a 66 year old male with a history of adductor laryngeal dystonia and laryngeal spasm.  he was last injected on 4/26/2022. he had a fair response to the last injection. The last dose given was 0.25 units into each thyroarytenoid muscle.  After the injection  he had a breathy dysphonia for 8 weeks.There was no Dysphagia or Dyspnea.  The response lasted for 3 months.   Our plan is to give 0.20 units of  Botulinum A toxin    diluted at a concentration of 0.65 units per cc into  each thyroarytenoid muscle today.      PROCEDURE: After obtaining consent, the patient was placed in the supine position. Ground and reference electrodes were applied. The anterior neck was cleaned with an alcohol swab.  Using a 27-gauge, unipolar electromyography needle, the larynx was entered through the cricothyroid space.  0.2 units of botulinum A toxin was injected into each thyroarytenoid muscle.  There was a Strong EMG response to phonation on the left side and a Weak EMG response to phonation on the right side.  The total amount of botulinum A toxin delivered today was 0.4 units. An additional 5 units of botulinum A toxin was necessarily wasted in preparation for the injection. he tolerated the procedure well and left the clinic after a short observation period.         The EMG was necessary specifically in this case to identify areas of muscle overactivity as well as to access the thyroarytenoid muscle which is beneath the thyroid cartilage and is not otherwise directly accessible without EMG guidance.    PLAN: I will have him  follow up on a PRN basis. It is anticipated that repeat injections will  be required over the long term.    Again, thank you for allowing me to participate in the care of your patient.      Sincerely,    Tae Fritz MD

## 2022-07-12 NOTE — PROGRESS NOTES
Mickey Borden is a 66 year old male with a history of adductor laryngeal dystonia and laryngeal spasm.  he was last injected on 4/26/2022. he had a fair response to the last injection. The last dose given was 0.25 units into each thyroarytenoid muscle.  After the injection  he had a breathy dysphonia for 8 weeks.There was no Dysphagia or Dyspnea.  The response lasted for 3 months.   Our plan is to give 0.20 units of  Botulinum A toxin    diluted at a concentration of 0.65 units per cc into  each thyroarytenoid muscle today.      PROCEDURE: After obtaining consent, the patient was placed in the supine position. Ground and reference electrodes were applied. The anterior neck was cleaned with an alcohol swab.  Using a 27-gauge, unipolar electromyography needle, the larynx was entered through the cricothyroid space.  0.2 units of botulinum A toxin was injected into each thyroarytenoid muscle.  There was a Strong EMG response to phonation on the left side and a Weak EMG response to phonation on the right side.  The total amount of botulinum A toxin delivered today was 0.4 units. An additional 5 units of botulinum A toxin was necessarily wasted in preparation for the injection. he tolerated the procedure well and left the clinic after a short observation period.         The EMG was necessary specifically in this case to identify areas of muscle overactivity as well as to access the thyroarytenoid muscle which is beneath the thyroid cartilage and is not otherwise directly accessible without EMG guidance.    PLAN: I will have him  follow up on a PRN basis. It is anticipated that repeat injections will be required over the long term.

## 2022-07-12 NOTE — PATIENT INSTRUCTIONS
1.  You were seen in the ENT Clinic today by . If you have any questions or concerns after your appointment, please call [phone]. Press option #1 for scheduling related needs. Press option #3 for Nurse advice.    2. Plan is to return to clinic 9/20/22 for repeat botox injection      My Adorno LPN  [phone]  [facility] - Otolaryngology

## 2022-07-25 SDOH — HEALTH STABILITY: PHYSICAL HEALTH: ON AVERAGE, HOW MANY MINUTES DO YOU ENGAGE IN EXERCISE AT THIS LEVEL?: 30 MIN

## 2022-07-25 SDOH — HEALTH STABILITY: PHYSICAL HEALTH: ON AVERAGE, HOW MANY DAYS PER WEEK DO YOU ENGAGE IN MODERATE TO STRENUOUS EXERCISE (LIKE A BRISK WALK)?: 4 DAYS

## 2022-07-25 SDOH — ECONOMIC STABILITY: INCOME INSECURITY: IN THE LAST 12 MONTHS, WAS THERE A TIME WHEN YOU WERE NOT ABLE TO PAY THE MORTGAGE OR RENT ON TIME?: NO

## 2022-07-25 SDOH — ECONOMIC STABILITY: INCOME INSECURITY: HOW HARD IS IT FOR YOU TO PAY FOR THE VERY BASICS LIKE FOOD, HOUSING, MEDICAL CARE, AND HEATING?: NOT HARD AT ALL

## 2022-07-25 SDOH — ECONOMIC STABILITY: FOOD INSECURITY: WITHIN THE PAST 12 MONTHS, THE FOOD YOU BOUGHT JUST DIDN'T LAST AND YOU DIDN'T HAVE MONEY TO GET MORE.: NEVER TRUE

## 2022-07-25 SDOH — ECONOMIC STABILITY: TRANSPORTATION INSECURITY
IN THE PAST 12 MONTHS, HAS THE LACK OF TRANSPORTATION KEPT YOU FROM MEDICAL APPOINTMENTS OR FROM GETTING MEDICATIONS?: NO

## 2022-07-25 SDOH — ECONOMIC STABILITY: FOOD INSECURITY: WITHIN THE PAST 12 MONTHS, YOU WORRIED THAT YOUR FOOD WOULD RUN OUT BEFORE YOU GOT MONEY TO BUY MORE.: NEVER TRUE

## 2022-07-25 SDOH — ECONOMIC STABILITY: TRANSPORTATION INSECURITY
IN THE PAST 12 MONTHS, HAS LACK OF TRANSPORTATION KEPT YOU FROM MEETINGS, WORK, OR FROM GETTING THINGS NEEDED FOR DAILY LIVING?: NO

## 2022-07-25 ASSESSMENT — ACTIVITIES OF DAILY LIVING (ADL): CURRENT_FUNCTION: NO ASSISTANCE NEEDED

## 2022-07-25 ASSESSMENT — ENCOUNTER SYMPTOMS
DYSURIA: 0
NERVOUS/ANXIOUS: 0
FREQUENCY: 1
PALPITATIONS: 0
DIARRHEA: 0
NAUSEA: 0
EYE PAIN: 0
WEAKNESS: 0
CONSTIPATION: 0
HEMATOCHEZIA: 0
JOINT SWELLING: 0
COUGH: 0
DIZZINESS: 0
CHILLS: 0
SORE THROAT: 0
HEADACHES: 0
HEMATURIA: 0
PARESTHESIAS: 0
MYALGIAS: 1
ABDOMINAL PAIN: 0
HEARTBURN: 0
FEVER: 0
SHORTNESS OF BREATH: 0
ARTHRALGIAS: 1

## 2022-07-25 ASSESSMENT — LIFESTYLE VARIABLES
HOW OFTEN DO YOU HAVE SIX OR MORE DRINKS ON ONE OCCASION: NEVER
HOW MANY STANDARD DRINKS CONTAINING ALCOHOL DO YOU HAVE ON A TYPICAL DAY: 1 OR 2

## 2022-07-25 ASSESSMENT — SOCIAL DETERMINANTS OF HEALTH (SDOH)
DO YOU BELONG TO ANY CLUBS OR ORGANIZATIONS SUCH AS CHURCH GROUPS UNIONS, FRATERNAL OR ATHLETIC GROUPS, OR SCHOOL GROUPS?: YES

## 2022-07-29 ENCOUNTER — OFFICE VISIT (OUTPATIENT)
Dept: PEDIATRICS | Facility: CLINIC | Age: 67
End: 2022-07-29
Payer: COMMERCIAL

## 2022-07-29 VITALS
WEIGHT: 155.1 LBS | RESPIRATION RATE: 17 BRPM | SYSTOLIC BLOOD PRESSURE: 130 MMHG | HEIGHT: 62 IN | DIASTOLIC BLOOD PRESSURE: 72 MMHG | HEART RATE: 64 BPM | BODY MASS INDEX: 28.54 KG/M2 | TEMPERATURE: 98.2 F | OXYGEN SATURATION: 97 %

## 2022-07-29 DIAGNOSIS — I10 BENIGN ESSENTIAL HYPERTENSION: ICD-10-CM

## 2022-07-29 DIAGNOSIS — Z00.00 ROUTINE GENERAL MEDICAL EXAMINATION AT A HEALTH CARE FACILITY: Primary | ICD-10-CM

## 2022-07-29 DIAGNOSIS — N40.1 BENIGN PROSTATIC HYPERPLASIA WITH NOCTURIA: ICD-10-CM

## 2022-07-29 DIAGNOSIS — Z12.11 SCREEN FOR COLON CANCER: ICD-10-CM

## 2022-07-29 DIAGNOSIS — R35.1 BENIGN PROSTATIC HYPERPLASIA WITH NOCTURIA: ICD-10-CM

## 2022-07-29 PROCEDURE — G0438 PPPS, INITIAL VISIT: HCPCS | Performed by: INTERNAL MEDICINE

## 2022-07-29 RX ORDER — LISINOPRIL 20 MG/1
20 TABLET ORAL DAILY
Qty: 90 TABLET | Refills: 4 | Status: SHIPPED | OUTPATIENT
Start: 2022-07-29 | End: 2023-10-11

## 2022-07-29 RX ORDER — DILTIAZEM HYDROCHLORIDE 180 MG/1
180 CAPSULE, EXTENDED RELEASE ORAL DAILY
Qty: 90 CAPSULE | Refills: 4 | Status: SHIPPED | OUTPATIENT
Start: 2022-07-29 | End: 2023-10-11

## 2022-07-29 RX ORDER — TAMSULOSIN HYDROCHLORIDE 0.4 MG/1
0.8 CAPSULE ORAL DAILY
Qty: 180 CAPSULE | Refills: 4 | Status: SHIPPED | OUTPATIENT
Start: 2022-07-29 | End: 2023-10-05

## 2022-07-29 ASSESSMENT — ENCOUNTER SYMPTOMS
FREQUENCY: 1
JOINT SWELLING: 0
WEAKNESS: 0
DYSURIA: 0
MYALGIAS: 1
SORE THROAT: 0
CONSTIPATION: 0
DIARRHEA: 0
FEVER: 0
PARESTHESIAS: 0
CHILLS: 0
DIZZINESS: 0
SHORTNESS OF BREATH: 0
COUGH: 0
HEMATURIA: 0
NAUSEA: 0
HEARTBURN: 0
NERVOUS/ANXIOUS: 0
ARTHRALGIAS: 1
ABDOMINAL PAIN: 0
HEMATOCHEZIA: 0
PALPITATIONS: 0
EYE PAIN: 0
HEADACHES: 0

## 2022-07-29 ASSESSMENT — ACTIVITIES OF DAILY LIVING (ADL): CURRENT_FUNCTION: NO ASSISTANCE NEEDED

## 2022-07-29 ASSESSMENT — PAIN SCALES - GENERAL: PAINLEVEL: NO PAIN (0)

## 2022-07-29 NOTE — PROGRESS NOTES
"SUBJECTIVE:   Mickey Borden is a 66 year old male who presents for Preventive Visit.    Are you in the first 12 months of your Medicare coverage?  No    Healthy Habits:     In general, how would you rate your overall health?  Excellent    Frequency of exercise:  6-7 days/week    Duration of exercise:  45-60 minutes    Do you usually eat at least 4 servings of fruit and vegetables a day, include whole grains    & fiber and avoid regularly eating high fat or \"junk\" foods?  Yes    Taking medications regularly:  Yes    Medication side effects:  None    Ability to successfully perform activities of daily living:  No assistance needed    Home Safety:  No safety concerns identified    Hearing Impairment:  No hearing concerns    In the past 6 months, have you been bothered by leaking of urine?  No    In general, how would you rate your overall mental or emotional health?  Excellent      PHQ-2 Total Score: 0    Additional concerns today:  No    Do you feel safe in your environment? Yes    Have you ever done Advance Care Planning? (For example, a Health Directive, POLST, or a discussion with a medical provider or your loved ones about your wishes): No, advance care planning information given to patient to review.  Advanced care planning was discussed at today's visit.     Overall feeling well.    HTN. No cardiac sx such as CP, palpitations, PND, orthopnea, GARCIA or peripheral edema.  BP Readings from Last 3 Encounters:   07/29/22 130/72   06/07/21 130/80   12/15/20 (!) 147/95     BPH. Helped by tamsulosin, but noting up 2-4 times per night.  Offered to increase dose.     Fall risk  Fallen 2 or more times in the past year?: No  Any fall with injury in the past year?: No  click delete button to remove this line now  Cognitive Screening    correct location, and clock hands correctly designating 11:10}NORMAL  3) 3 item recall: Recalls 2 objects   Results: NORMAL clock, 1-2 items recalled: COGNITIVE IMPAIRMENT LESS " LIKELY    Mini-CogTM Copyright S Michele. Licensed by the author for use in [facility]; reprinted with permission (larry@[facility]). All rights reserved.      Do you have sleep apnea, excessive snoring or daytime drowsiness?: no       Social History     Tobacco Use     Smoking status: Never Smoker     Smokeless tobacco: Never Used   Substance Use Topics     Alcohol use: Yes     Comment: moderate         Alcohol Use 7/25/2022   Prescreen: >3 drinks/day or >7 drinks/week? No   Prescreen: >3 drinks/day or >7 drinks/week? -     Current providers sharing in care for this patient include:   Patient Care Team:  Gallito Barba MD as PCP - General (Internal Medicine)  Chelsea Bautista RN as Registered Nurse (Otolaryngology)  Tae Fritz MD as MD (Otolaryngology)  Gallito Barba MD as Assigned PCP  Tae Fritz MD as Assigned Surgical Provider  Casey Wolfe MD as Resident (Otolaryngology)    The following health maintenance items are reviewed in Epic and correct as of today:  Health Maintenance Due   Topic Date Due     ANNUAL REVIEW OF HM ORDERS  Never done     ADVANCE CARE PLANNING  Never done     AORTIC ANEURYSM SCREENING (SYSTEM ASSIGNED)  Never done     MEDICARE ANNUAL WELLNESS VISIT  10/23/2020     COVID-19 Vaccine (4 - Booster for Pfizer series) 02/25/2022               Review of Systems   Constitutional: Negative for chills and fever.   HENT: Negative for congestion, ear pain, hearing loss and sore throat.    Eyes: Negative for pain and visual disturbance.   Respiratory: Negative for cough and shortness of breath.    Cardiovascular: Negative for chest pain, palpitations and peripheral edema.   Gastrointestinal: Negative for abdominal pain, constipation, diarrhea, heartburn, hematochezia and nausea.   Genitourinary: Positive for frequency. Negative for dysuria, genital sores, hematuria, impotence, penile discharge and urgency.   Musculoskeletal: Positive for arthralgias and  "myalgias. Negative for joint swelling.   Skin: Negative for rash.   Neurological: Negative for dizziness, weakness, headaches and paresthesias.   Psychiatric/Behavioral: Negative for mood changes. The patient is not nervous/anxious.        OBJECTIVE:   /72 (BP Location: Right arm, Patient Position: Sitting, Cuff Size: Adult Regular)   Pulse 64   Temp 98.2  F (36.8  C) (Temporal)   Resp 17   Ht 1.562 m (5' 1.5\")   Wt 70.4 kg (155 lb 1.6 oz)   SpO2 97%   BMI 28.83 kg/m   Estimated body mass index is 29.85 kg/m  as calculated from the following:    Height as of 7/12/22: 1.549 m (5' 1\").    Weight as of 7/12/22: 71.7 kg (158 lb).  Physical Exam  GENERAL: healthy, alert and no distress  EYES: PERRL, EOMI  HENT: ear canals and TM's normal  NECK: no adenopathy  RESP: lungs clear to auscultation - no rales, rhonchi or wheezes  CV: regular rate and rhythm, normal S1 S2, no murmur, no peripheral edema and peripheral pulses strong  ABDOMEN: soft, nontender, bowel sounds normal  MS: no gross musculoskeletal defects noted, no edema  SKIN: no suspicious lesions or rashes  NEURO: Normal strength and tone  PSYCH: mentation appears normal, affect normal/bright     ASSESSMENT / PLAN:       ICD-10-CM    1. Routine general medical examination at a health care facility  Z00.00    2. Benign prostatic hyperplasia with nocturia  N40.1 tamsulosin (FLOMAX) 0.4 MG capsule    R35.1    3. Benign essential hypertension  I10 lisinopril (ZESTRIL) 20 MG tablet     diltiazem ER (DILT-XR) 180 MG 24 hr capsule   4. Screen for colon cancer  Z12.11 Colonscopy Screening  Referral       COUNSELING:  Reviewed preventive health counseling, as reflected in patient instructions    Estimated body mass index is 29.85 kg/m  as calculated from the following:    Height as of 7/12/22: 1.549 m (5' 1\").    Weight as of 7/12/22: 71.7 kg (158 lb).        He reports that he has never smoked. He has never used smokeless tobacco.      Appropriate " preventive services were discussed with this patient, including applicable screening as appropriate for cardiovascular disease, diabetes, osteopenia/osteoporosis, and glaucoma.  As appropriate for age/gender, discussed screening for colorectal cancer, prostate cancer. Checklist reviewing preventive services available has been given to the patient.    Reviewed patients plan of care and provided an AVS. The Basic Care Plan (routine screening as documented in Health Maintenance) for Mickey meets the Care Plan requirement. This Care Plan has been established and reviewed with the Patient.    Counseling Resources:  ATP IV Guidelines  Pooled Cohorts Equation Calculator  Breast Cancer Risk Calculator  Breast Cancer: Medication to Reduce Risk  FRAX Risk Assessment  ICSI Preventive Guidelines  Dietary Guidelines for Americans, 2010  [employer]'s My[employer]  ASA Prophylaxis  Lung CA Screening    Gallito Barba MD  [facility]    Identified Health Risks:

## 2022-09-20 ENCOUNTER — OFFICE VISIT (OUTPATIENT)
Dept: OTOLARYNGOLOGY | Facility: CLINIC | Age: 67
End: 2022-09-20
Payer: COMMERCIAL

## 2022-09-20 DIAGNOSIS — J38.3 OTHER DISEASES OF VOCAL CORDS: ICD-10-CM

## 2022-09-20 DIAGNOSIS — J38.5 LARYNGEAL SPASM: Primary | ICD-10-CM

## 2022-09-20 PROCEDURE — 99207 PR NO CHARGE LOS: CPT | Performed by: OTOLARYNGOLOGY

## 2022-09-20 PROCEDURE — 64617 CHEMODENER MUSCLE LARYNX EMG: CPT | Mod: 50 | Performed by: OTOLARYNGOLOGY

## 2022-09-20 ASSESSMENT — PAIN SCALES - GENERAL: PAINLEVEL: NO PAIN (0)

## 2022-09-20 NOTE — PATIENT INSTRUCTIONS
1.  You were seen in the ENT Clinic today by . If you have any questions or concerns after your appointment, please call [phone]. Press option #1 for scheduling related needs. Press option #3 for Nurse advice.    2.  Plan is to return to clinic 11/22/22 for repeat botox injection      My Adorno LPN  [phone]  [facility] - Otolaryngology

## 2022-09-20 NOTE — PROGRESS NOTES
Mickey Borden is a 66 year old male with a history of adductor laryngeal dystonia and laryngeal spasm.  he was last injected on 7/12/2022. he had a fair response to the last injection. The last dose given was  0.20 units of  Botulinum A toxin  diluted at a concentration of 6.25 units per cc  into each thyroarytenoid muscle.  After the injection  he had a breathy dysphonia for 0 weeks.There was no Dysphagia or Dyspnea.  The response lasted for 1 month.   Our plan is to give 0.25 units of Botulinum A toxindiluted at a concentration of 6.25 units per ml, delivering 0.04 ml, into  each thyroarytenoid muscle today.      PROCEDURE: After obtaining consent, the patient was placed in the supine position. Ground and reference electrodes were applied. The anterior neck was cleaned with an alcohol swab.  Using a 27-gauge, unipolar electromyography needle, the larynx was entered through the cricothyroid space.  0.25 units of botulinum A toxin was injected into each thyroarytenoid muscle.  There was a Weak EMG response to phonation on the left side and a Weak EMG response to phonation on the right side.  The total amount of botulinum A toxin delivered today was 0.5 units. An additional 5 units of botulinum A toxin was necessarily wasted in preparation for the injection. he tolerated the procedure well and left the clinic after a short observation period.         The EMG was necessary specifically in this case to identify areas of muscle overactivity as well as to access the thyroarytenoid muscle which is beneath the thyroid cartilage and is not otherwise directly accessible without EMG guidance.    PLAN: I will have him  follow up on a PRN basis. It is anticipated that repeat injections will be required over the long term.

## 2022-09-20 NOTE — LETTER
9/20/2022       RE: Mickey Borden  [id number]  [facility] [id number]     Dear Colleague,    Thank you for referring your patient, Mickey Borden, to the [facility] EAR NOSE AND THROAT CLINIC [place] at [facility]. Please see a copy of my visit note below.    Mickey Borden is a 66 year old male with a history of adductor laryngeal dystonia and laryngeal spasm.  he was last injected on 7/12/2022. he had a fair response to the last injection. The last dose given was  0.20 units of  Botulinum A toxin  diluted at a concentration of 0.65 units per cc  into each thyroarytenoid muscle.  After the injection  he had a breathy dysphonia for 0 weeks.There was no Dysphagia or Dyspnea.  The response lasted for 1 month.   Our plan is to give 0.25 units of Botulinum A toxindiluted at a concentration of 0.65 units per ml, delivering 0.4 ml, into  each thyroarytenoid muscle today.      PROCEDURE: After obtaining consent, the patient was placed in the supine position. Ground and reference electrodes were applied. The anterior neck was cleaned with an alcohol swab.  Using a 27-gauge, unipolar electromyography needle, the larynx was entered through the cricothyroid space.  0.25 units of botulinum A toxin was injected into each thyroarytenoid muscle.  There was a Weak EMG response to phonation on the left side and a Weak EMG response to phonation on the right side.  The total amount of botulinum A toxin delivered today was 0.5 units. An additional 5 units of botulinum A toxin was necessarily wasted in preparation for the injection. he tolerated the procedure well and left the clinic after a short observation period.         The EMG was necessary specifically in this case to identify areas of muscle overactivity as well as to access the thyroarytenoid muscle which is beneath the thyroid cartilage and is not otherwise directly accessible without EMG guidance.    PLAN: I  will have him  follow up on a PRN basis. It is anticipated that repeat injections will be required over the long term.        Again, thank you for allowing me to participate in the care of your patient.      Sincerely,    Tae Fritz MD

## 2022-10-09 ENCOUNTER — HEALTH MAINTENANCE LETTER (OUTPATIENT)
Age: 67
End: 2022-10-09

## 2022-10-11 ENCOUNTER — IMMUNIZATION (OUTPATIENT)
Dept: FAMILY MEDICINE | Facility: CLINIC | Age: 67
End: 2022-10-11
Payer: COMMERCIAL

## 2022-10-11 DIAGNOSIS — Z23 NEED FOR PROPHYLACTIC VACCINATION AND INOCULATION AGAINST INFLUENZA: ICD-10-CM

## 2022-10-11 DIAGNOSIS — Z23 HIGH PRIORITY FOR 2019-NCOV VACCINE: ICD-10-CM

## 2022-10-11 PROCEDURE — 0124A COVID-19,PF,PFIZER BOOSTER BIVALENT: CPT

## 2022-10-11 PROCEDURE — 99207 PR NO CHARGE LOS: CPT

## 2022-10-11 PROCEDURE — 91312 COVID-19,PF,PFIZER BOOSTER BIVALENT: CPT

## 2022-10-11 PROCEDURE — G0008 ADMIN INFLUENZA VIRUS VAC: HCPCS | Mod: 59

## 2022-10-11 PROCEDURE — 90662 IIV NO PRSV INCREASED AG IM: CPT

## 2022-10-26 ENCOUNTER — TRANSFERRED RECORDS (OUTPATIENT)
Dept: HEALTH INFORMATION MANAGEMENT | Facility: CLINIC | Age: 67
End: 2022-10-26

## 2022-11-22 ENCOUNTER — OFFICE VISIT (OUTPATIENT)
Dept: OTOLARYNGOLOGY | Facility: CLINIC | Age: 67
End: 2022-11-22
Payer: COMMERCIAL

## 2022-11-22 VITALS — BODY MASS INDEX: 28.81 KG/M2 | WEIGHT: 155 LBS

## 2022-11-22 DIAGNOSIS — J38.5 LARYNGEAL SPASM: Primary | ICD-10-CM

## 2022-11-22 DIAGNOSIS — J38.3 OTHER DISEASES OF VOCAL CORDS: ICD-10-CM

## 2022-11-22 PROCEDURE — 99207 PR NO CHARGE LOS: CPT | Performed by: OTOLARYNGOLOGY

## 2022-11-22 PROCEDURE — 64617 CHEMODENER MUSCLE LARYNX EMG: CPT | Mod: 50 | Performed by: OTOLARYNGOLOGY

## 2022-11-22 ASSESSMENT — PAIN SCALES - GENERAL: PAINLEVEL: NO PAIN (0)

## 2022-11-22 NOTE — LETTER
11/22/2022       RE: Mickey Borden  [id number]  [place] [address]     Dear Colleague,    Thank you for referring your patient, Mickey Borden, to the [facility] EAR NOSE AND THROAT CLINIC [place] at [facility]. Please see a copy of my visit note below.    Mickey Borden is a 66 year old male with a history of adductor laryngeal dystonia and laryngeal spasm.  he was last injected on 9/20/2022. he had a fair to good response to the last injection. The last dose given was 0.25 units toxin diluted at a concentration of 6.25 units per ml, delivering 0.04 ml,   into each thyroarytenoid muscle.  After the injection  he had a breathy dysphonia for 0 weeks.There was no Dysphagia or Dyspnea.  The response lasted for 1 month.   Our plan is to give 0.25 units of Botulinum A toxin into  each thyroarytenoid muscle today.  This will be diluted at a concentration of 6.25 units/mL delivering 0.04 mL into each thigh arytenoid muscle.      PROCEDURE: After obtaining consent, the patient was placed in the supine position. Ground and reference electrodes were applied. The anterior neck was cleaned with an alcohol swab.  Using a 27-gauge, unipolar electromyography needle, the larynx was entered through the cricothyroid space.  0.25 units of botulinum A toxin was injected into each thyroarytenoid muscle.  There was a Moderate EMG response to phonation on the left side and a Strong EMG response to phonation on the right side.  The total amount of botulinum A toxin delivered today was 0.5 units. An additional 5 units of botulinum A toxin was necessarily wasted in preparation for the injection. he tolerated the procedure well and left the clinic after a short observation period.         The EMG was necessary specifically in this case to identify areas of muscle overactivity as well as to access the thyroarytenoid muscle which is beneath the thyroid cartilage and is not  otherwise directly accessible without EMG guidance.    PLAN: I will have him  follow up on a PRN basis. It is anticipated that repeat injections will be required over the long term.      Again, thank you for allowing me to participate in the care of your patient.      Sincerely,    Tae Fritz MD

## 2022-11-22 NOTE — PROGRESS NOTES
Mickey Borden is a 66 year old male with a history of adductor laryngeal dystonia and laryngeal spasm.  he was last injected on 9/20/2022. he had a fair to good response to the last injection. The last dose given was 0.25 units toxin diluted at a concentration of 6.25 units per ml, delivering 0.04 ml,   into each thyroarytenoid muscle.  After the injection  he had a breathy dysphonia for 0 weeks.There was no Dysphagia or Dyspnea.  The response lasted for 1 month.   Our plan is to give 0.25 units of Botulinum A toxin into  each thyroarytenoid muscle today.  This will be diluted at a concentration of 6.25 units/mL delivering 0.04 mL into each thigh arytenoid muscle.      PROCEDURE: After obtaining consent, the patient was placed in the supine position. Ground and reference electrodes were applied. The anterior neck was cleaned with an alcohol swab.  Using a 27-gauge, unipolar electromyography needle, the larynx was entered through the cricothyroid space.  0.25 units of botulinum A toxin was injected into each thyroarytenoid muscle.  There was a Moderate EMG response to phonation on the left side and a Strong EMG response to phonation on the right side.  The total amount of botulinum A toxin delivered today was 0.5 units. An additional 5 units of botulinum A toxin was necessarily wasted in preparation for the injection. he tolerated the procedure well and left the clinic after a short observation period.         The EMG was necessary specifically in this case to identify areas of muscle overactivity as well as to access the thyroarytenoid muscle which is beneath the thyroid cartilage and is not otherwise directly accessible without EMG guidance.    PLAN: I will have him  follow up on a PRN basis. It is anticipated that repeat injections will be required over the long term.

## 2022-12-14 NOTE — NURSING NOTE
Invasive Procedure Safety Checklist  Procedure:  Botox    Responsible person(s):  Complete sections as appropriate and electronically sign and date below.    Staff/Provider  Consent documentation on chart:  YES  H&P is not applicable (when straight local anesthesia is used).    Procedure Team  Completed by comparing informed consent documentation, information on the patient record and/or the marked surgical site, and discussion with the patient/guardian.     Verified:  (Select all that apply)  Patient identification (two indicators)  Procedure to be performed  Procedure site and /or laterality and/or level  Consent  Procedure site:  Site can not be marked due to location.  Provider Kan - Site/Laterality/Level:  No Level or Structure  Staff/Provider:  No images    Procedure Team:  *Pause for the Cause* verbal and active participation of team members- verify:  Patient name:  YES  Procedure to be performed:  YES  Site, laterality and level, noting patient position:  YES    Above steps completed as applicable (Electronic Signature, Title, Date):    Jeanne Tobias RN on 3/1/2022 at 10:21 AM      Note:  Any incidents of wrong patient, wrong procedure, or wrong site are reported using the Occurrence Process already in place.  The occurrence form is required to be completed immediately with this type of event.    
No

## 2023-01-12 ENCOUNTER — E-VISIT (OUTPATIENT)
Dept: PEDIATRICS | Facility: CLINIC | Age: 68
End: 2023-01-12
Payer: COMMERCIAL

## 2023-01-12 DIAGNOSIS — R53.81 MALAISE: ICD-10-CM

## 2023-01-12 DIAGNOSIS — R05.1 ACUTE COUGH: Primary | ICD-10-CM

## 2023-01-12 PROCEDURE — 99421 OL DIG E/M SVC 5-10 MIN: CPT | Mod: CS | Performed by: PHYSICIAN ASSISTANT

## 2023-01-12 NOTE — PATIENT INSTRUCTIONS
Dear Mickey Borden    Your symptoms today are most consistent with a viral syndrome such as influenza or Covid.  I have ordered influenza and covid tests for you, please make a lab only appointment to have tests done.      If your symptoms persist or worsen please be evaluated in clinic.

## 2023-01-13 ENCOUNTER — HOSPITAL ENCOUNTER (EMERGENCY)
Facility: CLINIC | Age: 68
Discharge: HOME | End: 2023-01-13
Attending: EMERGENCY MEDICINE | Admitting: EMERGENCY MEDICINE
Payer: COMMERCIAL

## 2023-01-13 VITALS
RESPIRATION RATE: 22 BRPM | OXYGEN SATURATION: 96 % | DIASTOLIC BLOOD PRESSURE: 100 MMHG | TEMPERATURE: 98.1 F | SYSTOLIC BLOOD PRESSURE: 177 MMHG | BODY MASS INDEX: 28.16 KG/M2 | WEIGHT: 153 LBS | HEART RATE: 64 BPM | HEIGHT: 62 IN

## 2023-01-13 DIAGNOSIS — R05.1 ACUTE COUGH: ICD-10-CM

## 2023-01-13 DIAGNOSIS — J21.0 RSV BRONCHIOLITIS: ICD-10-CM

## 2023-01-13 LAB
FLUAV RNA SPEC QL NAA+PROBE: NEGATIVE
FLUBV RNA RESP QL NAA+PROBE: NEGATIVE
RSV RNA SPEC NAA+PROBE: POSITIVE
SARS-COV-2 RNA RESP QL NAA+PROBE: NEGATIVE

## 2023-01-13 PROCEDURE — 99284 EMERGENCY DEPT VISIT MOD MDM: CPT | Mod: CS | Performed by: EMERGENCY MEDICINE

## 2023-01-13 PROCEDURE — C9803 HOPD COVID-19 SPEC COLLECT: HCPCS | Performed by: EMERGENCY MEDICINE

## 2023-01-13 PROCEDURE — 250N000009 HC RX 250: Performed by: EMERGENCY MEDICINE

## 2023-01-13 PROCEDURE — 87637 SARSCOV2&INF A&B&RSV AMP PRB: CPT | Performed by: EMERGENCY MEDICINE

## 2023-01-13 RX ORDER — OXYMETAZOLINE HYDROCHLORIDE 0.05 G/100ML
2 SPRAY NASAL 2 TIMES DAILY
Refills: 0
Start: 2023-01-13 | End: 2023-01-16

## 2023-01-13 RX ORDER — OXYMETAZOLINE HYDROCHLORIDE 0.05 G/100ML
2 SPRAY NASAL 2 TIMES DAILY
Status: DISCONTINUED | OUTPATIENT
Start: 2023-01-13 | End: 2023-01-13 | Stop reason: HOSPADM

## 2023-01-13 RX ORDER — DEXAMETHASONE SODIUM PHOSPHATE 4 MG/ML
10 VIAL (ML) INJECTION ONCE
Status: COMPLETED | OUTPATIENT
Start: 2023-01-13 | End: 2023-01-13

## 2023-01-13 RX ADMIN — DEXAMETHASONE SODIUM PHOSPHATE 10 MG: 4 INJECTION, SOLUTION INTRAMUSCULAR; INTRAVENOUS at 03:50

## 2023-01-13 RX ADMIN — OXYMETAZOLINE HYDROCHLORIDE 2 SPRAY: 0.05 SPRAY NASAL at 03:50

## 2023-01-13 ASSESSMENT — ENCOUNTER SYMPTOMS
CHILLS: 0
FATIGUE: 0
BACK PAIN: 0
SORE THROAT: 0
CHEST TIGHTNESS: 0
TROUBLE SWALLOWING: 0
VOICE CHANGE: 0
SINUS PAIN: 0
FEVER: 0
APPETITE CHANGE: 0
VOMITING: 0
SHORTNESS OF BREATH: 0
NAUSEA: 0
HEADACHES: 0
COUGH: 1
DIARRHEA: 0
LIGHT-HEADEDNESS: 0
ABDOMINAL PAIN: 0
SINUS PRESSURE: 0
RHINORRHEA: 1

## 2023-01-13 ASSESSMENT — ACTIVITIES OF DAILY LIVING (ADL): ADLS_ACUITY_SCORE: 35

## 2023-01-13 NOTE — DISCHARGE INSTRUCTIONS
Your testing shows positive for RSV.  This is likely the cause of your nasal congestion and runny nose.  This may be the cause of your cough.  If your cough does not improve within a a week when the runny nose and congestion should be improved, we may consider talking with your family doctor about holding your lisinopril as this also may be the cause of your cough.      Consider utilizing saline irrigation of your nose to help relieve nasal congestion.  You can utilize a device such as a Tiffani pot or Nasaline along with saline solution you can purchase at a pharmacy over-the-counter for nasal irrigation.

## 2023-01-13 NOTE — ED TRIAGE NOTES
Patient reports cough started 2 days ago, progressively has gotten worse. Reports being unable to sleep for the last two night due to cough. Has tried cough drops, nyquil and mucinex, none have worked.     Triage Assessment     Row Name 01/13/23 0233       Triage Assessment (Adult)    Airway WDL X    Additional Documentation Breath Sounds (Group)       Respiratory WDL    Respiratory WDL X       Breath Sounds    Breath Sounds All Fields    All Lung Fields Breath Sounds Anterior:;diminished;Posterior:;wheezes, expiratory  slight expiratory wheezing, dry cough       Skin Circulation/Temperature WDL    Skin Circulation/Temperature WDL WDL       Cardiac WDL    Cardiac WDL WDL       Peripheral/Neurovascular WDL    Peripheral Neurovascular WDL WDL       Cognitive/Neuro/Behavioral WDL    Cognitive/Neuro/Behavioral WDL WDL

## 2023-01-13 NOTE — ED PROVIDER NOTES
History     Chief Complaint   Patient presents with     Cough     HPI  Mickey Borden is a 67 year old male with a history of hypertension on diltiazem and lisinopril presenting for evaluation of an uncontrollable cough.  Patient reports dry cough for several days leading to inability to sleep last night or the night before.  He also has some mild nasal congestion and rhinorrhea and he feels he has some postnasal drip.  Denies fever or chills.  Denies body aches or muscle aches.  Has had a slightly decreased appetite but still eating and drinking quite well.  No abdominal pain, nausea, or vomiting.  No rashes.  No known sick contacts.  Denies previous similar cough in the past.  No recent medication changes.  Has tried multiple over-the-counter cough medicines without any relief.    Allergies:  No Known Allergies    Problem List:    Patient Active Problem List    Diagnosis Date Noted     Aneurysm of right internal carotid artery 11/03/2020     Priority: Medium     Mixed type age-related cataract, both eyes 08/10/2020     Priority: Medium     Benign essential hypertension 11/21/2017     Priority: Medium     Hyperlipidemia LDL goal <130 11/21/2017     Priority: Medium     Laryngeal spasm 09/20/2011     Priority: Medium        Past Medical History:    No past medical history on file.    Past Surgical History:    Past Surgical History:   Procedure Laterality Date     LASIK Bilateral 2002       Family History:    Family History   Problem Relation Age of Onset     Hypertension Mother      Melanoma Father      Coronary Artery Disease Sister      Cataracts Brother        Social History:  Marital Status:   [2]  Social History     Tobacco Use     Smoking status: Never     Smokeless tobacco: Never   Substance Use Topics     Alcohol use: Yes     Comment: moderate     Drug use: No        Medications:    oxymetazoline (AFRIN) 0.05 % nasal spray  botulinum toxin type A (BOTOX) 100 units injection  diltiazem ER (DILT-XR)  "180 MG 24 hr capsule  lisinopril (ZESTRIL) 20 MG tablet  Multiple Vitamin (DAILY VITAMIN PO)  Omega-3 Fatty Acids (FISH OIL) 1200 MG CAPS  Saw Palmetto, Serenoa repens, (SAW PALMETTO CONCENTRATE OR)  tamsulosin (FLOMAX) 0.4 MG capsule          Review of Systems   Constitutional: Negative for appetite change, chills, fatigue and fever.   HENT: Positive for congestion (mild), postnasal drip and rhinorrhea (mild). Negative for sinus pressure, sinus pain, sore throat, trouble swallowing and voice change.    Respiratory: Positive for cough. Negative for chest tightness and shortness of breath.    Cardiovascular: Negative for chest pain.   Gastrointestinal: Negative for abdominal pain, diarrhea, nausea and vomiting.   Genitourinary: Negative for decreased urine volume.   Musculoskeletal: Negative for back pain.   Neurological: Negative for light-headedness and headaches.   All other systems reviewed and are negative.      Physical Exam   BP: (!) 177/100  Pulse: 64  Temp: 98.1  F (36.7  C)  Resp: 22  Height: 157.5 cm (5' 2\")  Weight: 69.4 kg (153 lb)  SpO2: 96 %      Physical Exam  Vitals and nursing note reviewed.   Constitutional:       Appearance: Normal appearance. He is not ill-appearing or diaphoretic.   HENT:      Nose: Rhinorrhea (slight) present.      Mouth/Throat:      Mouth: Mucous membranes are moist.      Pharynx: Oropharynx is clear. No oropharyngeal exudate or posterior oropharyngeal erythema.   Eyes:      Conjunctiva/sclera: Conjunctivae normal.   Cardiovascular:      Rate and Rhythm: Normal rate.      Pulses: Normal pulses.      Heart sounds: Normal heart sounds.   Pulmonary:      Effort: Pulmonary effort is normal.      Breath sounds: Normal breath sounds. No wheezing or rhonchi.   Musculoskeletal:         General: Normal range of motion.   Skin:     General: Skin is warm and dry.      Capillary Refill: Capillary refill takes less than 2 seconds.   Neurological:      Mental Status: He is alert and " oriented to person, place, and time.   Psychiatric:         Mood and Affect: Mood normal.         ED Course                 Procedures                Results for orders placed or performed during the hospital encounter of 01/13/23 (from the past 24 hour(s))   Symptomatic Influenza A/B & SARS-CoV2 (COVID-19) Virus PCR Multiplex Nose    Specimen: Nose; Swab   Result Value Ref Range    Influenza A PCR Negative Negative    Influenza B PCR Negative Negative    RSV PCR Positive (A) Negative    SARS CoV2 PCR Negative Negative    Narrative    Testing was performed using the Xpert Xpress CoV2/Flu/RSV Assay on the DataRosepert Instrument. This test should be ordered for the detection of SARS-CoV-2 and influenza viruses in individuals who meet clinical and/or epidemiological criteria. Test performance is unknown in asymptomatic patients. This test is for in vitro diagnostic use under the FDA EUA for laboratories certified under CLIA to perform high or moderate complexity testing. This test has not been FDA cleared or approved. A negative result does not rule out the presence of PCR inhibitors in the specimen or target RNA in concentration below the limit of detection for the assay. If only one viral target is positive but coinfection with multiple targets is suspected, the sample should be re-tested with another FDA cleared, approved, or authorized test, if coinfection would change clinical management. This test was validated by the [facility]. These laboratories are certified under the Clinical Laboratory Improvement Amendments of 1988 (CLIA-88) as qualified to perform high complexity laboratory testing.       Medications   oxymetazoline (AFRIN) 0.05 % spray 2 spray (has no administration in time range)   dexamethasone (DECADRON) injectable solution used ORALLY 10 mg (has no administration in time range)     3:44 AM Patient re-assessed: Patient advised of the positive RSV test and plan for symptomatic  treatment of this.  At this time I suggested he treat the cold first however if this resolves and the cough persists, should consider discontinuing lisinopril as this may be the culprit.      Assessments & Plan (with Medical Decision Making)  67-year-old male with history of hypertension on lisinopril presenting for evaluation of a dry persistent cough over the past 2 days leading to difficulty sleeping.  Cough is present during the daytime but less prominent when he is awake and moving.  At night cough seems to be much more problematic and he has been unable to sleep well due to has had a mild runny nose with some mild postnasal drip but no other infectious symptoms.  He is well-appearing in the ED no distress.  Swabs obtained for influenza and COVID however symptoms suggestive of possible postnasal drip as the cause of his cough and irritation versus ACE inhibitor induced cough.  Trial of symptomatic treatment with oxymetazoline and dexamethasone was given.  RSV testing did come back positive so recommended continued symptomatic treatment for this and if cough does not improve, consider discontinue lisinopril at that point     I have reviewed the nursing notes.    I have reviewed the findings, diagnosis, plan and need for follow up with the patient.        New Prescriptions    OXYMETAZOLINE (AFRIN) 0.05 % NASAL SPRAY    Spray 2 sprays into both nostrils 2 times daily for 3 days       Final diagnoses:   Acute cough   RSV bronchiolitis       1/13/2023   [facility] EMERGENCY DEPT     Lee, Ollie Lai MD  01/13/23 4648

## 2023-01-18 ENCOUNTER — TELEPHONE (OUTPATIENT)
Dept: PEDIATRICS | Facility: CLINIC | Age: 68
End: 2023-01-18

## 2023-01-18 ENCOUNTER — OFFICE VISIT (OUTPATIENT)
Dept: URGENT CARE | Facility: URGENT CARE | Age: 68
End: 2023-01-18
Payer: COMMERCIAL

## 2023-01-18 ENCOUNTER — NURSE TRIAGE (OUTPATIENT)
Dept: PEDIATRICS | Facility: CLINIC | Age: 68
End: 2023-01-18
Payer: COMMERCIAL

## 2023-01-18 VITALS
WEIGHT: 153 LBS | HEART RATE: 73 BPM | OXYGEN SATURATION: 96 % | RESPIRATION RATE: 18 BRPM | DIASTOLIC BLOOD PRESSURE: 87 MMHG | TEMPERATURE: 97 F | SYSTOLIC BLOOD PRESSURE: 152 MMHG | BODY MASS INDEX: 27.98 KG/M2

## 2023-01-18 DIAGNOSIS — J40 BRONCHITIS: Primary | ICD-10-CM

## 2023-01-18 DIAGNOSIS — J20.9 ACUTE BRONCHITIS, UNSPECIFIED ORGANISM: Primary | ICD-10-CM

## 2023-01-18 PROCEDURE — 99213 OFFICE O/P EST LOW 20 MIN: CPT

## 2023-01-18 RX ORDER — BENZONATATE 200 MG/1
200 CAPSULE ORAL 3 TIMES DAILY PRN
Qty: 30 CAPSULE | Refills: 0 | Status: SHIPPED | OUTPATIENT
Start: 2023-01-18 | End: 2023-06-30

## 2023-01-18 RX ORDER — FLUTICASONE PROPIONATE 220 UG/1
1 AEROSOL, METERED RESPIRATORY (INHALATION) 2 TIMES DAILY
Qty: 12 G | Refills: 0 | Status: SHIPPED | OUTPATIENT
Start: 2023-01-18 | End: 2023-01-18

## 2023-01-18 RX ORDER — PREDNISONE 10 MG/1
10 TABLET ORAL DAILY
Qty: 5 TABLET | Refills: 0 | Status: SHIPPED | OUTPATIENT
Start: 2023-01-18 | End: 2023-01-23

## 2023-01-18 NOTE — TELEPHONE ENCOUNTER
Patient calling regarding ongoing cough for 2 weeks.  Sinus issues for the 2 weeks.  Friday 2 am cough severe and hard to breath from the cough.  Went to ER 1/13/23.  + RSV.  Gave one time steroid and nasal spray for 3 days.  States provider noticed he is on lisinopril and if cough did not improve in 3 days or so that he should contact provider to see if lisinopril should be changed.  Cough improved but still there and sinus issue still there.  Hard to sleep at night.  Was not having a cough prior to this illness.  Concerned if he has another infection such as sinus that needs to be treated.  [place] from [place].  Will go to [facility] for evaluation.  Francia Lao RN

## 2023-01-18 NOTE — TELEPHONE ENCOUNTER
Patient was seen in Urgent care. Urgent dose not do PA's.     Prior Authorization Retail Medication Request    Medication/Dose: Fluticasone HFA 220MCG Oral Inhaler QTY:12 SIG: Inhale 1 puff into the lungs twice daily  ICD code (if different than what is on RX):    Previously Tried and Failed:   Rationale:      Insurance Name:  [employer]  Insurance ID:  [id number]      Pharmacy Information (if different than what is on RX)  Name:  [facility]  Phone:  [phone]

## 2023-01-18 NOTE — PATIENT INSTRUCTIONS
"Diagnosis: viral URI_ upper respiratory infection   Today we did:  Rsv infection    Plan:   Fluids: you need to drink lots of fluids: water, electrolytes, broth    Rest: you need lots and lots of rest to get better, you have permission to sleep all day and stay home from work or school until you feel better   Treat the most bothersome symptoms.... see symptoms below.....   Monitor for:   Fever: >101 F that is not relieved w/ tylenol, worsening fevers   Difficulty breathing: shortness of breath, wheezing, chest pain with breathing   Signs of dehydration: Feeling weak, dizzy or that you might faint  Chest pain   You have been fighting this illness for >14 days and are not getting better           Cold and Flu     Unfortunately, <2% of sinus/throat/cough symptoms are caused by a bacteria   However, You are SICK! This is often miserable! And I feel for you!   We cannot make it go away, but lets work to shorten the duration and severity!   Your most bothersome symptom is often where the virus settled in your body:    Nose, throat, or lungs, it may cause cough, sore throat, congestion, runny nose, headache, earache or shortness of breath.   It can also settle in your stomach and cause nausea, vomiting, and diarrhea.   Sometimes it causes generalized symptoms like \"aching all over,\" feeling tired, loss of energy, or loss of appetite.  ------- This illness usually lasts anywhere from 10-14 days ----------- hang in there.         We Treat URIs by helping relieve symptoms     Symptoms: - what is your most bothersome symptom?   Nasal congestion:           Decongestants:                > Pseudoephedrine (Sudafed) 60mg every 4 hours (not before bedtime)      Children >4yrs old: 15mg every 4hours chew (1mg/kg every 6hrs)       Liquid: Children's Silfedrine: 15 mg/5 mL      children >2 years old Phenylephrine (sudafed PE child)             Antihistamines:    > chlorpheniramine 4mg Q4hrs -or- clemastine 1mg twice a day (no more " than 7 days)      Children >2yrs old: Claritin or zyrtec      >> add ibuprofen or tylenol for added effect   cough:          antitussives :: suppresses cough by topical anesthetic action on the respiratory stretch receptor    tessalon perles / Benzonatate - need prescription      >only in children >10yrs of age          Expectorants  ::increase respiratory tract clearance of mucus by adding hydration/viscosity causing thinning and loosening   Guaifenesin AND Dextromethorphan :: [adds cough Suppressant] inhibits cough reflex by decreasing cough receptors (relieves the irritating  dry cough)   Robitussin DM / Mucinex DM   Guaifenesin 200 to 400 mg // dextromethorphan 10 to 20 mg every 4 hours,   Combo tabs: 1-2 tabs every 12hrs         Children 4yr to <6 years: Dextromethorphan 2.5 to 5 mg with Guaifenesin 50 to 100 mg every  4 hours as needed  sore throat:   gargle saltwater, honey, warm fluids          cough drops or lozenges:    Cepacol Lozenge / Benzocaine     Children >5yrs old : one lozenge every 2 hours   Herbal:    - honey = good for cough suppressant    - zinc = 2-3mg lozenge Q2hrs    - menthol vapor rup on chest before sleep

## 2023-01-18 NOTE — TELEPHONE ENCOUNTER
Central Prior Authorization Team  Phone: [phone]    PA Initiation    Medication: fluticasone (FLOVENT HFA) 220 MCG/ACT inhaler  Insurance Company: [employer] - Phone [phone] Fax [phone]  Pharmacy Filling the Rx: [id number] - [place], [place] - [address] [place] [address] AT [facility] & [address]  Filling Pharmacy Phone: [phone]  Filling Pharmacy Fax:    Start Date: 1/18/2023

## 2023-01-18 NOTE — TELEPHONE ENCOUNTER
Please call pt:  His insurance denied authorization for the steroid inhaler.  I sent a prescription for a different steroid inhaler to [facility]. Hopefully this one will be covered.   New rx: Q-Allan (beclomethasone)

## 2023-01-18 NOTE — TELEPHONE ENCOUNTER
PRIOR AUTHORIZATION DENIED    Medication: fluticasone (FLOVENT HFA) 220 MCG/ACT inhaler- DENIED     Denial Date: 1/18/2023    Denial Rational: Patient must have a FDA approved indication for this medication.    Appeal Information: If provider would like to appeal please provide a letter of medical necessity.

## 2023-01-18 NOTE — PROGRESS NOTES
URGENT CARE  Assessment & Plan   Assessment:   Mickey Borden is a 67 year old male who's clinical presentation today is consistent with:   1. Bronchitis  - predniSONE (DELTASONE) 10 MG tablet; Take 1 tablet (10 mg) by mouth daily for 5 days  Dispense: 5 tablet; Refill: 0  - fluticasone (FLOVENT HFA) 220 MCG/ACT inhaler; Inhale 1 puff into the lungs 2 times daily  Dispense: 12 g; Refill: 0  - benzonatate (TESSALON) 200 MG capsule; Take 1 capsule (200 mg) by mouth 3 times daily as needed for cough  Dispense: 30 capsule; Refill: 0    No alarm signs or symptoms present   Differential Diagnoses for this patient's CC include    Bacterial vs viral etiology of URI    Covid, influenza, pharyngitis, pneumonia, bronchitis,    Common cold, allergic rhinitis, seasonal allergies    ABRS, viral sinusitis, cough, pertussis,   Mononucleosis, tonsillitis, chronic sinusitis, meningococcal disease    Plan:  Will treat patient today for bronchitis supportively and symptomatically. Patient's lung sounds were slightly wheezy and cough noted as harsh and productive, but no suspicion for bacterial infectious lung pathology.   Patient's respiratory symptoms appear to be triggered by an acute viral URI so will attempt to alleviate patient's symptoms today w/ oral steroids}, inhalers} and antitussives}; side effects of medications reviewed. Also encouraged patient to continue with OTC cold/flu medications and encouraged fluids and rest.   Additionally we discussed if symptoms do not improve after starting today's treatment (or if symptoms worsen) to follow up in 5-7 days.    Patient  is  agreeable to treatment plan and state they will follow-up if symptoms do not improve and/or if symptoms worsen (see patient's AVS 'monitor for' section for specific patient instructions given and discussed regarding what to watch for and when to follow up)    Medications ordered are listed above, please see AVS for patient's specific and personalized  "discharge instructions given     HERMANN Cisneros [facility] CARE [place]      ______________________________________________________________________        Subjective  Subjective     HPI: Mickey Borden \"Alexander\"} is a 67 year old  male who presents today for evaluation the following concerns:   Patient endorses a cough today which started 7 days ago, patient states he was seen in the ED on 1/13/23 and diagnosed with RSV.    Patient reports he feels like he got a little better but is till having a cough that keeps him up at night. Patient also endorses his ear is poppin and he has sinus congestion.   Patient denies any fevers,  sweats, chills, myalgias, wheezing, shortness of breath, difficulty breathing, chest pain, weakness or signs of dehydration       No Known Allergies  Patient Active Problem List   Diagnosis     Laryngeal spasm     Benign essential hypertension     Hyperlipidemia LDL goal <130     Mixed type age-related cataract, both eyes     Aneurysm of right internal carotid artery       Review of Systems:  Pertinent review of systems as reflected in HPI, otherwise negative.     Objective  Objective    Physical Exam:  Vitals:    01/18/23 1300   BP: (!) 152/87   Pulse: 73   Resp: 18   Temp: 97  F (36.1  C)   TempSrc: Tympanic   SpO2: 96%   Weight: 69.4 kg (153 lb)      General: Alert and oriented, no acute distress, Vital signs reviewed: afebrile,  normotensive   Psy/mental status: Cooperative, nonanxious  SKIN: Intact, no rashes  EYES: EOMs intact, PERRLA bilaterally   Conjunctiva: Clear bilaterally, no injection or erythema present  EARS: TMs intact, translucent gray in color with normal landmarks present no erythema  or bulging tympanic membrane   Canals are without swelling, however have a mild amount of cerumen, no impaction  NOSE:  mucosa erythematous bilaterally with a mild amount of rhinorrhea, clear  discharge}               No frontal or maxillary sinus tenderness present " bilaterally  MOUTH/THROAT: lips, tongue, & oral mucosa appear normal upon inspection                Posterior oropharynx is erythematous but without exudate, lesions or tonsillar  Edema, no dysphonia   NECK: supple, has full range of motion with no meningeal signs              No lymphadenopathy present  LUNG: normal work of breathing, good respiratory effort without retractions, good air  movement, non labored, inspection reveals normal chest expansion w/  inspiration            Lung sounds have slight wheezes  to auscultation bilaterally,            No rales/rhonic/crackles wheezing noted           Infrequent cough noted as harsh, irritative but no productive             I explained my diagnostic considerations and recommendations to the patient, who voiced understanding and agreement with the treatment plan.   All questions were answered.   We discussed potential side effects, risks and benefits of any prescribed or recommended therapies, as well as expectations for response to treatments.  Please see AVS for any patient instructions & handouts given.   Patient was advised to contact the Nurse Care Line, their Primary Care provider, Urgent Care, or the Emergency Department if there are new or worsening symptoms, or call 911 for emergencies.        ______________________________________________________________________          Patient Instructions   Diagnosis: viral URI_ upper respiratory infection   Today we did:  Rsv infection    Plan:     Fluids: you need to drink lots of fluids: water, electrolytes, broth      Rest: you need lots and lots of rest to get better, you have permission to sleep all day and stay home from work or school until you feel better     Treat the most bothersome symptoms.... see symptoms below.....   Monitor for:     Fever: >101 F that is not relieved w/ tylenol, worsening fevers     Difficulty breathing: shortness of breath, wheezing, chest pain with breathing     Signs of dehydration:  "Feeling weak, dizzy or that you might faint    Chest pain     You have been fighting this illness for >14 days and are not getting better           Cold and Flu     Unfortunately, <2% of sinus/throat/cough symptoms are caused by a bacteria   However, You are SICK! This is often miserable! And I feel for you!   We cannot make it go away, but lets work to shorten the duration and severity!   Your most bothersome symptom is often where the virus settled in your body:    Nose, throat, or lungs, it may cause cough, sore throat, congestion, runny nose, headache, earache or shortness of breath.   It can also settle in your stomach and cause nausea, vomiting, and diarrhea.   Sometimes it causes generalized symptoms like \"aching all over,\" feeling tired, loss of energy, or loss of appetite.  ------- This illness usually lasts anywhere from 10-14 days ----------- hang in there.         We Treat URIs by helping relieve symptoms     Symptoms: - what is your most bothersome symptom?   Nasal congestion:           Decongestants:                > Pseudoephedrine (Sudafed) 60mg every 4 hours (not before bedtime)      Children >4yrs old: 15mg every 4hours chew (1mg/kg every 6hrs)       Liquid: Children's Silfedrine: 15 mg/5 mL      children >2 years old Phenylephrine (sudafed PE child)             Antihistamines:    > chlorpheniramine 4mg Q4hrs -or- clemastine 1mg twice a day (no more than 7 days)      Children >2yrs old: Claritin or zyrtec      >> add ibuprofen or tylenol for added effect   cough:          antitussives :: suppresses cough by topical anesthetic action on the respiratory stretch receptor    tessalon perles / Benzonatate - need prescription      >only in children >10yrs of age          Expectorants  ::increase respiratory tract clearance of mucus by adding hydration/viscosity causing thinning and loosening   Guaifenesin AND Dextromethorphan :: [adds cough Suppressant] inhibits cough reflex by decreasing cough receptors " (relieves the irritating  dry cough)   Robitussin DM / Mucinex DM   Guaifenesin 200 to 400 mg // dextromethorphan 10 to 20 mg every 4 hours,   Combo tabs: 1-2 tabs every 12hrs         Children 4yr to <6 years: Dextromethorphan 2.5 to 5 mg with Guaifenesin 50 to 100 mg every  4 hours as needed  sore throat:   gargle saltwater, honey, warm fluids          cough drops or lozenges:    Cepacol Lozenge / Benzocaine     Children >5yrs old : one lozenge every 2 hours   Herbal:    - honey = good for cough suppressant    - zinc = 2-3mg lozenge Q2hrs    - menthol vapor rup on chest before sleep

## 2023-01-24 ENCOUNTER — OFFICE VISIT (OUTPATIENT)
Dept: OTOLARYNGOLOGY | Facility: CLINIC | Age: 68
End: 2023-01-24
Payer: COMMERCIAL

## 2023-01-24 VITALS — WEIGHT: 155.2 LBS | TEMPERATURE: 98.2 F | BODY MASS INDEX: 28.39 KG/M2

## 2023-01-24 DIAGNOSIS — J38.5 LARYNGEAL SPASM: Primary | ICD-10-CM

## 2023-01-24 DIAGNOSIS — J38.3 OTHER DISEASES OF VOCAL CORDS: ICD-10-CM

## 2023-01-24 PROCEDURE — 99207 PR NO CHARGE LOS: CPT | Performed by: OTOLARYNGOLOGY

## 2023-01-24 PROCEDURE — 64617 CHEMODENER MUSCLE LARYNX EMG: CPT | Mod: 50 | Performed by: OTOLARYNGOLOGY

## 2023-01-24 ASSESSMENT — PAIN SCALES - GENERAL: PAINLEVEL: NO PAIN (0)

## 2023-01-24 NOTE — PATIENT INSTRUCTIONS
1.  You were seen in the ENT Clinic today by . If you have any questions or concerns after your appointment, please call [phone]. Press option #1 for scheduling related needs. Press option #3 for Nurse advice.    2.  Plan is to return to clinic 3/28/23 for repeat botox injection      My Adorno LPN  [phone]  [facility] - Otolaryngology

## 2023-01-24 NOTE — LETTER
1/24/2023       RE: Mickey Borden  [id number]  [facility] [address]     Dear Colleague,    Thank you for referring your patient, Mickey Borden, to the [facility] EAR NOSE AND THROAT CLINIC [place] at [facility]. Please see a copy of my visit note below.    Mikcey Borden is a 67 year old male with a history of adductor laryngeal dystonia and laryngeal spasm.  he was last injected on 11/22/2022. he had a good response to the last injection. The last dose given was 0.25 units toxin diluted at a concentration of 6.25 units per ml, delivering 0.04 ml units into each thyroarytenoid muscle.  After the injection  he had a breathy dysphonia for 1 week.There was no Dysphagia or Dyspnea.  The response lasted for 1.5 months.  He did have a cough for what he was seen in the emergency room 1/13/2023 which showed to be an RSV related bronchitis.  He is slowly improving from that.  Our plan is to give  0.25 units toxin diluted at a concentration of 6.25 units per ml, delivering 0.04 ml units into each thyroarytenoid muscle today.      PROCEDURE: After obtaining consent, the patient was placed in the supine position. Ground and reference electrodes were applied. The anterior neck was cleaned with an alcohol swab.  Using a 27-gauge, unipolar electromyography needle, the larynx was entered through the cricothyroid space.  0.25 units of botulinum A toxin was injected into each thyroarytenoid muscle.  There was a Strong EMG response to phonation on the left side and a Moderate EMG response to phonation on the right side.  The total amount of botulinum A toxin delivered today was 0.5 units. An additional 5 units of botulinum A toxin was necessarily wasted in preparation for the injection. he tolerated the procedure well and left the clinic after a short observation period.         The EMG was necessary specifically in this case to identify areas of muscle  overactivity as well as to access the thyroarytenoid muscle which is beneath the thyroid cartilage and is not otherwise directly accessible without EMG guidance.    PLAN: I will have him  follow up on a PRN basis. It is anticipated that repeat injections will be required over the long term.        Again, thank you for allowing me to participate in the care of your patient.      Sincerely,    Tae Fritz MD

## 2023-01-24 NOTE — PROGRESS NOTES
Mickey Borden is a 67 year old male with a history of adductor laryngeal dystonia and laryngeal spasm.  he was last injected on 11/22/2022. he had a good response to the last injection. The last dose given was 0.25 units toxin diluted at a concentration of 6.25 units per ml, delivering 0.04 ml units into each thyroarytenoid muscle.  After the injection  he had a breathy dysphonia for 1 week.There was no Dysphagia or Dyspnea.  The response lasted for 1.5 months.  He did have a cough for what he was seen in the emergency room 1/13/2023 which showed to be an RSV related bronchitis.  He is slowly improving from that.  Our plan is to give  0.25 units toxin diluted at a concentration of 6.25 units per ml, delivering 0.04 ml units into each thyroarytenoid muscle today.      PROCEDURE: After obtaining consent, the patient was placed in the supine position. Ground and reference electrodes were applied. The anterior neck was cleaned with an alcohol swab.  Using a 27-gauge, unipolar electromyography needle, the larynx was entered through the cricothyroid space.  0.25 units of botulinum A toxin was injected into each thyroarytenoid muscle.  There was a Strong EMG response to phonation on the left side and a Moderate EMG response to phonation on the right side.  The total amount of botulinum A toxin delivered today was 0.5 units. An additional 5 units of botulinum A toxin was necessarily wasted in preparation for the injection. he tolerated the procedure well and left the clinic after a short observation period.         The EMG was necessary specifically in this case to identify areas of muscle overactivity as well as to access the thyroarytenoid muscle which is beneath the thyroid cartilage and is not otherwise directly accessible without EMG guidance.    PLAN: I will have him  follow up on a PRN basis. It is anticipated that repeat injections will be required over the long term.

## 2023-03-27 ENCOUNTER — TELEPHONE (OUTPATIENT)
Dept: OTOLARYNGOLOGY | Facility: CLINIC | Age: 68
End: 2023-03-27
Payer: COMMERCIAL

## 2023-03-28 ENCOUNTER — OFFICE VISIT (OUTPATIENT)
Dept: OTOLARYNGOLOGY | Facility: CLINIC | Age: 68
End: 2023-03-28
Payer: COMMERCIAL

## 2023-03-28 VITALS — HEIGHT: 62 IN | WEIGHT: 155 LBS | BODY MASS INDEX: 28.52 KG/M2

## 2023-03-28 DIAGNOSIS — J38.3 OTHER DISEASES OF VOCAL CORDS: ICD-10-CM

## 2023-03-28 DIAGNOSIS — J38.5 LARYNGEAL SPASM: Primary | ICD-10-CM

## 2023-03-28 PROCEDURE — 64617 CHEMODENER MUSCLE LARYNX EMG: CPT | Mod: 50 | Performed by: OTOLARYNGOLOGY

## 2023-03-28 ASSESSMENT — PAIN SCALES - GENERAL: PAINLEVEL: NO PAIN (0)

## 2023-03-28 NOTE — PATIENT INSTRUCTIONS
1.  You were seen in the ENT Clinic today by . If you have any questions or concerns after your appointment, please call [phone]. Press option #1 for scheduling related needs. Press option #3 for Nurse advice.    2.   Plan is to return to clinic 5/30/23 for repeat botox injection      My Adorno LPN  [phone]  [facility] - Otolaryngology

## 2023-03-28 NOTE — LETTER
3/28/2023       RE: Mickey Borden  [id number]  [place] [facility] [place] [facility] [address]     Dear Colleague,    Thank you for referring your patient, Mickey Borden, to the [facility] EAR NOSE AND THROAT CLINIC [place] at [facility]. Please see a copy of my visit note below.    Mickey Borden is a 67 year old male with a history of adductor laryngeal dystonia and laryngeal spasm.  he was last injected on 1/24/2023. he had a good response to the last injection. The last dose given was  0.25 units toxin diluted at a concentration of 6.25 units per ml, delivering 0.04 ml  units into each thyroarytenoid muscle.  After the injection  he had a breathy dysphonia for 0 weeks.There was no Dysphagia or Dyspnea.  The response lasted for 2.5 months.   Our plan is to give 0.25 units toxin diluted at a concentration of 6.25 units per ml, delivering 0.04 ml units of Botulinum A toxin into  each thyroarytenoid muscle today.      PROCEDURE: After obtaining consent, the patient was placed in the supine position. Ground and reference electrodes were applied. The anterior neck was cleaned with an alcohol swab.  Using a 27-gauge, unipolar electromyography needle, the larynx was entered through the cricothyroid space.  0.25 units of botulinum A toxin was injected into each thyroarytenoid muscle.  There was a Strong EMG response to phonation on the left side and a Strong EMG response to phonation on the right side.  The total amount of botulinum A toxin delivered today was 0.5 units. An additional 5 units of botulinum A toxin was necessarily wasted in preparation for the injection. he tolerated the procedure well and left the clinic after a short observation period.         The EMG was necessary specifically in this case to identify areas of muscle overactivity as well as to access the thyroarytenoid muscle which is beneath the thyroid cartilage and is not otherwise directly accessible  without EMG guidance.    PLAN: I will have him  follow up on a PRN basis. It is anticipated that repeat injections will be required over the long term.      Again, thank you for allowing me to participate in the care of your patient.      Sincerely,    Tae Fritz MD

## 2023-03-28 NOTE — PROGRESS NOTES
Mickey Borden is a 67 year old male with a history of adductor laryngeal dystonia and laryngeal spasm.  he was last injected on 1/24/2023. he had a good response to the last injection. The last dose given was  0.25 units toxin diluted at a concentration of 6.25 units per ml, delivering 0.04 ml  units into each thyroarytenoid muscle.  After the injection  he had a breathy dysphonia for 0 weeks.There was no Dysphagia or Dyspnea.  The response lasted for 2.5 months.   Our plan is to give 0.25 units toxin diluted at a concentration of 6.25 units per ml, delivering 0.04 ml units of Botulinum A toxin into  each thyroarytenoid muscle today.      PROCEDURE: After obtaining consent, the patient was placed in the supine position. Ground and reference electrodes were applied. The anterior neck was cleaned with an alcohol swab.  Using a 27-gauge, unipolar electromyography needle, the larynx was entered through the cricothyroid space.  0.25 units of botulinum A toxin was injected into each thyroarytenoid muscle.  There was a Strong EMG response to phonation on the left side and a Strong EMG response to phonation on the right side.  The total amount of botulinum A toxin delivered today was 0.5 units. An additional 5 units of botulinum A toxin was necessarily wasted in preparation for the injection. he tolerated the procedure well and left the clinic after a short observation period.         The EMG was necessary specifically in this case to identify areas of muscle overactivity as well as to access the thyroarytenoid muscle which is beneath the thyroid cartilage and is not otherwise directly accessible without EMG guidance.    PLAN: I will have him  follow up on a PRN basis. It is anticipated that repeat injections will be required over the long term.

## 2023-03-30 DIAGNOSIS — J38.5 LARYNGEAL SPASM: Primary | ICD-10-CM

## 2023-04-10 ENCOUNTER — TRANSFERRED RECORDS (OUTPATIENT)
Dept: HEALTH INFORMATION MANAGEMENT | Facility: CLINIC | Age: 68
End: 2023-04-10
Payer: COMMERCIAL

## 2023-04-18 ENCOUNTER — OFFICE VISIT (OUTPATIENT)
Dept: OTOLARYNGOLOGY | Facility: CLINIC | Age: 68
End: 2023-04-18
Payer: COMMERCIAL

## 2023-04-18 VITALS — BODY MASS INDEX: 28.52 KG/M2 | WEIGHT: 155 LBS | HEIGHT: 62 IN

## 2023-04-18 DIAGNOSIS — J38.5 LARYNGEAL SPASM: Primary | ICD-10-CM

## 2023-04-18 DIAGNOSIS — J38.3 OTHER DISEASES OF VOCAL CORDS: ICD-10-CM

## 2023-04-18 PROCEDURE — 64617 CHEMODENER MUSCLE LARYNX EMG: CPT | Mod: 50 | Performed by: OTOLARYNGOLOGY

## 2023-04-18 ASSESSMENT — PAIN SCALES - GENERAL: PAINLEVEL: NO PAIN (0)

## 2023-04-18 NOTE — PATIENT INSTRUCTIONS
1.  You were seen in the ENT Clinic today by . If you have any questions or concerns after your appointment, please call [phone]. Press option #1 for scheduling related needs. Press option #3 for Nurse advice.    2. Plan is to return to clinic 5/30/23 for repeat botox injection      My Adorno LPN  [phone]  [facility] - Otolaryngology

## 2023-04-18 NOTE — LETTER
4/18/2023       RE: Mickey Borden  [id number]  [place] [address]     Dear Colleague,    Thank you for referring your patient, Mickey Borden, to the [facility] EAR NOSE AND THROAT CLINIC [place] at [facility]. Please see a copy of my visit note below.    Mickey Borden is a 67 year old male with a history of adductor laryngeal dystonia and laryngeal spasm.  he was last injected on 3/28/2023. he had a poor response to the last injection. The last dose given was  0.25 units toxin diluted at a concentration of 6.25 units per ml, delivering 0.04 ml  units into each thyroarytenoid muscle.  After the injection  he had a breathy dysphonia for 0 weeks.There was no Dysphagia or Dyspnea.  The response lasted for 0.5 months.   Our plan is to give  0.15 units toxin diluted at a concentration of 6.25 units per ml, delivering 0.02 ml  units of Botulinum A toxin into  each thyroarytenoid muscle today.      PROCEDURE: After obtaining consent, the patient was placed in the supine position. Ground and reference electrodes were applied. The anterior neck was cleaned with an alcohol swab.  Using a 27-gauge, unipolar electromyography needle, the larynx was entered through the cricothyroid space.  0.15 units of botulinum A toxin was injected into each thyroarytenoid muscle.  There was a Moderate EMG response to phonation on the left side and a Moderate EMG response to phonation on the right side.  The total amount of botulinum A toxin delivered today was 0.3 units. An additional 5 units of botulinum A toxin was necessarily wasted in preparation for the injection. he tolerated the procedure well and left the clinic after a short observation period.         The EMG was necessary specifically in this case to identify areas of muscle overactivity as well as to access the thyroarytenoid muscle which is beneath the thyroid cartilage and is not otherwise directly  accessible without EMG guidance.    PLAN: I will have him  follow up on a PRN basis. It is anticipated that repeat injections will be required over the long term.            Again, thank you for allowing me to participate in the care of your patient.      Sincerely,    Tae Fritz MD

## 2023-04-18 NOTE — PROGRESS NOTES
Mickey Borden is a 67 year old male with a history of adductor laryngeal dystonia and laryngeal spasm.  he was last injected on 3/28/2023. he had a poor response to the last injection. The last dose given was  0.25 units toxin diluted at a concentration of 6.25 units per ml, delivering 0.04 ml  units into each thyroarytenoid muscle.  After the injection  he had a breathy dysphonia for 0 weeks.There was no Dysphagia or Dyspnea.  The response lasted for 0.5 months.   Our plan is to give  0.15 units toxin diluted at a concentration of 6.25 units per ml, delivering 0.02 ml  units of Botulinum A toxin into  each thyroarytenoid muscle today.      PROCEDURE: After obtaining consent, the patient was placed in the supine position. Ground and reference electrodes were applied. The anterior neck was cleaned with an alcohol swab.  Using a 27-gauge, unipolar electromyography needle, the larynx was entered through the cricothyroid space.  0.15 units of botulinum A toxin was injected into each thyroarytenoid muscle.  There was a Moderate EMG response to phonation on the left side and a Moderate EMG response to phonation on the right side.  The total amount of botulinum A toxin delivered today was 0.3 units. An additional 5 units of botulinum A toxin was necessarily wasted in preparation for the injection. he tolerated the procedure well and left the clinic after a short observation period.         The EMG was necessary specifically in this case to identify areas of muscle overactivity as well as to access the thyroarytenoid muscle which is beneath the thyroid cartilage and is not otherwise directly accessible without EMG guidance.    PLAN: I will have him  follow up on a PRN basis. It is anticipated that repeat injections will be required over the long term.        
Patient/Caregiver provided printed discharge information.

## 2023-05-30 ENCOUNTER — OFFICE VISIT (OUTPATIENT)
Dept: OTOLARYNGOLOGY | Facility: CLINIC | Age: 68
End: 2023-05-30
Payer: COMMERCIAL

## 2023-05-30 VITALS — WEIGHT: 152 LBS | HEIGHT: 63 IN | BODY MASS INDEX: 26.93 KG/M2

## 2023-05-30 DIAGNOSIS — J38.3 OTHER DISEASES OF VOCAL CORDS: ICD-10-CM

## 2023-05-30 DIAGNOSIS — J38.5 LARYNGEAL SPASM: Primary | ICD-10-CM

## 2023-05-30 PROCEDURE — 64617 CHEMODENER MUSCLE LARYNX EMG: CPT | Mod: 50 | Performed by: OTOLARYNGOLOGY

## 2023-05-30 ASSESSMENT — PAIN SCALES - GENERAL: PAINLEVEL: NO PAIN (0)

## 2023-05-30 NOTE — PROGRESS NOTES
Mickey Borden is a 67 year old male with a history of adductor laryngeal dystonia and laryngeal spasm.  he was last injected on 4/18/2023. he had a good response to the last injection. The last dose given was  0.15 units toxin diluted at a concentration of 6.25 units per ml, delivering 0.02 ml   units into each thyroarytenoid muscle.  This was a reduction from 0.25 units of botulinum a toxin diluted to a concentration of 6.25 units/mL, delivering 0.4 ml bilaterally.  After the injection  he had a breathy dysphonia for 0 weeks.There was no Dysphagia or Dyspnea.  The response lasted for 1.5 months and is still having some effect..   Our plan is to give 0.15 units of Botulinum A toxin into  each thyroarytenoid muscle today.      PROCEDURE: After obtaining consent, the patient was placed in the supine position. Ground and reference electrodes were applied. The anterior neck was cleaned with an alcohol swab.  Using a 27-gauge, unipolar electromyography needle, the larynx was entered through the cricothyroid space. 0.1.5 units of botulinum A toxin was injected into each thyroarytenoid muscle.  There was a Strong EMG response to phonation on the left side and a Weak EMG response to phonation on the right side.  The total amount of botulinum A toxin delivered today was 0.3 units. An additional 5 units of botulinum A toxin was necessarily wasted in preparation for the injection. he tolerated the procedure well and left the clinic after a short observation period.         The EMG was necessary specifically in this case to identify areas of muscle overactivity as well as to access the thyroarytenoid muscle which is beneath the thyroid cartilage and is not otherwise directly accessible without EMG guidance.    PLAN: I will have him  follow up on a PRN basis. It is anticipated that repeat injections will be required over the long term.

## 2023-05-30 NOTE — LETTER
5/30/2023       RE: Mickey Borden  [id number]  [facility] [address]     Dear Colleague,    Thank you for referring your patient, Mickey Borden, to the [facility] E[facility] NOSE [facility] THROAT [facility]INIC [place] at [facility]. Please see a copy of my visit note below.    Mickey Borden is a 67 year old male with a history of adductor laryngeal dystonia and laryngeal spasm.  he was last injected on 4/18/2023. he had a good response to the last injection. The last dose given was  0.15 units toxin diluted at a concentration of 6.25 units per ml, delivering 0.02 ml   units into each thyroarytenoid muscle.  This was a reduction from 0.25 units of botulinum a toxin diluted to a concentration of 6.25 units/mL, delivering 0.4 ml bilaterally.  After the injection  he had a breathy dysphonia for 0 weeks.There was no Dysphagia or Dyspnea.  The response lasted for 0.15 months and is still having some effect..   Our plan is to give 0.15 units of Botulinum A toxin into  each thyroarytenoid muscle today.      PROCEDURE: After obtaining consent, the patient was placed in the supine position. Ground and reference electrodes were applied. The anterior neck was cleaned with an alcohol swab.  Using a 27-gauge, unipolar electromyography needle, the larynx was entered through the cricothyroid space. 0.1.5 units of botulinum A toxin was injected into each thyroarytenoid muscle.  There was a Strong EMG response to phonation on the left side and a Weak EMG response to phonation on the right side.  The total amount of botulinum A toxin delivered today was 0.3 units. An additional 5 units of botulinum A toxin was necessarily wasted in preparation for the injection. he tolerated the procedure well and left the clinic after a short observation period.         The EMG was necessary specifically in this case to identify areas of muscle overactivity as well as to access the thyroarytenoid  muscle which is beneath the thyroid cartilage and is not otherwise directly accessible without EMG guidance.    PLAN: I will have him  follow up on a PRN basis. It is anticipated that repeat injections will be required over the long term.          Again, thank you for allowing me to participate in the care of your patient.      Sincerely,    Tae Fritz MD

## 2023-05-30 NOTE — PATIENT INSTRUCTIONS
1.  You were seen in the ENT Clinic today by . If you have any questions or concerns after your appointment, please call [phone]. Press option #1 for scheduling related needs. Press option #3 for Nurse advice.    2.   Plan is to return to clinic 8/1/23 for repeat botox injection      My Adorno LPN  [phone]  [facility] - Otolaryngology

## 2023-06-07 ENCOUNTER — TELEPHONE (OUTPATIENT)
Dept: PEDIATRICS | Facility: CLINIC | Age: 68
End: 2023-06-07

## 2023-06-07 DIAGNOSIS — M25.512 ACUTE PAIN OF LEFT SHOULDER: Primary | ICD-10-CM

## 2023-06-07 NOTE — TELEPHONE ENCOUNTER
Called and spoke with patient. Notified him that PCP placed referral. He mentioned he checked with his insurance and was told a referral was not necessary so he already made an appointment on his own with ortho.     Gianna Gao MA 1:11 PM 6/7/2023

## 2023-06-07 NOTE — TELEPHONE ENCOUNTER
OK for a referral:  [facility]   [address] [place] [address]   Phone: [phone]     He can call them to schedule a visit.

## 2023-06-07 NOTE — TELEPHONE ENCOUNTER
Pt transferred to  triage - rotator cuff injury a few weeks ago left shoulder.   [place]   [place] has been doing ice/heat, taking advil, topical cream, not overusing arm/shoulder  Not golfing since injury   Night the pain is worse     Pt is wondering if he can just have an ORTHO referral instead of being seen today.     Leah LUCAS RN       Next 5 appointments (look out 90 days)    Jun 07, 2023  2:00 PM  (Arrive by 1:40 PM)  Provider Visit with Mirna Mills MD  [facility] ([facility] ) [address]  [address]  [place] [address] [phone]          Adv pt to call insurance and see if referral is required. If not, cancel appt, and be seen in ortho uc.     Leah LUCAS RN

## 2023-06-29 ENCOUNTER — PATIENT OUTREACH (OUTPATIENT)
Dept: CARE COORDINATION | Facility: CLINIC | Age: 68
End: 2023-06-29
Payer: COMMERCIAL

## 2023-06-30 ENCOUNTER — OFFICE VISIT (OUTPATIENT)
Dept: UROLOGY | Facility: CLINIC | Age: 68
End: 2023-06-30
Payer: COMMERCIAL

## 2023-06-30 VITALS
OXYGEN SATURATION: 98 % | DIASTOLIC BLOOD PRESSURE: 81 MMHG | BODY MASS INDEX: 27.14 KG/M2 | HEIGHT: 63 IN | HEART RATE: 64 BPM | WEIGHT: 153.2 LBS | SYSTOLIC BLOOD PRESSURE: 144 MMHG | TEMPERATURE: 97.1 F

## 2023-06-30 DIAGNOSIS — R35.1 BENIGN PROSTATIC HYPERPLASIA WITH NOCTURIA: Primary | ICD-10-CM

## 2023-06-30 DIAGNOSIS — N40.1 BENIGN PROSTATIC HYPERPLASIA WITH NOCTURIA: Primary | ICD-10-CM

## 2023-06-30 PROCEDURE — 99203 OFFICE O/P NEW LOW 30 MIN: CPT | Mod: 25 | Performed by: STUDENT IN AN ORGANIZED HEALTH CARE EDUCATION/TRAINING PROGRAM

## 2023-06-30 PROCEDURE — 51798 US URINE CAPACITY MEASURE: CPT | Performed by: STUDENT IN AN ORGANIZED HEALTH CARE EDUCATION/TRAINING PROGRAM

## 2023-06-30 RX ORDER — SOLIFENACIN SUCCINATE 5 MG/1
5 TABLET, FILM COATED ORAL DAILY
Qty: 90 TABLET | Refills: 1 | Status: CANCELLED | OUTPATIENT
Start: 2023-06-30

## 2023-06-30 ASSESSMENT — PAIN SCALES - GENERAL: PAINLEVEL: NO PAIN (0)

## 2023-06-30 NOTE — PROGRESS NOTES
Active order to obtain bladder scan? Yes   Name of ordering provider:  Yenifer Olivares  Bladder scan preformed post void Yes:   Bladder scan reveled 23 ML  Provider notified?  Yes    Belia Martinez LPN

## 2023-06-30 NOTE — PROGRESS NOTES
"        Chief Complaint:   Nocturia         History of Present Illness:   Mickey Borden is a 67 year old male with a history of HLD and HTN who presents for evaluation of nocturia.     The patient reports a several month history of nocturia x 3-5 despite tamsulosin.     He started tamsulosin through his PCP. He noted improvement in his nocturia and stronger stream. His dose was increased to 0.8 mg, which he felt provided some additional improvement.     He denies dysuria and gross hematuria.     His last PSA was 0.67 ng/mL on 12/23/2020.          Past Medical History:   No past medical history on file.         Past Surgical History:     Past Surgical History:   Procedure Laterality Date     LASIK Bilateral 2002            Medications     Current Outpatient Medications   Medication     diltiazem ER (DILT-XR) 180 MG 24 hr capsule     lisinopril (ZESTRIL) 20 MG tablet     Multiple Vitamin (DAILY VITAMIN PO)     Omega-3 Fatty Acids (FISH OIL) 1200 MG CAPS     Saw Palmetto, Serenoa repens, (SAW PALMETTO CONCENTRATE OR)     tamsulosin (FLOMAX) 0.4 MG capsule     botulinum toxin type A (BOTOX) 100 units injection     Current Facility-Administered Medications   Medication     botulinum toxin type A (BOTOX) 100 units injection 0.5 Units     botulinum toxin type A (BOTOX) 100 units injection 0.5 Units     botulinum toxin type A (BOTOX) 100 units injection 0.5 Units            Allergies:   Patient has no known allergies.         Review of Systems:  From intake questionnaire   Negative 14 system review except as noted on HPI, nurse's note.         Physical Exam:   Patient is a 67 year old  male   Vitals: Blood pressure (!) 144/81, pulse 64, temperature 97.1  F (36.2  C), temperature source Tympanic, height 1.6 m (5' 3\"), weight 69.5 kg (153 lb 3.2 oz), SpO2 98 %.  General Appearance Adult: Alert, no acute distress, oriented.  Lungs: Non-labored breathing.  Heart: No obvious jugular venous distension present.  Neuro: Alert, " oriented, speech and mentation normal    PVR: 23 mL      Labs and Pathology:    I personally reviewed all applicable laboratory data and went over findings with patient  Significant for:    CBC RESULTS:  Recent Labs   Lab Test 12/17/18  1046   WBC 6.0   HGB 14.7   *        BMP RESULTS:  Recent Labs   Lab Test 12/23/20  0800 08/20/18  0757 05/23/17  0000    141  --    POTASSIUM 4.3 4.0 5.2*   CHLORIDE 111* 108  --    CO2 28 27  --    ANIONGAP 2* 6  --    GLC 89 97 85   BUN 19 14  --    CR 1.17 1.06 1.11   GFRESTIMATED 65 70 >60   GFRESTBLACK 75 85 >60   SNOW 8.9 8.5  --        PSA RESULTS  PSA   Date Value Ref Range Status   12/23/2020 0.67 0 - 4 ug/L Final     Comment:     Assay Method:  Chemiluminescence using Siemens Vista analyzer   08/20/2018 0.54 0 - 4 ug/L Final     Comment:     Assay Method:  Chemiluminescence using Siemens Vista analyzer     Prostate Specific Antigen Screen   Date Value Ref Range Status   05/23/2017 0.4 0.0 - 4.5 ng/mL Final   07/17/2014 0.6 0.0 - 3.5 ng/mL Final              Assessment and Plan:     Assessment: 67 year old male seen in evaluation for nocturia x 3-5. He takes tamsulosin 0.8 mg daily, which has been helpful for his symptoms.     We discussed further treatment options for BPH, in particular finasteride and bladder outlet surgeries. I suspect an anticholinergic would be most helpful for reducing nocturia.     We reviewed bladder irritants and conservative measures for reducing nocturia including reducing fluid intake 3-4 hours before bed, elevating the lower extremities for one hour before bed, and avoiding salty snacks in the evening. The patient would prefer to try these measures before adding an additional medication.     Plan:  1. Continue tamsulosin 0.8 mg daily.   2. Annual PSA screening through PCP per patient preference.   3. Follow up with urology as needed.     TINA LACY PA-C  Department of Urology

## 2023-06-30 NOTE — PATIENT INSTRUCTIONS
BLADDER IRRITANTS    Below is a list of foods that can irritate the bladder.  I recommend that you limit or avoid.    Caffeinated soft drinks  Coffee.  Tea.  Chocolate.  Acidic juices and fruits such as cranberry, lemon, orange, pineapple and other citrus as well as tart apple or cherry   Alcoholic beverages  Carbonated drinks, especially missy  Aspartame/Nutrasweet.  Spicy foods  Tomato products    Substitutions that you can make in your daily diet:  - Herbals teas that don't contain caffeine or a large amount of citrus  - Ovaltine instead of chocolate drinks  - Less acidic fruits such as blueberry, banana, sweet (rather than tart) apples, pears, papaya.      Start by eliminating all these foods for 2 weeks.  Assess for progress.  If you are feeling better, slowly add foods back - 1 new food every 3-5 days and assess for worsening.  This way you can pinpoint exactly which foods worsen your bladder symptoms.       Reduce fluid intake 3-4 hours before bed, elevate the lower extremities for one hour before bed, and avoid salty snacks in the evening.

## 2023-06-30 NOTE — NURSING NOTE
"Initial BP (!) 144/81   Pulse 64   Temp 97.1  F (36.2  C) (Tympanic)   Ht 1.6 m (5' 3\")   Wt 69.5 kg (153 lb 3.2 oz)   SpO2 98%   BMI 27.14 kg/m   Estimated body mass index is 27.14 kg/m  as calculated from the following:    Height as of this encounter: 1.6 m (5' 3\").    Weight as of this encounter: 69.5 kg (153 lb 3.2 oz). .    Belia Martinez LPN on 6/30/2023 at 10:46 AM    "

## 2023-07-13 ENCOUNTER — PATIENT OUTREACH (OUTPATIENT)
Dept: CARE COORDINATION | Facility: CLINIC | Age: 68
End: 2023-07-13
Payer: COMMERCIAL

## 2023-08-01 ENCOUNTER — OFFICE VISIT (OUTPATIENT)
Dept: OTOLARYNGOLOGY | Facility: CLINIC | Age: 68
End: 2023-08-01
Payer: COMMERCIAL

## 2023-08-01 VITALS — DIASTOLIC BLOOD PRESSURE: 83 MMHG | HEART RATE: 72 BPM | SYSTOLIC BLOOD PRESSURE: 151 MMHG

## 2023-08-01 DIAGNOSIS — J38.3 OTHER DISEASES OF VOCAL CORDS: ICD-10-CM

## 2023-08-01 DIAGNOSIS — J38.5 LARYNGEAL SPASM: Primary | ICD-10-CM

## 2023-08-01 PROCEDURE — 64617 CHEMODENER MUSCLE LARYNX EMG: CPT | Mod: 50 | Performed by: OTOLARYNGOLOGY

## 2023-08-01 ASSESSMENT — PAIN SCALES - GENERAL: PAINLEVEL: NO PAIN (0)

## 2023-08-01 NOTE — PROGRESS NOTES
Mickey Borden is a 67 year old male with a history of adductor laryngeal dystonia and laryngeal spasm.  he was last injected on 5/30/20223. he had a fair to good response to the last injection. The last dose given was 0.15 units into each thyroarytenoid muscle.  After the injection  he had a breathy dysphonia for 0 weeks.There was no Dysphagia or Dyspnea.  The response lasted for 1.5 months.   Our plan is to give  0.25 units of botulinum a toxin diluted to a concentration of 6.25 units/mL, delivering 0.4 ml  into  each thyroarytenoid muscle today.      PROCEDURE: After obtaining consent, the patient was placed in the supine position. Ground and reference electrodes were applied. The anterior neck was cleaned with an alcohol swab.  Using a 27-gauge, unipolar electromyography needle, the larynx was entered through the cricothyroid space.  0.25 units of botulinum A toxin was injected into each thyroarytenoid muscle.  There was a Strong EMG response to phonation on the left side and a Strong EMG response to phonation on the right side.  The total amount of botulinum A toxin delivered today was 0.5 units. An additional 5 units of botulinum A toxin was necessarily wasted in preparation for the injection. he tolerated the procedure well and left the clinic after a short observation period.         The EMG was necessary specifically in this case to identify areas of muscle overactivity as well as to access the thyroarytenoid muscle which is beneath the thyroid cartilage and is not otherwise directly accessible without EMG guidance.    PLAN: I will have him  follow up on a PRN basis. It is anticipated that repeat injections will be required over the long term.

## 2023-08-01 NOTE — PATIENT INSTRUCTIONS
1.  You were seen in the ENT Clinic today by . If you have any questions or concerns after your appointment, please call [phone]. Press option #1 for scheduling related needs. Press option #3 for Nurse advice.    2.  Plan is to return to clinic 10/3/23 for repeat botox injection      My Adorno LPN  [phone]  [facility] - Otolaryngology

## 2023-08-01 NOTE — LETTER
8/1/2023       RE: Mickey Borden  [id number]  [facility] [address]     Dear Colleague,    Thank you for referring your patient, Mickey Borden, to the [facility] NOSE [facility] THROAT CLINIC [place] at [facility]. Please see a copy of my visit note below.    Mickey Borden is a 67 year old male with a history of adductor laryngeal dystonia and laryngeal spasm.  he was last injected on 5/30/20223. he had a fair to good response to the last injection. The last dose given was 0.15 units into each thyroarytenoid muscle.  After the injection  he had a breathy dysphonia for 0 weeks.There was no Dysphagia or Dyspnea.  The response lasted for 1.5 months.   Our plan is to give  0.25 units of botulinum a toxin diluted to a concentration of 6.25 units/mL, delivering 0.4 ml  into  each thyroarytenoid muscle today.      PROCEDURE: After obtaining consent, the patient was placed in the supine position. Ground and reference electrodes were applied. The anterior neck was cleaned with an alcohol swab.  Using a 27-gauge, unipolar electromyography needle, the larynx was entered through the cricothyroid space.  0.25 units of botulinum A toxin was injected into each thyroarytenoid muscle.  There was a Strong EMG response to phonation on the left side and a Strong EMG response to phonation on the right side.  The total amount of botulinum A toxin delivered today was 0.5 units. An additional 5 units of botulinum A toxin was necessarily wasted in preparation for the injection. he tolerated the procedure well and left the clinic after a short observation period.         The EMG was necessary specifically in this case to identify areas of muscle overactivity as well as to access the thyroarytenoid muscle which is beneath the thyroid cartilage and is not otherwise directly accessible without EMG guidance.    PLAN: I will have him  follow up on a PRN basis. It is anticipated  that repeat injections will be required over the long term.        Again, thank you for allowing me to participate in the care of your patient.      Sincerely,    Tae Fritz MD

## 2023-08-02 NOTE — NURSING NOTE
"Chief Complaint   Patient presents with     RECHECK     Botox         Pulse 60, height 1.549 m (5' 1\"), weight 72.6 kg (160 lb 0.9 oz), SpO2 99 %.    Xenia Arrington, EMT    " NOTE INCOMPLETE/ IN PROGRESS  *Please wait for attending attestation for official recommendations.     HPI:  24-year-old female with history of PCOS, not on any daily medications, presented to the ED with complaints of vaginal bleeding for 1 month with associated lightheadedness and blurry vision.  Patient was sent to the emergency department for further evaluation.  Upon ROS, patient has been complaining of dysuria and increased frequency as well.  Patient found to have elevated serum glucose of 350 and subsequently found to have a gap of 20 bicarb of 17 as well as a pH of 7.2.  Patient was given insulin, fluids and continually reassessed with a gap of 14 and a bicarb of 16 as well as a pH of 7.24.  Patient accepted to CDU for and weight-based insulin regiment, endo c/s, and general monitoring/re-evaluation.    Pt states that she was recently diagnosed with T2DM by her PCP on 6/15/2023. She was not started on any medications and was to be treated with lifestyle modification. Her Hgba1c at that time was 7.8. She states PCP recommended she follow up with endocrine which she did not yet complete. She notes that her diet is poor and consists of rice, pasta and other carb heavy foods. She has a family hx of DM2 in her maternal grandmother, aunts and uncles. She initially presented to ED withlightheadedness, blurry vision, polydipsia, and polyuria but these symptoms have since resolved. Denies nausea, vomiting, diarrhea, tremors.     PAST MEDICAL & SURGICAL HISTORY:  PCOS (polycystic ovarian syndrome)  T2DM          FAMILY HISTORY:  No pertinent family history in first degree relatives        Social History:    Outpatient Medications:    MEDICATIONS  (STANDING):  dextrose 5%. 1000 milliLiter(s) (50 mL/Hr) IV Continuous <Continuous>  dextrose 5%. 1000 milliLiter(s) (100 mL/Hr) IV Continuous <Continuous>  dextrose 50% Injectable 25 Gram(s) IV Push once  dextrose 50% Injectable 25 Gram(s) IV Push once  dextrose 50% Injectable 12.5 Gram(s) IV Push once  glucagon  Injectable 1 milliGRAM(s) IntraMuscular once  insulin glargine Injectable (LANTUS) 15 Unit(s) SubCutaneous at bedtime  insulin lispro (ADMELOG) corrective regimen sliding scale   SubCutaneous three times a day before meals  insulin lispro Injectable (ADMELOG) 5 Unit(s) SubCutaneous three times a day before meals  sodium chloride 0.9%. 1000 milliLiter(s) (150 mL/Hr) IV Continuous <Continuous>    MEDICATIONS  (PRN):  acetaminophen     Tablet .. 650 milliGRAM(s) Oral every 6 hours PRN Temp greater or equal to 38C (100.4F), Mild Pain (1 - 3), Moderate Pain (4 - 6)  dextrose Oral Gel 15 Gram(s) Oral once PRN Blood Glucose LESS THAN 70 milliGRAM(s)/deciliter      Allergies    No Known Allergies    Intolerances      Review of Systems:  Constitutional: No fever  Eyes: No blurry vision  Neuro: No tremors  HEENT: No pain  Cardiovascular: No chest pain, palpitations  Respiratory: No SOB, no cough  GI: No nausea, vomiting, abdominal pain  : No dysuria  Skin: no rash  Psych: no depression  Endocrine: no polyuria, polydipsia  Hem/lymph: no swelling  Osteoporosis: no fractures    ALL OTHER SYSTEMS REVIEWED AND NEGATIVE    UNABLE TO OBTAIN    PHYSICAL EXAM:  VITALS: T(C): 37.2 (08-02-23 @ 08:40)  T(F): 98.9 (08-02-23 @ 08:40), Max: 99 (08-01-23 @ 19:30)  HR: 86 (08-02-23 @ 08:40) (77 - 88)  BP: 104/64 (08-02-23 @ 08:40) (104/64 - 123/51)  RR:  (16 - 18)  SpO2:  (99% - 100%)  Wt(kg): --  GENERAL: NAD, well-groomed, well-developed  EYES: No proptosis, no lid lag, anicteric  HEENT:  Atraumatic, Normocephalic, moist mucous membranes  THYROID: Normal size, no palpable nodules  RESPIRATORY: Clear to auscultation bilaterally; No rales, rhonchi, wheezing  CARDIOVASCULAR: Regular rate and rhythm; No murmurs; no peripheral edema  GI: Soft, nontender, non distended, normal bowel sounds  SKIN: Dry, intact, No rashes or lesions  MUSCULOSKELETAL: Full range of motion, normal strength  NEURO: sensation intact, extraocular movements intact, no tremor  PSYCH: Alert and oriented x 3, normal affect, normal mood  CUSHING'S SIGNS: no striae      CAPILLARY BLOOD GLUCOSE      POCT Blood Glucose.: 254 mg/dL (02 Aug 2023 11:48)  POCT Blood Glucose.: 334 mg/dL (02 Aug 2023 08:56)  POCT Blood Glucose.: 255 mg/dL (02 Aug 2023 06:33)                            15.7   8.66  )-----------( 254      ( 01 Aug 2023 11:59 )             45.7       08-02    135  |  104  |  4<L>  ----------------------------<  264<H>  3.6   |  16<L>  |  0.60    eGFR: 128    Ca    8.3<L>      08-02  Mg     1.70     08-02  Phos  2.5     08-02    TPro  6.6  /  Alb  3.8  /  TBili  0.5  /  DBili  x   /  AST  96<H>  /  ALT  61<H>  /  AlkPhos  79  08-02      Thyroid Function Tests:      A1C with Estimated Average Glucose Result: 10.6 % (08-01-23 @ 11:59)          Radiology:

## 2023-10-02 ENCOUNTER — DOCUMENTATION ONLY (OUTPATIENT)
Dept: PEDIATRICS | Facility: CLINIC | Age: 68
End: 2023-10-02
Payer: COMMERCIAL

## 2023-10-02 DIAGNOSIS — Z12.5 SCREENING FOR PROSTATE CANCER: Primary | ICD-10-CM

## 2023-10-02 DIAGNOSIS — E78.5 HYPERLIPIDEMIA LDL GOAL <130: ICD-10-CM

## 2023-10-02 NOTE — PROGRESS NOTES
Mickey Borden has an upcoming lab appointment:    Future Appointments   Date Time Provider Department Center   10/3/2023  8:30 AM Tae Fritz MD [facility]   10/4/2023  7:00 AM [facility]   10/11/2023 11:00 AM Gallito Barba MD [facility]     Patient is scheduled for the following lab(s): labs per Dr. Barba    There is no order available. Please review and place either future orders or HMPO (Review of Health Maintenance Protocol Orders), as appropriate.    Health Maintenance Due   Topic    ANNUAL REVIEW OF HM ORDERS      Katherine Schoenhals

## 2023-10-03 ENCOUNTER — OFFICE VISIT (OUTPATIENT)
Dept: OTOLARYNGOLOGY | Facility: CLINIC | Age: 68
End: 2023-10-03
Payer: COMMERCIAL

## 2023-10-03 VITALS — BODY MASS INDEX: 27.11 KG/M2 | HEIGHT: 63 IN | WEIGHT: 153 LBS

## 2023-10-03 DIAGNOSIS — N40.1 BENIGN PROSTATIC HYPERPLASIA WITH NOCTURIA: ICD-10-CM

## 2023-10-03 DIAGNOSIS — J38.3 OTHER DISEASES OF VOCAL CORDS: ICD-10-CM

## 2023-10-03 DIAGNOSIS — R35.1 BENIGN PROSTATIC HYPERPLASIA WITH NOCTURIA: ICD-10-CM

## 2023-10-03 DIAGNOSIS — J38.5 LARYNGEAL SPASM: Primary | ICD-10-CM

## 2023-10-03 PROCEDURE — 64617 CHEMODENER MUSCLE LARYNX EMG: CPT | Mod: 50 | Performed by: OTOLARYNGOLOGY

## 2023-10-03 ASSESSMENT — PAIN SCALES - GENERAL: PAINLEVEL: NO PAIN (0)

## 2023-10-03 NOTE — PROGRESS NOTES
Mickey Borden is a 67 year old male with a history of adductor laryngeal dystonia and laryngeal spasm.  he was last injected on 8/1/2023. he had a fair response to the last injection. The last dose given was  0.25 units of botulinum a toxin diluted to a concentration of 6.25 units/mL, delivering 0.4 ml   into each thyroarytenoid muscle.  After the injection  he had a breathy dysphonia for 0 weeks.There was no Dysphagia or Dyspnea.  The response lasted for 1.5 months.  He would like to try going back to the regular concentration of 25 mg/cc given a 0.25 unit dose into each thigh arytenoid.  Our plan is to give 0.25 units of Botulinum A toxin into  each thyroarytenoid muscle today.      PROCEDURE: After obtaining consent, the patient was placed in the supine position. Ground and reference electrodes were applied. The anterior neck was cleaned with an alcohol swab.  Using a 27-gauge, unipolar electromyography needle, the larynx was entered through the cricothyroid space.  0.25 units of botulinum A toxin was injected into each thyroarytenoid muscle.  There was a Strong EMG response to phonation on the left side and a Strong EMG response to phonation on the right side.  The total amount of botulinum A toxin delivered today was 0.5 units. An additional 5 units of botulinum A toxin was necessarily wasted in preparation for the injection. he tolerated the procedure well and left the clinic after a short observation period.         The EMG was necessary specifically in this case to identify areas of muscle overactivity as well as to access the thyroarytenoid muscle which is beneath the thyroid cartilage and is not otherwise directly accessible without EMG guidance.    PLAN: I will have him  follow up on a PRN basis. It is anticipated that repeat injections will be required over the long term.

## 2023-10-03 NOTE — LETTER
10/3/2023       RE: Mickey Borden  [id number] [facility] [address]     Dear Colleague,    Thank you for referring your patient, Mickey Borden, to the [facility] EAR NOSE AND THROAT CLINIC [place] at [facility]. Please see a copy of my visit note below.    Mickey Borden is a 67 year old male with a history of adductor laryngeal dystonia and laryngeal spasm.  he was last injected on 8/1/2023. he had a fair response to the last injection. The last dose given was  0.25 units of botulinum a toxin diluted to a concentration of 6.25 units/mL, delivering 0.4 ml   into each thyroarytenoid muscle.  After the injection  he had a breathy dysphonia for 0 weeks.There was no Dysphagia or Dyspnea.  The response lasted for 1.5 months.  He would like to try going back to the regular concentration of 25 mg/cc given a 0.25 unit dose into each thigh arytenoid.  Our plan is to give 0.25 units of Botulinum A toxin into  each thyroarytenoid muscle today.      PROCEDURE: After obtaining consent, the patient was placed in the supine position. Ground and reference electrodes were applied. The anterior neck was cleaned with an alcohol swab.  Using a 27-gauge, unipolar electromyography needle, the larynx was entered through the cricothyroid space.  0.25 units of botulinum A toxin was injected into each thyroarytenoid muscle.  There was a Strong EMG response to phonation on the left side and a Strong EMG response to phonation on the right side.  The total amount of botulinum A toxin delivered today was 0.5 units. An additional 5 units of botulinum A toxin was necessarily wasted in preparation for the injection. he tolerated the procedure well and left the clinic after a short observation period.         The EMG was necessary specifically in this case to identify areas of muscle overactivity as well as to access the thyroarytenoid muscle which is beneath the thyroid  cartilage and is not otherwise directly accessible without EMG guidance.    PLAN: I will have him  follow up on a PRN basis. It is anticipated that repeat injections will be required over the long term.    Sincerely,  Tae Fritz MD

## 2023-10-03 NOTE — PATIENT INSTRUCTIONS
1.  You were seen in the ENT Clinic today by . If you have any questions or concerns after your appointment, please call [phone]. Press option #1 for scheduling related needs. Press option #3 for Nurse advice.    2  Plan is to return to clinic 12/12/23 for repeat botox injection      My Adorno LPN  [phone]  [facility] - Otolaryngology

## 2023-10-04 ENCOUNTER — LAB (OUTPATIENT)
Dept: LAB | Facility: CLINIC | Age: 68
End: 2023-10-04
Payer: COMMERCIAL

## 2023-10-04 DIAGNOSIS — E78.5 HYPERLIPIDEMIA LDL GOAL <130: ICD-10-CM

## 2023-10-04 DIAGNOSIS — Z12.5 SCREENING FOR PROSTATE CANCER: ICD-10-CM

## 2023-10-04 LAB
ALBUMIN SERPL BCG-MCNC: 4.3 G/DL (ref 3.5–5.2)
ALP SERPL-CCNC: 50 U/L (ref 40–129)
ALT SERPL W P-5'-P-CCNC: 29 U/L (ref 0–70)
ANION GAP SERPL CALCULATED.3IONS-SCNC: 12 MMOL/L (ref 7–15)
AST SERPL W P-5'-P-CCNC: 50 U/L (ref 0–45)
BILIRUB SERPL-MCNC: 0.5 MG/DL
BUN SERPL-MCNC: 10.8 MG/DL (ref 8–23)
CALCIUM SERPL-MCNC: 9 MG/DL (ref 8.8–10.2)
CHLORIDE SERPL-SCNC: 104 MMOL/L (ref 98–107)
CHOLEST SERPL-MCNC: 193 MG/DL
CREAT SERPL-MCNC: 1.2 MG/DL (ref 0.67–1.17)
DEPRECATED HCO3 PLAS-SCNC: 24 MMOL/L (ref 22–29)
EGFRCR SERPLBLD CKD-EPI 2021: 66 ML/MIN/1.73M2
GLUCOSE SERPL-MCNC: 98 MG/DL (ref 70–99)
HDLC SERPL-MCNC: 60 MG/DL
LDLC SERPL CALC-MCNC: 124 MG/DL
NONHDLC SERPL-MCNC: 133 MG/DL
POTASSIUM SERPL-SCNC: 4.3 MMOL/L (ref 3.4–5.3)
PROT SERPL-MCNC: 6.7 G/DL (ref 6.4–8.3)
PSA SERPL DL<=0.01 NG/ML-MCNC: 0.49 NG/ML (ref 0–4.5)
SODIUM SERPL-SCNC: 140 MMOL/L (ref 135–145)
TRIGL SERPL-MCNC: 46 MG/DL

## 2023-10-04 PROCEDURE — 80053 COMPREHEN METABOLIC PANEL: CPT

## 2023-10-04 PROCEDURE — 36415 COLL VENOUS BLD VENIPUNCTURE: CPT

## 2023-10-04 PROCEDURE — 80061 LIPID PANEL: CPT

## 2023-10-04 PROCEDURE — G0103 PSA SCREENING: HCPCS

## 2023-10-05 RX ORDER — TAMSULOSIN HYDROCHLORIDE 0.4 MG/1
0.8 CAPSULE ORAL DAILY
Qty: 180 CAPSULE | Refills: 4 | Status: SHIPPED | OUTPATIENT
Start: 2023-10-05 | End: 2024-04-03

## 2023-10-11 ENCOUNTER — OFFICE VISIT (OUTPATIENT)
Dept: PEDIATRICS | Facility: CLINIC | Age: 68
End: 2023-10-11
Payer: COMMERCIAL

## 2023-10-11 VITALS
SYSTOLIC BLOOD PRESSURE: 122 MMHG | BODY MASS INDEX: 29.91 KG/M2 | DIASTOLIC BLOOD PRESSURE: 60 MMHG | HEIGHT: 61 IN | HEART RATE: 53 BPM | WEIGHT: 158.4 LBS | OXYGEN SATURATION: 97 %

## 2023-10-11 DIAGNOSIS — I10 BENIGN ESSENTIAL HYPERTENSION: ICD-10-CM

## 2023-10-11 DIAGNOSIS — Z23 NEED FOR VACCINATION: ICD-10-CM

## 2023-10-11 DIAGNOSIS — Z00.00 ROUTINE GENERAL MEDICAL EXAMINATION AT A HEALTH CARE FACILITY: Primary | ICD-10-CM

## 2023-10-11 PROCEDURE — G0008 ADMIN INFLUENZA VIRUS VAC: HCPCS | Performed by: INTERNAL MEDICINE

## 2023-10-11 PROCEDURE — 90662 IIV NO PRSV INCREASED AG IM: CPT | Performed by: INTERNAL MEDICINE

## 2023-10-11 PROCEDURE — 99213 OFFICE O/P EST LOW 20 MIN: CPT | Mod: 24 | Performed by: INTERNAL MEDICINE

## 2023-10-11 PROCEDURE — G0439 PPPS, SUBSEQ VISIT: HCPCS | Performed by: INTERNAL MEDICINE

## 2023-10-11 RX ORDER — DILTIAZEM HYDROCHLORIDE 180 MG/1
180 CAPSULE, EXTENDED RELEASE ORAL DAILY
Qty: 90 CAPSULE | Refills: 4 | Status: SHIPPED | OUTPATIENT
Start: 2023-10-11

## 2023-10-11 RX ORDER — LISINOPRIL 20 MG/1
20 TABLET ORAL DAILY
Qty: 90 TABLET | Refills: 4 | Status: SHIPPED | OUTPATIENT
Start: 2023-10-11

## 2023-10-11 RX ORDER — RESPIRATORY SYNCYTIAL VIRUS VACCINE 120MCG/0.5
0.5 KIT INTRAMUSCULAR ONCE
Qty: 1 EACH | Refills: 0 | Status: CANCELLED | OUTPATIENT
Start: 2023-10-11 | End: 2023-10-11

## 2023-10-11 ASSESSMENT — ENCOUNTER SYMPTOMS
EYE PAIN: 0
DIZZINESS: 0
HEMATOCHEZIA: 0
HEADACHES: 0
PALPITATIONS: 0
FREQUENCY: 0
DYSURIA: 0
FEVER: 0
COUGH: 0
NERVOUS/ANXIOUS: 0
CONSTIPATION: 0
CHILLS: 0
DIARRHEA: 0
ABDOMINAL PAIN: 0
ARTHRALGIAS: 0
SHORTNESS OF BREATH: 0
WEAKNESS: 0
JOINT SWELLING: 0
SORE THROAT: 0
MYALGIAS: 0
NAUSEA: 0
HEARTBURN: 0
HEMATURIA: 0
PARESTHESIAS: 0

## 2023-10-11 ASSESSMENT — ACTIVITIES OF DAILY LIVING (ADL): CURRENT_FUNCTION: NO ASSISTANCE NEEDED

## 2023-10-11 NOTE — PATIENT INSTRUCTIONS
Patient Education   Personalized Prevention Plan  You are due for the preventive services outlined below.  Your care team is available to assist you in scheduling these services.  If you have already completed any of these items, please share that information with your care team to update in your medical record.  Health Maintenance Due   Topic Date Due     ANNUAL REVIEW OF HM ORDERS  Never done     RSV VACCINE 60+ (1 - 1-dose 60+ series) Never done     AORTIC ANEURYSM SCREENING (SYSTEM ASSIGNED)  Never done     COVID-19 Vaccine (5 - Pfizer series) 02/11/2023

## 2023-10-11 NOTE — PROGRESS NOTES
"SUBJECTIVE:   Alexander is a 67 year old who presents for Preventive Visit.    Are you in the first 12 months of your Medicare coverage?  No    Healthy Habits:     In general, how would you rate your overall health?  Excellent    Frequency of exercise:  6-7 days/week    Duration of exercise:  45-60 minutes    Do you usually eat at least 4 servings of fruit and vegetables a day, include whole grains    & fiber and avoid regularly eating high fat or \"junk\" foods?  Yes    Taking medications regularly:  Yes    Medication side effects:  Lightheadedness    Ability to successfully perform activities of daily living:  No assistance needed    Home Safety:  No safety concerns identified    Hearing Impairment:  No hearing concerns    In the past 6 months, have you been bothered by leaking of urine?  No    In general, how would you rate your overall mental or emotional health?  Excellent    Additional concerns today:  No      Today's PHQ-2 Score:       10/11/2023    10:37 AM   PHQ-2 ( 1999 Pfizer)   Q1: Little interest or pleasure in doing things 0   Q2: Feeling down, depressed or hopeless 0   PHQ-2 Score 0   Q1: Little interest or pleasure in doing things Not at all   Q2: Feeling down, depressed or hopeless Not at all   PHQ-2 Score 0     Here for AWV. Overall is feeling well.    HTN. No cardiac sx such as CP, palpitations, PND, orthopnea, GARCIA or peripheral edema.   BP Readings from Last 3 Encounters:   10/11/23 122/60   08/01/23 (!) 151/83   06/30/23 (!) 144/81     Had a documented RSV infection last year. Reviewed options for RSV vaccine. He will defer for now.     Have you ever done Advance Care Planning? (For example, a Health Directive, POLST, or a discussion with a medical provider or your loved ones about your wishes): Yes, patient states has an Advance Care Planning document and will bring a copy to the clinic.       Fall risk  Fallen 2 or more times in the past year?: No  Any fall with injury in the past year?: " No    Cognitive Screening   1) Repeat 3 items (Leader, Season, Table)    2) Clock draw: ABNORMAL big hand not pointing to the correct time  3) 3 item recall: Recalls 2 objects   Results: ABNORMAL clock, 1-2 items recalled: PROBABLE COGNITIVE IMPAIRMENT, **INFORM PROVIDER**    Mini-CogTM Copyright DAYSI Bautista. Licensed by the author for use in [facility]; reprinted with permission (larry@[facility]). All rights reserved.      Do you have sleep apnea, excessive snoring or daytime drowsiness? : no    Social History     Tobacco Use     Smoking status: Never     Smokeless tobacco: Never   Substance Use Topics     Alcohol use: Yes     Comment: moderate         10/11/2023    10:36 AM   Alcohol Use   Prescreen: >3 drinks/day or >7 drinks/week? No     Do you have a current opioid prescription? No  Do you use any other controlled substances or medications that are not prescribed by a provider? None              Current providers sharing in care for this patient include:   Patient Care Team:  Gallito Barba MD as PCP - General (Internal Medicine)  Chelsea Bautista RN as Registered Nurse (Otolaryngology)  Tae Fritz MD as MD (Otolaryngology)  Gallito Barba MD as Assigned PCP  Tae Fritz MD as Assigned Surgical Provider  Casey Wolfe MD as Resident (Otolaryngology)  Yenifer Olivares PA-C as Physician Assistant (Urology)    The following health maintenance items are reviewed in Epic and correct as of today:  Health Maintenance   Topic Date Due     ANNUAL REVIEW OF HM ORDERS  Never done     RSV VACCINE 60+ (1 - 1-dose 60+ series) Never done     AORTIC ANEURYSM SCREENING (SYSTEM ASSIGNED)  Never done     COVID-19 Vaccine (5 - Pfizer series) 02/11/2023     MEDICARE ANNUAL WELLNESS VISIT  07/29/2023     INFLUENZA VACCINE (1) 09/01/2023     FALL RISK ASSESSMENT  10/11/2024     COLORECTAL CANCER SCREENING  10/26/2025     ADVANCE CARE PLANNING  07/29/2027     DTAP/TDAP/TD IMMUNIZATION (4 - Td  "or Tdap) 11/29/2027     LIPID  10/04/2028     HEPATITIS C SCREENING  Completed     PHQ-2 (once per calendar year)  Completed     Pneumococcal Vaccine: 65+ Years  Completed     ZOSTER IMMUNIZATION  Completed     IPV IMMUNIZATION  Aged Out     HPV IMMUNIZATION  Aged Out     MENINGITIS IMMUNIZATION  Aged Out         Review of Systems   Constitutional:  Negative for chills and fever.   HENT:  Negative for congestion, ear pain, hearing loss and sore throat.    Eyes:  Negative for pain and visual disturbance.   Respiratory:  Negative for cough and shortness of breath.    Cardiovascular:  Negative for chest pain, palpitations and peripheral edema.   Gastrointestinal:  Negative for abdominal pain, constipation, diarrhea, heartburn, hematochezia and nausea.   Genitourinary:  Negative for dysuria, frequency, genital sores, hematuria, impotence, penile discharge and urgency.   Musculoskeletal:  Negative for arthralgias, joint swelling and myalgias.   Skin:  Negative for rash.   Neurological:  Negative for dizziness, weakness, headaches and paresthesias.   Psychiatric/Behavioral:  Negative for mood changes. The patient is not nervous/anxious.          OBJECTIVE:   /60 (BP Location: Right arm, Patient Position: Sitting, Cuff Size: Adult Regular)   Pulse 53   Ht 1.556 m (5' 1.25\")   Wt 71.8 kg (158 lb 6.4 oz)   SpO2 97%   BMI 29.69 kg/m   Estimated body mass index is 29.69 kg/m  as calculated from the following:    Height as of this encounter: 1.556 m (5' 1.25\").    Weight as of this encounter: 71.8 kg (158 lb 6.4 oz).  Physical Exam  GENERAL: healthy, alert and no distress  EYES: PERRL, EOMI  HENT: ear canals and TM's normal. No nasal discharge. OP moist.  NECK: no adenopathy  RESP: lungs clear to auscultation - no rales, rhonchi or wheezes  CV: regular rate and rhythm, normal S1 S2, no murmur, no peripheral edema and peripheral pulses strong  ABDOMEN: soft, nontender, bowel sounds normal  MS: no gross musculoskeletal " "defects noted  SKIN: no suspicious lesions or rashes  NEURO: Normal strength and tone  PSYCH: mentation appears normal, affect normal/bright       ASSESSMENT / PLAN:       ICD-10-CM    1. Routine general medical examination at a health care facility  Z00.00       2. Benign essential hypertension  I10 diltiazem ER (DILT-XR) 180 MG 24 hr capsule     lisinopril (ZESTRIL) 20 MG tablet      3. Need for vaccination  Z23 INFLUENZA VACCINE 65+ (FLUZONE HD)        Overall doing well.  No med changes today.       COUNSELING:  Reviewed preventive health counseling, as reflected in patient instructions      BMI:   Estimated body mass index is 29.69 kg/m  as calculated from the following:    Height as of this encounter: 1.556 m (5' 1.25\").    Weight as of this encounter: 71.8 kg (158 lb 6.4 oz).         He reports that he has never smoked. He has never used smokeless tobacco.      Appropriate preventive services were discussed with this patient, including applicable screening as appropriate for fall prevention, nutrition, physical activity, Tobacco-use cessation, weight loss and cognition.  Checklist reviewing preventive services available has been given to the patient.    Reviewed patients plan of care and provided an AVS. The Basic Care Plan (routine screening as documented in Health Maintenance) for Mickey meets the Care Plan requirement. This Care Plan has been established and reviewed with the Patient.        Gallito Barba MD  [facility]      "

## 2023-11-27 DIAGNOSIS — J38.5 LARYNGEAL SPASM: Primary | ICD-10-CM

## 2023-12-12 ENCOUNTER — OFFICE VISIT (OUTPATIENT)
Dept: OTOLARYNGOLOGY | Facility: CLINIC | Age: 68
End: 2023-12-12
Payer: COMMERCIAL

## 2023-12-12 VITALS — WEIGHT: 150 LBS | BODY MASS INDEX: 26.58 KG/M2 | HEIGHT: 63 IN

## 2023-12-12 DIAGNOSIS — J38.3 OTHER DISEASES OF VOCAL CORDS: ICD-10-CM

## 2023-12-12 DIAGNOSIS — J38.5 LARYNGEAL SPASM: Primary | ICD-10-CM

## 2023-12-12 PROCEDURE — 64617 CHEMODENER MUSCLE LARYNX EMG: CPT | Mod: 50 | Performed by: OTOLARYNGOLOGY

## 2023-12-12 ASSESSMENT — PAIN SCALES - GENERAL: PAINLEVEL: NO PAIN (0)

## 2023-12-12 NOTE — NURSING NOTE
Invasive Procedure Safety Checklist  Procedure:  Botox injection    Responsible person(s):  Complete sections as appropriate and electronically sign and date below.    Staff/Provider  Consent documentation on chart:  YES  H&P is not applicable (when straight local anesthesia is used).    Procedure Team  Completed by comparing informed consent documentation, information on the patient record and/or the marked surgical site, and discussion with the patient/guardian.     Verified:  (Select all that apply)  Patient identification (two indicators)  Procedure to be performed  Procedure site and /or laterality and/or level  Consent  Procedure site:  Site can not be marked due to location.  Provider Kan - Site/Laterality/Level:  No Level or Structure  Staff/Provider:  No images    Procedure Team:  *Pause for the Cause* verbal and active participation of team members- verify:  Patient name:  YES  Procedure to be performed:  YES  Site, laterality and level, noting patient position:  YES    Above steps completed as applicable (Electronic Signature, Title, Date):    CELIA LAW LPN on 12/12/2023 at 2:14 PM      Note:  Any incidents of wrong patient, wrong procedure, or wrong site are reported using the Occurrence Process already in place.  The occurrence form is required to be completed immediately with this type of event.

## 2023-12-12 NOTE — LETTER
12/12/2023       RE: Mickey Borden  [id number]  [facility] [address]     Dear Colleague,    Thank you for referring your patient, Mickey Borden, to the [facility] E[facility] NOSE AND THROAT CLINIC [place] at [facility]. Please see a copy of my visit note below.    Mickey Borden is a 68 year old male with a history of adductor laryngeal dystonia and laryngeal spasm.  he was last injected on 10/3/2023. he had a good response to the last injection. The last dose given was 0.25 units at the usual concentration of 25 units/cc into each thyroarytenoid muscle.  Previous to that he had a similar dose at a diluted concentration of 6.25 units/cc.  After the injection  he had a breathy dysphonia for 0 weeks.There was no Dysphagia or Dyspnea.  The response lasted for 2 months.   Our plan is to give 0.25 units of Botulinum A toxin into  each thyroarytenoid muscle today.      PROCEDURE: After obtaining consent, the patient was placed in the supine position. Ground and reference electrodes were applied. The anterior neck was cleaned with an alcohol swab.  Using a 27-gauge, unipolar electromyography needle, the larynx was entered through the cricothyroid space.  0.25 units of botulinum A toxin was injected into each thyroarytenoid muscle.  There was a Strong EMG response to phonation on the left side and a Strong EMG response to phonation on the right side.  The total amount of botulinum A toxin delivered today was 0.5 units. An additional 5 units of botulinum A toxin was necessarily wasted in preparation for the injection. he tolerated the procedure well and left the clinic after a short observation period.         The EMG was necessary specifically in this case to identify areas of muscle overactivity as well as to access the thyroarytenoid muscle which is beneath the thyroid cartilage and is not otherwise directly accessible without EMG guidance.    PLAN: I will  have him  follow up on a PRN basis. It is anticipated that repeat injections will be required over the long term.        Again, thank you for allowing me to participate in the care of your patient.      Sincerely,    Tae Fritz MD

## 2023-12-12 NOTE — PATIENT INSTRUCTIONS
1.  You were seen in the ENT Clinic today by . If you have any questions or concerns after your appointment, please call [phone]. Press option #1 for scheduling related needs. Press option #3 for Nurse advice.     2. Plan is to return to clinic 2/13/2024 for repeat botox injection.        My Adorno LPN  [phone]  [facility] - Otolaryngology

## 2023-12-12 NOTE — PROGRESS NOTES
Mickey Borden is a 68 year old male with a history of adductor laryngeal dystonia and laryngeal spasm.  he was last injected on 10/3/2023. he had a good response to the last injection. The last dose given was 0.25 units at the usual concentration of 25 units/cc into each thyroarytenoid muscle.  Previous to that he had a similar dose at a diluted concentration of 6.25 units/cc.  After the injection  he had a breathy dysphonia for 0 weeks.There was no Dysphagia or Dyspnea.  The response lasted for 2 months.   Our plan is to give 0.25 units of Botulinum A toxin into  each thyroarytenoid muscle today.      PROCEDURE: After obtaining consent, the patient was placed in the supine position. Ground and reference electrodes were applied. The anterior neck was cleaned with an alcohol swab.  Using a 27-gauge, unipolar electromyography needle, the larynx was entered through the cricothyroid space.  0.25 units of botulinum A toxin was injected into each thyroarytenoid muscle.  There was a Strong EMG response to phonation on the left side and a Strong EMG response to phonation on the right side.  The total amount of botulinum A toxin delivered today was 0.5 units. An additional 5 units of botulinum A toxin was necessarily wasted in preparation for the injection. he tolerated the procedure well and left the clinic after a short observation period.         The EMG was necessary specifically in this case to identify areas of muscle overactivity as well as to access the thyroarytenoid muscle which is beneath the thyroid cartilage and is not otherwise directly accessible without EMG guidance.    PLAN: I will have him  follow up on a PRN basis. It is anticipated that repeat injections will be required over the long term.

## 2024-01-10 ENCOUNTER — OFFICE VISIT (OUTPATIENT)
Dept: URGENT CARE | Facility: URGENT CARE | Age: 69
End: 2024-01-10
Payer: COMMERCIAL

## 2024-01-10 ENCOUNTER — HOSPITAL ENCOUNTER (EMERGENCY)
Facility: CLINIC | Age: 69
Discharge: HOME | End: 2024-01-10
Attending: FAMILY MEDICINE | Admitting: FAMILY MEDICINE
Payer: COMMERCIAL

## 2024-01-10 ENCOUNTER — APPOINTMENT (OUTPATIENT)
Dept: CT IMAGING | Facility: CLINIC | Age: 69
End: 2024-01-10
Attending: FAMILY MEDICINE
Payer: COMMERCIAL

## 2024-01-10 VITALS
BODY MASS INDEX: 28.34 KG/M2 | HEART RATE: 61 BPM | TEMPERATURE: 98 F | DIASTOLIC BLOOD PRESSURE: 80 MMHG | WEIGHT: 160 LBS | OXYGEN SATURATION: 98 % | SYSTOLIC BLOOD PRESSURE: 147 MMHG | RESPIRATION RATE: 16 BRPM

## 2024-01-10 VITALS
HEART RATE: 72 BPM | OXYGEN SATURATION: 97 % | TEMPERATURE: 97.3 F | SYSTOLIC BLOOD PRESSURE: 134 MMHG | RESPIRATION RATE: 14 BRPM | BODY MASS INDEX: 28.34 KG/M2 | DIASTOLIC BLOOD PRESSURE: 80 MMHG | WEIGHT: 160 LBS

## 2024-01-10 DIAGNOSIS — I10 PRIMARY HYPERTENSION: ICD-10-CM

## 2024-01-10 DIAGNOSIS — R10.31 RLQ ABDOMINAL PAIN: Primary | ICD-10-CM

## 2024-01-10 DIAGNOSIS — K57.92 ACUTE DIVERTICULITIS: ICD-10-CM

## 2024-01-10 DIAGNOSIS — R10.11 RUQ ABDOMINAL PAIN: ICD-10-CM

## 2024-01-10 LAB
ALBUMIN SERPL BCG-MCNC: 4.6 G/DL (ref 3.5–5.2)
ALBUMIN UR-MCNC: NEGATIVE MG/DL
ALP SERPL-CCNC: 57 U/L (ref 40–150)
ALT SERPL W P-5'-P-CCNC: 27 U/L (ref 0–70)
ANION GAP SERPL CALCULATED.3IONS-SCNC: 15 MMOL/L (ref 7–15)
APPEARANCE UR: CLEAR
AST SERPL W P-5'-P-CCNC: 30 U/L (ref 0–45)
BASOPHILS # BLD AUTO: 0 10E3/UL (ref 0–0.2)
BASOPHILS NFR BLD AUTO: 0 %
BILIRUB SERPL-MCNC: 0.8 MG/DL
BILIRUB UR QL STRIP: NEGATIVE
BUN SERPL-MCNC: 15.8 MG/DL (ref 8–23)
CALCIUM SERPL-MCNC: 9.3 MG/DL (ref 8.8–10.2)
CHLORIDE SERPL-SCNC: 98 MMOL/L (ref 98–107)
COLOR UR AUTO: NORMAL
CREAT SERPL-MCNC: 1.09 MG/DL (ref 0.67–1.17)
DEPRECATED HCO3 PLAS-SCNC: 23 MMOL/L (ref 22–29)
EGFRCR SERPLBLD CKD-EPI 2021: 73 ML/MIN/1.73M2
EOSINOPHIL # BLD AUTO: 0.1 10E3/UL (ref 0–0.7)
EOSINOPHIL NFR BLD AUTO: 1 %
ERYTHROCYTE [DISTWIDTH] IN BLOOD BY AUTOMATED COUNT: 13.2 % (ref 10–15)
GLUCOSE SERPL-MCNC: 96 MG/DL (ref 70–99)
GLUCOSE UR STRIP-MCNC: NEGATIVE MG/DL
HCT VFR BLD AUTO: 43.1 % (ref 40–53)
HGB BLD-MCNC: 15 G/DL (ref 13.3–17.7)
HGB UR QL STRIP: NEGATIVE
IMM GRANULOCYTES # BLD: 0 10E3/UL
IMM GRANULOCYTES NFR BLD: 0 %
KETONES UR STRIP-MCNC: NEGATIVE MG/DL
LEUKOCYTE ESTERASE UR QL STRIP: NEGATIVE
LIPASE SERPL-CCNC: 18 U/L (ref 13–60)
LYMPHOCYTES # BLD AUTO: 2 10E3/UL (ref 0.8–5.3)
LYMPHOCYTES NFR BLD AUTO: 20 %
MCH RBC QN AUTO: 33.8 PG (ref 26.5–33)
MCHC RBC AUTO-ENTMCNC: 34.8 G/DL (ref 31.5–36.5)
MCV RBC AUTO: 97 FL (ref 78–100)
MONOCYTES # BLD AUTO: 0.8 10E3/UL (ref 0–1.3)
MONOCYTES NFR BLD AUTO: 8 %
NEUTROPHILS # BLD AUTO: 7.1 10E3/UL (ref 1.6–8.3)
NEUTROPHILS NFR BLD AUTO: 71 %
NITRATE UR QL: NEGATIVE
NRBC # BLD AUTO: 0 10E3/UL
NRBC BLD AUTO-RTO: 0 /100
PH UR STRIP: 7 [PH] (ref 5–7)
PLATELET # BLD AUTO: 163 10E3/UL (ref 150–450)
POTASSIUM SERPL-SCNC: 4 MMOL/L (ref 3.4–5.3)
PROT SERPL-MCNC: 8 G/DL (ref 6.4–8.3)
RBC # BLD AUTO: 4.44 10E6/UL (ref 4.4–5.9)
RBC URINE: 0 /HPF
SODIUM SERPL-SCNC: 136 MMOL/L (ref 135–145)
SP GR UR STRIP: 1 (ref 1–1.03)
UROBILINOGEN UR STRIP-MCNC: NORMAL MG/DL
WBC # BLD AUTO: 10.1 10E3/UL (ref 4–11)
WBC URINE: 0 /HPF

## 2024-01-10 PROCEDURE — 36415 COLL VENOUS BLD VENIPUNCTURE: CPT | Performed by: FAMILY MEDICINE

## 2024-01-10 PROCEDURE — 250N000011 HC RX IP 250 OP 636: Performed by: FAMILY MEDICINE

## 2024-01-10 PROCEDURE — 85025 COMPLETE CBC W/AUTO DIFF WBC: CPT | Performed by: FAMILY MEDICINE

## 2024-01-10 PROCEDURE — 81001 URINALYSIS AUTO W/SCOPE: CPT | Performed by: FAMILY MEDICINE

## 2024-01-10 PROCEDURE — 99285 EMERGENCY DEPT VISIT HI MDM: CPT | Mod: 25 | Performed by: FAMILY MEDICINE

## 2024-01-10 PROCEDURE — 80053 COMPREHEN METABOLIC PANEL: CPT | Performed by: FAMILY MEDICINE

## 2024-01-10 PROCEDURE — 74177 CT ABD & PELVIS W/CONTRAST: CPT

## 2024-01-10 PROCEDURE — 250N000009 HC RX 250: Performed by: FAMILY MEDICINE

## 2024-01-10 PROCEDURE — 83690 ASSAY OF LIPASE: CPT | Performed by: FAMILY MEDICINE

## 2024-01-10 PROCEDURE — 99214 OFFICE O/P EST MOD 30 MIN: CPT | Performed by: NURSE PRACTITIONER

## 2024-01-10 PROCEDURE — 99284 EMERGENCY DEPT VISIT MOD MDM: CPT | Performed by: FAMILY MEDICINE

## 2024-01-10 RX ORDER — IOPAMIDOL 755 MG/ML
78 INJECTION, SOLUTION INTRAVASCULAR ONCE
Status: COMPLETED | OUTPATIENT
Start: 2024-01-10 | End: 2024-01-10

## 2024-01-10 RX ADMIN — SODIUM CHLORIDE 59 ML: 9 INJECTION, SOLUTION INTRAVENOUS at 17:00

## 2024-01-10 RX ADMIN — IOPAMIDOL 78 ML: 755 INJECTION, SOLUTION INTRAVENOUS at 16:59

## 2024-01-10 ASSESSMENT — ACTIVITIES OF DAILY LIVING (ADL): ADLS_ACUITY_SCORE: 35

## 2024-01-10 NOTE — ED TRIAGE NOTES
Pt woke up this morning with RLQ pain.  States he had runny stool this morning, no vomiting.  Complains of general overall body aches.  Afebrile.  Pt instructed to remain NPO. No history of abdominal surgeries.     Triage Assessment (Adult)       Row Name 01/10/24 1829          Triage Assessment    Airway WDL WDL        Respiratory WDL    Respiratory WDL WDL        Skin Circulation/Temperature WDL    Skin Circulation/Temperature WDL WDL        Cardiac WDL    Cardiac WDL WDL        Peripheral/Neurovascular WDL    Peripheral Neurovascular WDL WDL        Cognitive/Neuro/Behavioral WDL    Cognitive/Neuro/Behavioral WDL WDL

## 2024-01-10 NOTE — PROGRESS NOTES
Assessment & Plan     1. RLQ abdominal pain      2. RUQ abdominal pain      3. Primary hypertension    Due to patient's symptoms of right lower quadrant and upper quadrant pain with positive Bennett sign and Mc burneys sign without history of cholecystectomy or appendectomy he also has general malaise with diarrhea recommend patient be seen through a higher acuity level of care will likely need imaging and further lab workup per provider recommendations.  Discussed this with patient he verbalized understanding states his wife will drive him to [facility] emergency department for further evaluation.    HERMANN Hare CNP  [facility] URGENT CARE [place]    Andres Munoz is a 68 year old male who presents to clinic today for the following health issues:  Chief Complaint   Patient presents with    Abdominal Pain     RLQ sharp pain, intermittent pain varies, started this morning, hurts when he coughs, has not had a normal bowel movement today and states he is usually not constipated. This is something new for him, has not experienced before, has been able to urinate comfortably.     HPI    Patient presents to clinic states earlier this morning started to have symptoms of right lower abdominal pain that have been sharp and wavelike.  He also was having some bouts of diarrhea.  Describing general malaise with mild nausea.  States he is urinating well and normal.  He has increased his fluid intake he has not eaten food since earlier this morning denies vomiting.  Denies previous history of abdominal surgery.  He does have a history of hypertension with a mild elevation of blood pressure today.      Review of Systems  Constitutional, HEENT, cardiovascular, pulmonary, gi and gu systems are negative, except as otherwise noted.      Objective    BP (!) 147/80   Pulse 61   Temp 98  F (36.7  C) (Tympanic)   Resp 16   Wt 72.6 kg (160 lb)   SpO2 98%   BMI 28.34 kg/m    Physical Exam   GENERAL:  alert, no distress, and fatigued  EYES: Eyes grossly normal to inspection, PERRL and conjunctivae and sclerae normal  HENT: ear canals and TM's normal, nose and mouth without ulcers or lesions  NECK: no adenopathy, no asymmetry, masses, or scars and thyroid normal to palpation  RESP: lungs clear to auscultation - no rales, rhonchi or wheezes  CV: regular rate and rhythm, normal S1 S2, no S3 or S4, no murmur, click or rub, no peripheral edema and peripheral pulses strong  ABDOMEN: tenderness RUQ, RLQ, and umbilical, positive McBurney's and Bennett's signs no organomegaly or masses, liver span normal to percussion, bowel sounds normal, no palpable or pulsatile masses, no bruits heard, and no palpable renal abnormalities   MS: no gross musculoskeletal defects noted, no edema  SKIN: no suspicious lesions or rashes  BACK: no CVA tenderness, no paralumbar tenderness

## 2024-01-10 NOTE — ED PROVIDER NOTES
History     Chief Complaint   Patient presents with    Abdominal Pain     HPI  Mickey Borden is a 68 year old male who presents with a history of laryngeal spasm and status post botulinum injections for this.  Hypertension hyperlipidemia here with right lower quadrant abdominal pain and no prior abdominal surgery.  Onset of symptoms overnight.  Worse with coughing.    No scrotal swelling.      Allergies:  No Known Allergies    Problem List:    Patient Active Problem List    Diagnosis Date Noted    Aneurysm of right internal carotid artery 11/03/2020     Priority: Medium    Mixed type age-related cataract, both eyes 08/10/2020     Priority: Medium    Benign essential hypertension 11/21/2017     Priority: Medium    Hyperlipidemia LDL goal <130 11/21/2017     Priority: Medium    HTN (hypertension) 08/17/2015     Priority: Medium    Laryngeal spasm 09/20/2011     Priority: Medium        Past Medical History:    No past medical history on file.    Past Surgical History:    Past Surgical History:   Procedure Laterality Date    LASIK Bilateral 2002       Family History:    Family History   Problem Relation Age of Onset    Hypertension Mother     Melanoma Father     Coronary Artery Disease Sister     Cataracts Brother        Social History:  Marital Status:   [2]  Social History     Tobacco Use    Smoking status: Never    Smokeless tobacco: Never   Substance Use Topics    Alcohol use: Yes     Comment: moderate    Drug use: No        Medications:    botulinum toxin type A (BOTOX) 100 units injection  diltiazem ER (DILT-XR) 180 MG 24 hr capsule  lisinopril (ZESTRIL) 20 MG tablet  Multiple Vitamin (DAILY VITAMIN PO)  Omega-3 Fatty Acids (FISH OIL) 1200 MG CAPS  Saw Palmetto, Serenoa repens, (SAW PALMETTO CONCENTRATE OR)  tamsulosin (FLOMAX) 0.4 MG capsule          Review of Systems  ROS:  5 point ROS negative except as noted above in HPI, including Gen., Resp., CV, GI &  system review.      BP: (!) 142/96  Temp:  97.3  F (36.3  C)  Resp: 16  Weight: 72.6 kg (160 lb)  SpO2: 98 %      Physical Exam  Eyes:      Conjunctiva/sclera: Conjunctivae normal.   Cardiovascular:      Rate and Rhythm: Normal rate and regular rhythm.      Heart sounds: No murmur heard.  Pulmonary:      Effort: Pulmonary effort is normal. No respiratory distress.      Breath sounds: Normal breath sounds. No stridor. No wheezing or rhonchi.   Abdominal:      General: Abdomen is flat. There is no distension.      Palpations: Abdomen is soft. There is no mass.      Tenderness: There is abdominal tenderness. There is no guarding.   Musculoskeletal:      Cervical back: Neck supple.      Right lower leg: No edema.      Left lower leg: No edema.   Skin:     Coloration: Skin is not pale.      Findings: No rash.   Neurological:      Mental Status: He is alert.      Motor: No weakness.     Pain is difficult to reproduce but it is above the level of the groin.  He denies any testicular swelling and there is no obvious inguinal canal fullness.  No obvious hernia.        ED Course                 Procedures              Critical Care time:  none               Results for orders placed or performed during the hospital encounter of 01/10/24 (from the past 24 hour(s))   UA with Microscopic reflex to Culture    Specimen: Urine, Clean Catch   Result Value Ref Range    Color Urine Straw Colorless, Straw, Light Yellow, Yellow    Appearance Urine Clear Clear    Glucose Urine Negative Negative mg/dL    Bilirubin Urine Negative Negative    Ketones Urine Negative Negative mg/dL    Specific Gravity Urine 1.003 1.003 - 1.035    Blood Urine Negative Negative    pH Urine 7.0 5.0 - 7.0    Protein Albumin Urine Negative Negative mg/dL    Urobilinogen Urine Normal Normal, 2.0 mg/dL    Nitrite Urine Negative Negative    Leukocyte Esterase Urine Negative Negative    RBC Urine 0 <=2 /HPF    WBC Urine 0 <=5 /HPF    Narrative    Urine Culture not indicated   Comprehensive metabolic panel    Result Value Ref Range    Sodium 136 135 - 145 mmol/L    Potassium 4.0 3.4 - 5.3 mmol/L    Carbon Dioxide (CO2) 23 22 - 29 mmol/L    Anion Gap 15 7 - 15 mmol/L    Urea Nitrogen 15.8 8.0 - 23.0 mg/dL    Creatinine 1.09 0.67 - 1.17 mg/dL    GFR Estimate 73 >60 mL/min/1.73m2    Calcium 9.3 8.8 - 10.2 mg/dL    Chloride 98 98 - 107 mmol/L    Glucose 96 70 - 99 mg/dL    Alkaline Phosphatase 57 40 - 150 U/L    AST 30 0 - 45 U/L    ALT 27 0 - 70 U/L    Protein Total 8.0 6.4 - 8.3 g/dL    Albumin 4.6 3.5 - 5.2 g/dL    Bilirubin Total 0.8 <=1.2 mg/dL   CBC with platelets differential    Narrative    The following orders were created for panel order CBC with platelets differential.  Procedure                               Abnormality         Status                     ---------                               -----------         ------                     CBC with platelets and d...[[id number]]  Abnormal            Final result                 Please view results for these tests on the individual orders.   Lipase   Result Value Ref Range    Lipase 18 13 - 60 U/L   CBC with platelets and differential   Result Value Ref Range    WBC Count 10.1 4.0 - 11.0 10e3/uL    RBC Count 4.44 4.40 - 5.90 10e6/uL    Hemoglobin 15.0 13.3 - 17.7 g/dL    Hematocrit 43.1 40.0 - 53.0 %    MCV 97 78 - 100 fL    MCH 33.8 (H) 26.5 - 33.0 pg    MCHC 34.8 31.5 - 36.5 g/dL    RDW 13.2 10.0 - 15.0 %    Platelet Count 163 150 - 450 10e3/uL    % Neutrophils 71 %    % Lymphocytes 20 %    % Monocytes 8 %    % Eosinophils 1 %    % Basophils 0 %    % Immature Granulocytes 0 %    NRBCs per 100 WBC 0 <1 /100    Absolute Neutrophils 7.1 1.6 - 8.3 10e3/uL    Absolute Lymphocytes 2.0 0.8 - 5.3 10e3/uL    Absolute Monocytes 0.8 0.0 - 1.3 10e3/uL    Absolute Eosinophils 0.1 0.0 - 0.7 10e3/uL    Absolute Basophils 0.0 0.0 - 0.2 10e3/uL    Absolute Immature Granulocytes 0.0 <=0.4 10e3/uL    Absolute NRBCs 0.0 10e3/uL   CT Abdomen Pelvis w Contrast    Narrative    EXAM:  CT ABDOMEN PELVIS W CONTRAST  LOCATION: [facility]  DATE: 1/10/2024    INDICATION: rlq abd pain acute  COMPARISON: None.  TECHNIQUE: CT scan of the abdomen and pelvis was performed following injection of IV contrast. Multiplanar reformats were obtained. Dose reduction techniques were used.  CONTRAST: 78 ml Isovue 370    FINDINGS:   LOWER CHEST: Normal.    HEPATOBILIARY: Normal.    PANCREAS: Normal.    SPLEEN: Normal.    ADRENAL GLANDS: Normal.    KIDNEYS/BLADDER: Tiny right renal cyst. No stones or hydronephrosis.    BOWEL: Diverticula descending and sigmoid colon. Short segment of wall thickening and inflammation involving the mid sigmoid colon, with stranding in the adjacent pericolonic fat. No evidence for perforation, abscess, or bowel obstruction.    LYMPH NODES: Normal.    VASCULATURE: Unremarkable.    PELVIC ORGANS: Mild prostatic hypertrophy. No free pelvic fluid.    MUSCULOSKELETAL: Hypertrophic changes lower thoracic and lumbar spine.      Impression    IMPRESSION:   1.  Short segment of wall thickening and inflammation mid sigmoid colon could represent acute diverticulitis or epiploic appendagitis. No evidence for complications.       Medications   iopamidol (ISOVUE-370) solution 78 mL (78 mLs Intravenous $Given 1/10/24 1997)   sodium chloride 0.9 % bag 500mL for CT scan flush use (59 mLs As instructed $Given 1/10/24 1700)       Assessments & Plan (with Medical Decision Making)     MDM: Mickey Borden is a 68 year old male presenting with right lower quadrant abdominal pain that appears to be above the level of the inguinal region and scrotum.  Patient had no prior abdominal surgery and CT is indicated to evaluate for appendicitis which is ultimately negative other than possible findings of diverticulitis versus epiploic appendagitis.      We discussed management of these 2 potential conditions.  Even uncomplicated diverticulitis often does not require antibiotics.  I  therefore recommended that he observe for the next 2 to 3 days and if worsening then he can start the Augmentin that I prescribed.  Should he not be worsening and improving then he can observe and not initiate antibiotics.  Follow-up in about a week.  Precautions given for return.  I have reviewed the nursing notes.    I have reviewed the findings, diagnosis, plan and need for follow up with the patient.           Medical Decision Making  The patient's presentation was of moderate complexity (an undiagnosed new problem with uncertain diagnosis).    The patient's evaluation involved:  ordering and/or review of 3+ test(s) in this encounter (see separate area of note for details)    The patient's management necessitated moderate risk (prescription drug management including medications given in the ED).        New Prescriptions    No medications on file       Final diagnoses:   Acute diverticulitis vs  epiploic appendigitis - stay hydrated.  tylenol 650 mg every 6 hours as needed. if persistent symptoms after 2-3 days and not  improving, then start antibiotics.  if straed follow-up clinic        1/10/2024   [facility] EMERGENCY DEPT       Rei Dennis MD  01/10/24 0152

## 2024-01-10 NOTE — DISCHARGE INSTRUCTIONS
ICD-10-CM    1. Acute diverticulitis vs  epiploic appendigitis  K57.92     stay hydrated.  tylenol 650 mg every 6 hours as needed. if persistent symptoms after 2-3 days and not  improving, then start antibiotics.  if straed follow-up clinic and consider repeat colonoscopy at  8-12  weeks after episode

## 2024-01-10 NOTE — ED NOTES
"Pt reports intermittent RLQ abd pain starting this morning, mild nausea throughout the day, and feeling \"backed up\". Pt reports pain is worst with palpation, trying to have a BM, and coughing. Pt reports having 3 small liquid stools today.     "

## 2024-02-05 ENCOUNTER — TELEPHONE (OUTPATIENT)
Dept: OTOLARYNGOLOGY | Facility: CLINIC | Age: 69
End: 2024-02-05
Payer: COMMERCIAL

## 2024-02-05 NOTE — TELEPHONE ENCOUNTER
Left vm message for patient in regards to question about botox authorization. Writer did advise issue has been taken care of. Writers call back number provider on vm. in case of any further questions

## 2024-02-05 NOTE — TELEPHONE ENCOUNTER
[facility] Call Center    Phone Message    May a detailed message be left on voicemail: yes     Reason for Call: Other: Pt called in regards to prior authorization for his botox injection. He received a call from the specialty pharmacy in regards to it and he has some questions. Pt would like a call back from the care team to discuss      Action Taken: Other: [facility]    Travel Screening: Not Applicable

## 2024-02-13 ENCOUNTER — OFFICE VISIT (OUTPATIENT)
Dept: OTOLARYNGOLOGY | Facility: CLINIC | Age: 69
End: 2024-02-13
Payer: COMMERCIAL

## 2024-02-13 DIAGNOSIS — J38.3 OTHER DISEASES OF VOCAL CORDS: ICD-10-CM

## 2024-02-13 DIAGNOSIS — J38.5 LARYNGEAL SPASM: Primary | ICD-10-CM

## 2024-02-13 PROCEDURE — 64617 CHEMODENER MUSCLE LARYNX EMG: CPT | Mod: 50 | Performed by: OTOLARYNGOLOGY

## 2024-02-13 ASSESSMENT — PAIN SCALES - GENERAL: PAINLEVEL: NO PAIN (0)

## 2024-02-13 NOTE — LETTER
2/13/2024       RE: Mickey Borden  [id number]  [place] [id number]     Dear Colleague,    Thank you for referring your patient, Mickey Borden, to the [facility] [place] [facility]. Please see a copy of my visit note below.    Mickey Borden is a 68 year old male with a history of adductor laryngeal dystonia and laryngeal spasm.  he was last injected on 12/12/2023. he had a good response to the last injection. The last dose given was 0.25 units into each thyroarytenoid muscle.  After the injection  he had a breathy dysphonia for 0 weeks.There was no Dysphagia or Dyspnea.  The response lasted for 2 months.   Our plan is to give 0.25 units of Botulinum A toxin into  each thyroarytenoid muscle today.      PROCEDURE: After obtaining consent, the patient was placed in the supine position. Ground and reference electrodes were applied. The anterior neck was cleaned with an alcohol swab.  Using a 27-gauge, unipolar electromyography needle, the larynx was entered through the cricothyroid space.  0.25 units of botulinum A toxin was injected into each thyroarytenoid muscle.  There was a Moderate EMG response to phonation on the left side and a Strong EMG response to phonation on the right side.  The total amount of botulinum A toxin delivered today was 0.5 units. An additional 5 units of botulinum A toxin was necessarily wasted in preparation for the injection. he tolerated the procedure well and left the clinic after a short observation period.         The EMG was necessary specifically in this case to identify areas of muscle overactivity as well as to access the thyroarytenoid muscle which is beneath the thyroid cartilage and is not otherwise directly accessible without EMG guidance.    PLAN: I will have him  follow up on a PRN basis. It is anticipated that repeat injections will be required over the long term.        Again,  thank you for allowing me to participate in the care of your patient.      Sincerely,    Tae Fritz MD

## 2024-02-13 NOTE — PROGRESS NOTES
Mickey Borden is a 68 year old male with a history of adductor laryngeal dystonia and laryngeal spasm.  he was last injected on 12/12/2023. he had a good response to the last injection. The last dose given was 0.25 units into each thyroarytenoid muscle.  After the injection  he had a breathy dysphonia for 0 weeks.There was no Dysphagia or Dyspnea.  The response lasted for 2 months.   Our plan is to give 0.25 units of Botulinum A toxin into  each thyroarytenoid muscle today.      PROCEDURE: After obtaining consent, the patient was placed in the supine position. Ground and reference electrodes were applied. The anterior neck was cleaned with an alcohol swab.  Using a 27-gauge, unipolar electromyography needle, the larynx was entered through the cricothyroid space.  0.25 units of botulinum A toxin was injected into each thyroarytenoid muscle.  There was a Moderate EMG response to phonation on the left side and a Strong EMG response to phonation on the right side.  The total amount of botulinum A toxin delivered today was 0.5 units. An additional 5 units of botulinum A toxin was necessarily wasted in preparation for the injection. he tolerated the procedure well and left the clinic after a short observation period.         The EMG was necessary specifically in this case to identify areas of muscle overactivity as well as to access the thyroarytenoid muscle which is beneath the thyroid cartilage and is not otherwise directly accessible without EMG guidance.    PLAN: I will have him  follow up on a PRN basis. It is anticipated that repeat injections will be required over the long term.

## 2024-02-13 NOTE — PATIENT INSTRUCTIONS
1.  You were seen in the ENT Clinic today by . If you have any questions or concerns after your appointment, please call [phone]. Press option #1 for scheduling related needs. Press option #3 for Nurse advice.    2. Plan is to return to clinic 4/16/24 for repeat botox injection      My Adorno LPN  [phone]  [facility] - Otolaryngology       
Unknown if ever smoked

## 2024-03-13 ENCOUNTER — VIRTUAL VISIT (OUTPATIENT)
Dept: PEDIATRICS | Facility: CLINIC | Age: 69
End: 2024-03-13
Payer: COMMERCIAL

## 2024-03-13 ENCOUNTER — TELEPHONE (OUTPATIENT)
Dept: PEDIATRICS | Facility: CLINIC | Age: 69
End: 2024-03-13

## 2024-03-13 DIAGNOSIS — H10.33 ACUTE CONJUNCTIVITIS OF BOTH EYES, UNSPECIFIED ACUTE CONJUNCTIVITIS TYPE: Primary | ICD-10-CM

## 2024-03-13 PROCEDURE — 99213 OFFICE O/P EST LOW 20 MIN: CPT | Mod: 95 | Performed by: INTERNAL MEDICINE

## 2024-03-13 RX ORDER — POLYMYXIN B SULFATE AND TRIMETHOPRIM 1; 10000 MG/ML; [USP'U]/ML
1-2 SOLUTION OPHTHALMIC 4 TIMES DAILY
Qty: 10 ML | Refills: 0 | Status: SHIPPED | OUTPATIENT
Start: 2024-03-13 | End: 2024-03-20

## 2024-03-13 NOTE — TELEPHONE ENCOUNTER
Spoke with pt he is a hour away and cannot come in for a visit . Pcp say  he can do Gary Nowak on 3/13/2024 at 9:54 AM

## 2024-03-13 NOTE — PROGRESS NOTES
Alexander is a 68 year old who is being evaluated via a billable video visit.        Assessment & Plan       ICD-10-CM    1. Acute conjunctivitis of both eyes, unspecified acute conjunctivitis type  H10.33 polymixin b-trimethoprim (POLYTRIM) 85619-6.1 UNIT/ML-% ophthalmic solution        Right eye is more significantly affected than the left but both eyes have symptoms.  Reviewed treatment options.    Polytrim 1 to 2 drops each eye 4 times daily for 7 days.    Vies that if his symptoms are not fully or at least nearly resolved by Monday that he may contact me.  If that is the case then I will change his prescription to ciprofloxacin eyedrops with the same directions.  If that is not helpful then a visit with an eye specialist would be warranted.    Gallito Barba MD       Subjective   Alexander is a 68 year old, presenting for the following health issues:  Conjunctivitis    Video Start Time: 1:06 PM    HPI     Video visit for pink eye symptoms. Right eye is affected.   Has some sx in the left eye.  Sx ongoing 1 week.  Discharge noted deborah in the am. Difficulty opening the right eye.  Has been treated for conjunctivitis in the past. 6-7 years ago had recurrent sx a few times per year.            Objective           Vitals:  No vitals were obtained today due to virtual visit.    Physical Exam   GEN: No distress  EYES: Right conjunctival erythema.  Mild left conjunctival erythema.        Video-Visit Details    Type of service:  Video Visit   Video End Time:1:17 PM  Originating Location (pt. Location): Home    Distant Location (provider location):  On-site  Platform used for Video Visit: [facility]  Signed Electronically by: Gallito Barba MD

## 2024-03-13 NOTE — TELEPHONE ENCOUNTER
Reason for call:  Medication   If this is a refill request, has the caller requested the refill from the pharmacy already? No  Will the patient be using a [facility]? No  Name of the pharmacy and phone number for the current request: [facility] [phone]    Name of the medication requested: garcia nitrate for pink eye    Other request: asking for refill or new prescription     Phone number to reach patient:  Home number on file [phone] (home)    Best Time:  any    Can we leave a detailed message on this number?  YES    Travel screening: Not Applicable

## 2024-03-20 ENCOUNTER — MYC MEDICAL ADVICE (OUTPATIENT)
Dept: PEDIATRICS | Facility: CLINIC | Age: 69
End: 2024-03-20
Payer: COMMERCIAL

## 2024-04-02 ENCOUNTER — MYC MEDICAL ADVICE (OUTPATIENT)
Dept: PEDIATRICS | Facility: CLINIC | Age: 69
End: 2024-04-02
Payer: COMMERCIAL

## 2024-04-02 DIAGNOSIS — R35.1 BENIGN PROSTATIC HYPERPLASIA WITH NOCTURIA: ICD-10-CM

## 2024-04-02 DIAGNOSIS — N40.1 BENIGN PROSTATIC HYPERPLASIA WITH NOCTURIA: ICD-10-CM

## 2024-04-02 NOTE — TELEPHONE ENCOUNTER
"Sent [employer] message to the pt.  Awaiting pt response and can route to PCP.    -----------------------------------------------------------------------------    Dr Barba: Pt seeking new order for tamsulosin.    Per urology visit (06/30/2024):  Plan:  1. Continue tamsulosin 0.8 mg daily.       - Isaías \"Antelmo\" Cuca (he/him/his), RN - Patient [facility]ocate [facility] (PAL)  [facility]    "

## 2024-04-03 RX ORDER — TAMSULOSIN HYDROCHLORIDE 0.4 MG/1
0.4 CAPSULE ORAL DAILY
Qty: 90 CAPSULE | Refills: 3 | Status: SHIPPED | OUTPATIENT
Start: 2024-04-03

## 2024-04-03 NOTE — TELEPHONE ENCOUNTER
Dr Barba,     See patient .     Urologist had suggested patient go to 1 tablet/day and patient has seen improvement in lightheadedness.     Should patient continue on 1 tab/day? If so, patient needing script changed.      Ksenia LYLES RN on 4/3/2024 at 12:00 PM

## 2024-04-05 DIAGNOSIS — R35.1 BENIGN PROSTATIC HYPERPLASIA WITH NOCTURIA: ICD-10-CM

## 2024-04-05 DIAGNOSIS — N40.1 BENIGN PROSTATIC HYPERPLASIA WITH NOCTURIA: ICD-10-CM

## 2024-04-05 RX ORDER — TAMSULOSIN HYDROCHLORIDE 0.4 MG/1
0.4 CAPSULE ORAL DAILY
Qty: 90 CAPSULE | Refills: 3 | OUTPATIENT
Start: 2024-04-05

## 2024-04-05 NOTE — TELEPHONE ENCOUNTER
Medication Question or Refill        What medication are you calling about (include dose and sig)?: Tamsulosin 0.4 mg     Preferred Pharmacy:           [id number] - [place] - [address]  [address]  [address]  [place] [address]  Phone: [phone] Fax: [phone]       Controlled Substance Agreement on file:   CSA -- Patient Level:    CSA: None found at the patient level.       Who prescribed the medication?: Dr Barba    Do you need a refill? No    When did you use the medication last? unknown    Patient offered an appointment? No    Do you have any questions or concerns?  Yes: Rx was sent for same medication two capsules daily ,and this Rx says one capsule daily. Is this correct? Does patient know if he is supposed to take one per day?       [employer] [phone]

## 2024-04-09 ENCOUNTER — NURSE TRIAGE (OUTPATIENT)
Dept: PEDIATRICS | Facility: CLINIC | Age: 69
End: 2024-04-09
Payer: COMMERCIAL

## 2024-04-09 NOTE — TELEPHONE ENCOUNTER
"Pt calls,     S-(situation): & B-(background): informs has redness in sclera past 24 hours, minimal clear drainage, see triage note, pt also was trimming tree branches yesterday and wonders if that irritated eyes, had virtual visit about 3 weeks ago but symptoms were different, informed was give prescription eye gtts and issue resolved    A-(assessment): rule of allergic conjunctivitis     R-(recommendations): pt agrees to monitor per triage guidelines, may apply cool compresses prn, agrees to follow up if symptoms worsen or persist      Reason for Disposition   Red eyes present > 7 days    Additional Information   Negative: Chemical got in the eye   Negative: Piece of something got in the eye   Negative: Followed an eye injury   Negative: Yellow or green pus in the eyes   Negative: Eyelid is swollen and no redness of white of eye (sclera)   Negative: SEVERE eye pain   Negative: Recent eye surgery and has increasing eye pain   Negative: Patient sounds very sick or weak to the triager   Negative: MODERATE eye pain or discomfort (e.g., interferes with normal activities or awakens from sleep; more than mild)   Negative: Looking at light causes MODERATE to SEVERE eye pain (i.e., photophobia)   Negative: New or worsening blurred vision   Negative: Cloudy spot or sore seen on the cornea (clear part of the eye)   Negative: Eyelids are very swollen (shut or almost)   Negative: Eyelid (outer) is very red   Negative: Vomiting   Negative: Foreign body sensation ('feels like something is in there')   Negative: Patient wants to be seen   Negative: Eye pain present > 24 hours   Negative: Bleeding on white of the eye and is taking Coumadin or known bleeding disorder (e.g., thrombocytopenia)   Negative: Only 1 eye is red, and persists > 48 hours    Answer Assessment - Initial Assessment Questions  1. LOCATION: Location: \"What's red, the eyeball or the outer eyelids?\" (Note: when callers say the eye is red, they usually mean the " "sclera is red)        Eyeball or white part of eye  2. REDNESS OF SCLERA: \"Is the redness in one or both eyes?\" \"When did the redness start?\"       both  3. ONSET: \"When did the eye become red?\" (e.g., hours, days)       yesterday  4. EYELIDS: \"Are the eyelids red or swollen?\" If Yes, ask: \"How much?\"       No  5. VISION: \"Is there any difficulty seeing clearly?\"       No   6. ITCHING: \"Does it feel itchy?\" If so ask: \"How bad is it\" (e.g., Scale 1-10; or mild, moderate, severe)      NO  7. PAIN: \"Is there any pain? If Yes, ask: \"How bad is it?\" (e.g., Scale 1-10; or mild, moderate, severe)      No  8. CONTACT LENS: \"Do you wear contacts?\"      No  9. CAUSE: \"What do you think is causing the redness?\"      Recurrent pink eye  10. OTHER SYMPTOMS: \"Do you have any other symptoms?\" (e.g., fever, runny nose, cough, vomiting)        Slight headache    Protocols used: Eye - Red Without Pus-A-OH    Chelsea Bennett RN, BSN  [facility]    "

## 2024-04-10 NOTE — TELEPHONE ENCOUNTER
RN called and spoke with pharmacy staff relayed provider message. Staff was given an opportunity to ask questions, verbalized understanding of plan, and is agreeable will keep 1 tablet/day script.         RN called and spoke with patient. Informed patient the pharmacy sent his prescription to him for 2 tablets of day, but patient should only take 1 tablet. Patient states he has only been taking 1 tablet for about 10 months, so he will continue this.     Instructed pt to call back if there are any questions. Patient was given an opportunity to ask questions, verbalized understanding of plan, and is agreeable.        Ksenia LYLES RN on 4/10/2024 at 2:45 PM

## 2024-04-10 NOTE — TELEPHONE ENCOUNTER
"[facility] pharmacy calls, routed to PCP, please advise [facility] mail order if pt is to continue tamsulosin 2 capsules OR fill new rx from April for one capsule daily, ROUTED TO INFORM [facility] of plan    ~waiting on earlier message, message \"done\" by refill team, routed to PCP, please confirm/advise, informs:    ~they dispensed prescription for Tamsulosin 0.4 mg 2 capsules daily and has refills though October 2024, they sent this one week ago to pt and pt has SUPPLY  ~now RECEIVED new medication for Tamsulosin 0.4 mg one capsule daily    THEY NEED PCP TO ADVISE IF YOU WANT PT TO CONTINUE current rx they have for 2 capsules daily or DID YOU WANT TO DECREASE TO 1 CAPSULE DAILY    tamsulosin (FLOMAX) 0.4 MG capsule    Route: TAKE 2 CAPSULES DAILY - Oral   10/5/2023 4/3/2024     Chelsea Bennett, RN, BSN  [facility]    "

## 2024-04-10 NOTE — TELEPHONE ENCOUNTER
Please call [facility]:  His dose is 1 capsule per day  If they recently filled 180, he does not need another refill now  Please cancel any remaining refills on the 2 capsule per day prescription and keep the prescription signed 4/3/24 for one daily dosing   normal...

## 2024-04-16 ENCOUNTER — OFFICE VISIT (OUTPATIENT)
Dept: OTOLARYNGOLOGY | Facility: CLINIC | Age: 69
End: 2024-04-16
Payer: COMMERCIAL

## 2024-04-16 VITALS — BODY MASS INDEX: 28.34 KG/M2 | WEIGHT: 160 LBS

## 2024-04-16 DIAGNOSIS — J38.3 OTHER DISEASES OF VOCAL CORDS: ICD-10-CM

## 2024-04-16 DIAGNOSIS — J38.5 LARYNGEAL SPASM: Primary | ICD-10-CM

## 2024-04-16 PROCEDURE — 64617 CHEMODENER MUSCLE LARYNX EMG: CPT | Mod: 50 | Performed by: OTOLARYNGOLOGY

## 2024-04-16 NOTE — NURSING NOTE
Invasive Procedure Safety Checklist  Procedure:  Botox Injection    Responsible person(s):  Complete sections as appropriate and electronically sign and date below.    Staff/Provider  Consent documentation on chart:  YES  H&P is not applicable (when straight local anesthesia is used).    Procedure Team  Completed by comparing informed consent documentation, information on the patient record and/or the marked surgical site, and discussion with the patient/guardian.     Verified:  (Select all that apply)  Patient identification (two indicators)  Procedure to be performed  Procedure site and /or laterality and/or level  Consent  Procedure site:  Site can not be marked due to location.  Provider Kan - Site/Laterality/Level:  No Level or Structure  Staff/Provider:  No images    Procedure Team:  *Pause for the Cause* verbal and active participation of team members- verify:  Patient name:  YES  Procedure to be performed:  YES  Site, laterality and level, noting patient position:  YES    Above steps completed as applicable (Electronic Signature, Title, Date):    Meme Whalen RN on 4/16/2024 at 10:15 AM      Note:  Any incidents of wrong patient, wrong procedure, or wrong site are reported using the Occurrence Process already in place.  The occurrence form is required to be completed immediately with this type of event.

## 2024-04-16 NOTE — LETTER
4/16/2024       RE: Mickey Borden  [id number]  [facility] [address]     Dear Colleague,    Thank you for referring your patient, Mickey Borden, to the [facility] TH[facility] [place] at [facility]. Please see a copy of my visit note below.    Mickey Borden is a 68 year old male with a history of adductor laryngeal dystonia and laryngeal spasm.  he was last injected on 2/13/2024. he had a good response to the last injection. The last dose given was 0.25 units into each thyroarytenoid muscle.  After the injection  he had a breathy dysphonia for 2 weeks.There was no Dysphagia or Dyspnea.  The response lasted for 2 months.  He would like to try a reduced and diluted dosage today.   Our plan is to give  0.20 units toxin diluted at a concentration of 6.25 units per ml, delivering 0.03 ml  units of Botulinum A toxin into  each thyroarytenoid muscle today.       PROCEDURE: After obtaining consent, the patient was placed in the supine position. Ground and reference electrodes were applied. The anterior neck was cleaned with an alcohol swab.  Using a 27-gauge, unipolar electromyography needle, the larynx was entered through the cricothyroid space.  0.2 units of botulinum A toxin was injected into each thyroarytenoid muscle.  There was a Moderate EMG response to phonation on the left side and a Strong EMG response to phonation on the right side.  The total amount of botulinum A toxin delivered today was 0.4 units. An additional 5 units of botulinum A toxin was necessarily wasted in preparation for the injection. he tolerated the procedure well and left the clinic after a short observation period.         The EMG was necessary specifically in this case to identify areas of muscle overactivity as well as to access the thyroarytenoid muscle which is beneath the thyroid cartilage and is not otherwise directly accessible without EMG  guidance.    PLAN: I will have him  follow up on a PRN basis. It is anticipated that repeat injections will be required over the long term.        Again, thank you for allowing me to participate in the care of your patient.      Sincerely,    Tae Fritz MD

## 2024-04-16 NOTE — PATIENT INSTRUCTIONS
1.  You were seen in the ENT Clinic today by . If you have any questions or concerns after your appointment, please call [phone]. Press option #1 for scheduling related needs. Press option #3 for Nurse advice.    2.  Plan is to return to clinic 6/25 at 8:15 AM      How to Contact Us:  Send a Laiyaoyao message to your provider. Our team will respond to you via Laiyaoyao. Occasionally, we will need to call you to get further information.  For urgent matters (Monday-Friday, 8:00 AM-3:30 PM), call the ENT Clinic: [phone] and speak with a call center team member - they will route your call appropriately.   If you'd like to speak directly with a nurse, please call [phone]. We do our best to check voicemail frequently throughout the day, and will work to call you back within 1-2 days. For urgent matters, please use the general clinic phone numbers listed above.    Meme Whalen RN  [phone]  [facility] - Otolaryngology

## 2024-04-16 NOTE — PROGRESS NOTES
Mickey Borden is a 68 year old male with a history of adductor laryngeal dystonia and laryngeal spasm.  he was last injected on 2/13/2024. he had a good response to the last injection. The last dose given was 0.25 units into each thyroarytenoid muscle.  After the injection  he had a breathy dysphonia for 2 weeks.There was no Dysphagia or Dyspnea.  The response lasted for 2 months.  He would like to try a reduced and diluted dosage today.   Our plan is to give  0.20 units toxin diluted at a concentration of 6.25 units per ml, delivering 0.03 ml  units of Botulinum A toxin into  each thyroarytenoid muscle today.       PROCEDURE: After obtaining consent, the patient was placed in the supine position. Ground and reference electrodes were applied. The anterior neck was cleaned with an alcohol swab.  Using a 27-gauge, unipolar electromyography needle, the larynx was entered through the cricothyroid space.  0.2 units of botulinum A toxin was injected into each thyroarytenoid muscle.  There was a Moderate EMG response to phonation on the left side and a Strong EMG response to phonation on the right side.  The total amount of botulinum A toxin delivered today was 0.4 units. An additional 5 units of botulinum A toxin was necessarily wasted in preparation for the injection. he tolerated the procedure well and left the clinic after a short observation period.         The EMG was necessary specifically in this case to identify areas of muscle overactivity as well as to access the thyroarytenoid muscle which is beneath the thyroid cartilage and is not otherwise directly accessible without EMG guidance.    PLAN: I will have him  follow up on a PRN basis. It is anticipated that repeat injections will be required over the long term.

## 2024-06-25 ENCOUNTER — OFFICE VISIT (OUTPATIENT)
Dept: OTOLARYNGOLOGY | Facility: CLINIC | Age: 69
End: 2024-06-25
Payer: COMMERCIAL

## 2024-06-25 VITALS — WEIGHT: 153 LBS | BODY MASS INDEX: 28.16 KG/M2 | HEIGHT: 62 IN

## 2024-06-25 DIAGNOSIS — J38.3 OTHER DISEASES OF VOCAL CORDS: ICD-10-CM

## 2024-06-25 DIAGNOSIS — J38.5 LARYNGEAL SPASM: Primary | ICD-10-CM

## 2024-06-25 PROCEDURE — 64617 CHEMODENER MUSCLE LARYNX EMG: CPT | Mod: 50 | Performed by: OTOLARYNGOLOGY

## 2024-06-25 NOTE — PATIENT INSTRUCTIONS
1.  You were seen in the ENT Clinic today by . If you have any questions or concerns after your appointment, please call [phone]. Press option #1 for scheduling related needs. Press option #3 for Nurse advice.    2. Plan is to return to clinic 8/27/24 for repeat botox injection      My Adorno LPN  [phone]  [facility] - Otolaryngology

## 2024-06-25 NOTE — LETTER
6/25/2024       RE: Mickey Borden  [id number] [facility] [address]     Dear Colleague,    Thank you for referring your patient, Mickey Borden, to the [facility] EAR NOSE AND THROAT CLINIC [place] at [facility]. Please see a copy of my visit note below.    Mickey Borden is a 68 year old male with a history of adductor laryngeal dystonia and laryngeal spasm.  he was last injected on 4/16/2024. he had a good response to the last injection. The last dose given was 0.2 units at a diluted concentration into each thyroarytenoid muscle.  After the injection  he had a breathy dysphonia for 0 weeks.There was no Dysphagia or Dyspnea.  The response lasted for 1.5 months.  He likes the slightly longer duration with the higher previous doses that we have had.  Our plan is to give 0.25 units of Botulinum A toxin into  each thyroarytenoid muscle today.      PROCEDURE: After obtaining consent, the patient was placed in the supine position. Ground and reference electrodes were applied. The anterior neck was cleaned with an alcohol swab.  Using a 27-gauge, unipolar electromyography needle, the larynx was entered through the cricothyroid space.  0.25 units of botulinum A toxin was injected into each thyroarytenoid muscle.  There was a Strong EMG response to phonation on the left side and a Moderate EMG response to phonation on the right side.  The total amount of botulinum A toxin delivered today was 0.5 units. An additional 99.5 units of botulinum A toxin was necessarily wasted in preparation for the injection. he tolerated the procedure well and left the clinic after a short observation period.         The EMG was necessary specifically in this case to identify areas of muscle overactivity as well as to access the thyroarytenoid muscle which is beneath the thyroid cartilage and is not otherwise directly accessible without EMG guidance.    PLAN: I will have him   follow up on a PRN basis. It is anticipated that repeat injections will be required over the long term.         Again, thank you for allowing me to participate in the care of your patient.      Sincerely,    Tae Fritz MD

## 2024-06-25 NOTE — PROGRESS NOTES
Mickey Borden is a 68 year old male with a history of adductor laryngeal dystonia and laryngeal spasm.  he was last injected on 4/16/2024. he had a good response to the last injection. The last dose given was 0.2 units at a diluted concentration into each thyroarytenoid muscle.  After the injection  he had a breathy dysphonia for 0 weeks.There was no Dysphagia or Dyspnea.  The response lasted for 1.5 months.  He likes the slightly longer duration with the higher previous doses that we have had.  Our plan is to give 0.25 units of Botulinum A toxin into  each thyroarytenoid muscle today.      PROCEDURE: After obtaining consent, the patient was placed in the supine position. Ground and reference electrodes were applied. The anterior neck was cleaned with an alcohol swab.  Using a 27-gauge, unipolar electromyography needle, the larynx was entered through the cricothyroid space.  0.25 units of botulinum A toxin was injected into each thyroarytenoid muscle.  There was a Strong EMG response to phonation on the left side and a Moderate EMG response to phonation on the right side.  The total amount of botulinum A toxin delivered today was 0.5 units. An additional 99.5 units of botulinum A toxin was necessarily wasted in preparation for the injection. he tolerated the procedure well and left the clinic after a short observation period.         The EMG was necessary specifically in this case to identify areas of muscle overactivity as well as to access the thyroarytenoid muscle which is beneath the thyroid cartilage and is not otherwise directly accessible without EMG guidance.    PLAN: I will have him  follow up on a PRN basis. It is anticipated that repeat injections will be required over the long term.

## 2024-07-17 DIAGNOSIS — J38.5 LARYNGEAL SPASM: Primary | ICD-10-CM

## 2024-07-20 ENCOUNTER — MYC MEDICAL ADVICE (OUTPATIENT)
Dept: PEDIATRICS | Facility: CLINIC | Age: 69
End: 2024-07-20
Payer: COMMERCIAL

## 2024-08-27 ENCOUNTER — OFFICE VISIT (OUTPATIENT)
Dept: OTOLARYNGOLOGY | Facility: CLINIC | Age: 69
End: 2024-08-27
Payer: COMMERCIAL

## 2024-08-27 DIAGNOSIS — J38.5 LARYNGEAL SPASM: Primary | ICD-10-CM

## 2024-08-27 DIAGNOSIS — J38.3 OTHER DISEASES OF VOCAL CORDS: ICD-10-CM

## 2024-08-27 PROCEDURE — 64617 CHEMODENER MUSCLE LARYNX EMG: CPT | Mod: 50 | Performed by: OTOLARYNGOLOGY

## 2024-08-27 NOTE — PROGRESS NOTES
Invasive Procedure Safety Checklist  Procedure:  botox    Responsible person(s):  Complete sections as appropriate and electronically sign and date below.    Staff/Provider  Consent documentation on chart:  YES  H&P is not applicable (when straight local anesthesia is used).    Procedure Team  Completed by comparing informed consent documentation, information on the patient record and/or the marked surgical site, and discussion with the patient/guardian.     Verified:  (Select all that apply)  Patient identification (two indicators)  Procedure to be performed  Procedure site and /or laterality and/or level  Consent  Procedure site:  Site marking not requred.  Provider Kan - Site/Laterality/Level:  Bilateral  Staff/Provider:  No images    Procedure Team:  *Pause for the Cause* verbal and active participation of team members- verify:  Patient name:  YES  Procedure to be performed:  YES  Site, laterality and level, noting patient position:  YES    Above steps completed as applicable (Electronic Signature, Title, Date):    Aisha Edgar RN 8/27/24    Note:  Any incidents of wrong patient, wrong procedure, or wrong site are reported using the Occurrence Process already in place.  The occurrence form is required to be completed immediately with this type of event.

## 2024-08-27 NOTE — PROGRESS NOTES
Mickey Borden is a 68 year old male with a history of adductor laryngeal dystonia and laryngeal spasm.  he was last injected on 6/25/2024. he had a good response to the last injection. The last dose given was 0.25 units into each thyroarytenoid muscle.  After the injection  he had a breathy dysphonia for 1 week. There was no Dysphagia or Dyspnea.  The response lasted for 2 months.   Our plan is to give 0.25 units of Botulinum A toxin into  each thyroarytenoid muscle today.      PROCEDURE: After obtaining consent, the patient was placed in the supine position. Ground and reference electrodes were applied. The anterior neck was cleaned with an alcohol swab.  Using a 25-gauge, unipolar electromyography needle, the larynx was entered through the cricothyroid space.  0.25 units of botulinum A toxin was injected into each thyroarytenoid muscle.  There was a Strong EMG response to phonation on the left side and a Strong EMG response to phonation on the right side.  The total amount of botulinum A toxin delivered today was 0.5 units. An additional 99.5 units of botulinum A toxin was necessarily wasted in preparation for the injection. he tolerated the procedure well and left the clinic after a short observation period.         The EMG was necessary specifically in this case to identify areas of muscle overactivity as well as to access the thyroarytenoid muscle which is beneath the thyroid cartilage and is not otherwise directly accessible without EMG guidance.    PLAN: I will have her  follow up on a PRN basis. It is anticipated that repeat injections will be required over the long term.

## 2024-08-27 NOTE — LETTER
8/27/2024       RE: Mickey Borden  [id number]  [facility] [place] [facility] [address]     Dear Colleague,    Thank you for referring your patient, Mickey Borden, to the [facility] EAR NOSE AND THROAT CLINIC [place] at [facility]. Please see a copy of my visit note below.    Mickey Borden is a 68 year old male with a history of adductor laryngeal dystonia and laryngeal spasm.  he was last injected on 6/25/2024. he had a good response to the last injection. The last dose given was 0.25 units into each thyroarytenoid muscle.  After the injection  he had a breathy dysphonia for 1 week. There was no Dysphagia or Dyspnea.  The response lasted for 2 months.   Our plan is to give 0.25 units of Botulinum A toxin into  each thyroarytenoid muscle today.      PROCEDURE: After obtaining consent, the patient was placed in the supine position. Ground and reference electrodes were applied. The anterior neck was cleaned with an alcohol swab.  Using a 25-gauge, unipolar electromyography needle, the larynx was entered through the cricothyroid space.  0.25 units of botulinum A toxin was injected into each thyroarytenoid muscle.  There was a Strong EMG response to phonation on the left side and a Strong EMG response to phonation on the right side.  The total amount of botulinum A toxin delivered today was 0.5 units. An additional 99.5 units of botulinum A toxin was necessarily wasted in preparation for the injection. he tolerated the procedure well and left the clinic after a short observation period.         The EMG was necessary specifically in this case to identify areas of muscle overactivity as well as to access the thyroarytenoid muscle which is beneath the thyroid cartilage and is not otherwise directly accessible without EMG guidance.    PLAN: I will have her  follow up on a PRN basis. It is anticipated that repeat injections will be required over the long  term.        Invasive Procedure Safety Checklist  Procedure:  botox    Responsible person(s):  Complete sections as appropriate and electronically sign and date below.    Staff/Provider  Consent documentation on chart:  YES  H&P is not applicable (when straight local anesthesia is used).    Procedure Team  Completed by comparing informed consent documentation, information on the patient record and/or the marked surgical site, and discussion with the patient/guardian.     Verified:  (Select all that apply)  Patient identification (two indicators)  Procedure to be performed  Procedure site and /or laterality and/or level  Consent  Procedure site:  Site marking not requred.  Provider Kan - Site/Laterality/Level:  Bilateral  Staff/Provider:  No images    Procedure Team:  *Pause for the Cause* verbal and active participation of team members- verify:  Patient name:  YES  Procedure to be performed:  YES  Site, laterality and level, noting patient position:  YES    Above steps completed as applicable (Electronic Signature, Title, Date):    Aisha Edgar RN 8/27/24    Note:  Any incidents of wrong patient, wrong procedure, or wrong site are reported using the Occurrence Process already in place.  The occurrence form is required to be completed immediately with this type of event.      Again, thank you for allowing me to participate in the care of your patient.      Sincerely,    Tae Fritz MD

## 2024-09-11 ENCOUNTER — PATIENT OUTREACH (OUTPATIENT)
Dept: CARE COORDINATION | Facility: CLINIC | Age: 69
End: 2024-09-11
Payer: COMMERCIAL

## 2024-09-25 ENCOUNTER — PATIENT OUTREACH (OUTPATIENT)
Dept: CARE COORDINATION | Facility: CLINIC | Age: 69
End: 2024-09-25
Payer: COMMERCIAL

## 2024-09-30 ENCOUNTER — TRANSFERRED RECORDS (OUTPATIENT)
Dept: HEALTH INFORMATION MANAGEMENT | Facility: CLINIC | Age: 69
End: 2024-09-30
Payer: COMMERCIAL

## 2024-10-29 ENCOUNTER — OFFICE VISIT (OUTPATIENT)
Dept: OTOLARYNGOLOGY | Facility: CLINIC | Age: 69
End: 2024-10-29
Payer: COMMERCIAL

## 2024-10-29 DIAGNOSIS — J38.3 OTHER DISEASES OF VOCAL CORDS: ICD-10-CM

## 2024-10-29 DIAGNOSIS — J38.5 LARYNGEAL SPASM: Primary | ICD-10-CM

## 2024-10-29 PROCEDURE — 64617 CHEMODENER MUSCLE LARYNX EMG: CPT | Mod: 50 | Performed by: OTOLARYNGOLOGY

## 2024-10-29 NOTE — PROGRESS NOTES
Mickey Borden is a 68 year old male with a history of adductor laryngeal dystonia and laryngeal spasm.  he was last injected on 8/27/2024. he had a good response to the last injection. The last dose given was 0.25 units into each thyroarytenoid muscle.  After the injection  he had a breathy dysphonia for 0.5 weeks.There was no Dysphagia or Dyspnea.  The response lasted for 2 months.   Our plan is to give 0.25 units of Botulinum A toxin into  each thyroarytenoid muscle today.      PROCEDURE: After obtaining consent, the patient was placed in the supine position. Ground and reference electrodes were applied. The anterior neck was cleaned with an alcohol swab.  Using a 27-gauge, unipolar electromyography needle, the larynx was entered through the cricothyroid space.  0.25 units of botulinum A toxin was injected into each thyroarytenoid muscle.  There was a Strong EMG response to phonation on the left side and a Moderate EMG response to phonation on the right side.  The total amount of botulinum A toxin delivered today was 0.5 units. An additional 99.5 units of botulinum A toxin was necessarily wasted in preparation for the injection. he tolerated the procedure well and left the clinic after a short observation period.         The EMG was necessary specifically in this case to identify areas of muscle overactivity as well as to access the thyroarytenoid muscle which is beneath the thyroid cartilage and is not otherwise directly accessible without EMG guidance.    PLAN: I will have him  follow up on a PRN basis. It is anticipated that repeat injections will be required over the long term.

## 2024-10-29 NOTE — NURSING NOTE
Invasive Procedure Safety Checklist  Procedure:  Botox    Responsible person(s):  Complete sections as appropriate and electronically sign and date below.    Staff/Provider  Consent documentation on chart:  YES  H&P is not applicable (when straight local anesthesia is used).    Procedure Team  Completed by comparing informed consent documentation, information on the patient record and/or the marked surgical site, and discussion with the patient/guardian.     Verified:  (Select all that apply)  Patient identification (two indicators)  Procedure to be performed  Procedure site and /or laterality and/or level  Consent  Procedure site:  Site can not be marked due to location.  Provider Kan - Site/Laterality/Level:  No Level or Structure  Staff/Provider:  No images    Procedure Team:  *Pause for the Cause* verbal and active participation of team members- verify:  Patient name:  YES  Procedure to be performed:  YES  Site, laterality and level, noting patient position:  YES    Above steps completed as applicable (Electronic Signature, Title, Date):    CELIA LAW LPN on 10/29/2024 at 1:35 PM      Note:  Any incidents of wrong patient, wrong procedure, or wrong site are reported using the Occurrence Process already in place.  The occurrence form is required to be completed immediately with this type of event.

## 2024-10-29 NOTE — LETTER
10/29/2024       RE: Mickey Borden  [id number]  [facility] [address]     Dear Colleague,    Thank you for referring your patient, Mickey Borden, to the [facility] EAR NOSE AND THROAT CLINIC [place] at [facility]. Please see a copy of my visit note below.    Mickey Borden is a 68 year old male with a history of adductor laryngeal dystonia and laryngeal spasm.  he was last injected on 8/27/2024. he had a good response to the last injection. The last dose given was 0.25 units into each thyroarytenoid muscle.  After the injection  he had a breathy dysphonia for 0.5 weeks.There was no Dysphagia or Dyspnea.  The response lasted for 2 months.   Our plan is to give 0.25 units of Botulinum A toxin into  each thyroarytenoid muscle today.    PROCEDURE: After obtaining consent, the patient was placed in the supine position. Ground and reference electrodes were applied. The anterior neck was cleaned with an alcohol swab.  Using a 27-gauge, unipolar electromyography needle, the larynx was entered through the cricothyroid space.  0.25 units of botulinum A toxin was injected into each thyroarytenoid muscle.  There was a Strong EMG response to phonation on the left side and a Moderate EMG response to phonation on the right side.  The total amount of botulinum A toxin delivered today was 0.5 units. An additional 99.5 units of botulinum A toxin was necessarily wasted in preparation for the injection. he tolerated the procedure well and left the clinic after a short observation period.       The EMG was necessary specifically in this case to identify areas of muscle overactivity as well as to access the thyroarytenoid muscle which is beneath the thyroid cartilage and is not otherwise directly accessible without EMG guidance.    PLAN: I will have him  follow up on a PRN basis. It is anticipated that repeat injections will be required over the long term.    Again,  thank you for allowing me to participate in the care of your patient.      Sincerely,    Tae Fritz MD

## 2024-10-29 NOTE — PATIENT INSTRUCTIONS
1.  You were seen in the ENT Clinic today by . If you have any questions or concerns after your appointment, please call [phone]. Press option #1 for scheduling related needs. Press option #3 for Nurse advice.     2. Plan is to return to clinic 1/14/2025 for repeat botox injection.        My Adorno LPN  [phone]  [facility] - Otolaryngology

## 2024-11-12 ENCOUNTER — OFFICE VISIT (OUTPATIENT)
Dept: PEDIATRICS | Facility: CLINIC | Age: 69
End: 2024-11-12
Payer: COMMERCIAL

## 2024-11-12 VITALS
RESPIRATION RATE: 16 BRPM | DIASTOLIC BLOOD PRESSURE: 64 MMHG | TEMPERATURE: 98 F | SYSTOLIC BLOOD PRESSURE: 134 MMHG | HEART RATE: 58 BPM | BODY MASS INDEX: 29.7 KG/M2 | WEIGHT: 161.4 LBS | OXYGEN SATURATION: 97 % | HEIGHT: 62 IN

## 2024-11-12 DIAGNOSIS — Z23 NEED FOR VACCINATION: ICD-10-CM

## 2024-11-12 DIAGNOSIS — I10 BENIGN ESSENTIAL HYPERTENSION: ICD-10-CM

## 2024-11-12 DIAGNOSIS — Z00.00 ANNUAL WELLNESS VISIT: Primary | ICD-10-CM

## 2024-11-12 DIAGNOSIS — N40.1 BENIGN PROSTATIC HYPERPLASIA WITH NOCTURIA: ICD-10-CM

## 2024-11-12 DIAGNOSIS — E78.5 HYPERLIPIDEMIA LDL GOAL <130: ICD-10-CM

## 2024-11-12 DIAGNOSIS — R35.1 BENIGN PROSTATIC HYPERPLASIA WITH NOCTURIA: ICD-10-CM

## 2024-11-12 PROCEDURE — G0008 ADMIN INFLUENZA VIRUS VAC: HCPCS | Performed by: INTERNAL MEDICINE

## 2024-11-12 PROCEDURE — G0439 PPPS, SUBSEQ VISIT: HCPCS | Performed by: INTERNAL MEDICINE

## 2024-11-12 PROCEDURE — 90662 IIV NO PRSV INCREASED AG IM: CPT | Performed by: INTERNAL MEDICINE

## 2024-11-12 RX ORDER — DILTIAZEM HYDROCHLORIDE 180 MG/1
180 CAPSULE, EXTENDED RELEASE ORAL DAILY
Qty: 90 CAPSULE | Refills: 4 | Status: SHIPPED | OUTPATIENT
Start: 2024-11-12

## 2024-11-12 RX ORDER — LISINOPRIL 20 MG/1
20 TABLET ORAL DAILY
Qty: 90 TABLET | Refills: 4 | Status: SHIPPED | OUTPATIENT
Start: 2024-11-12

## 2024-11-12 RX ORDER — TAMSULOSIN HYDROCHLORIDE 0.4 MG/1
0.4 CAPSULE ORAL DAILY
Qty: 90 CAPSULE | Refills: 4 | Status: SHIPPED | OUTPATIENT
Start: 2024-11-12

## 2024-11-12 SDOH — HEALTH STABILITY: PHYSICAL HEALTH: ON AVERAGE, HOW MANY DAYS PER WEEK DO YOU ENGAGE IN MODERATE TO STRENUOUS EXERCISE (LIKE A BRISK WALK)?: 6 DAYS

## 2024-11-12 ASSESSMENT — PAIN SCALES - GENERAL: PAINLEVEL_OUTOF10: NO PAIN (0)

## 2024-11-12 ASSESSMENT — SOCIAL DETERMINANTS OF HEALTH (SDOH): HOW OFTEN DO YOU GET TOGETHER WITH FRIENDS OR RELATIVES?: TWICE A WEEK

## 2024-11-12 NOTE — PROGRESS NOTES
"Preventive Care Visit  [facility]  Gallito Barba MD, Internal Medicine - Pediatrics  Nov 12, 2024      Assessment & Plan       ICD-10-CM    1. Annual wellness visit  Z00.00       2. Hyperlipidemia LDL goal <130  E78.5       3. Benign prostatic hyperplasia with nocturia  N40.1 tamsulosin (FLOMAX) 0.4 MG capsule    R35.1       4. Benign essential hypertension  I10 lisinopril (ZESTRIL) 20 MG tablet     diltiazem ER (DILT-XR) 180 MG 24 hr capsule      5. Need for vaccination  Z23 INFLUENZA HIGH DOSE, TRIVALENT, PF (FLUZONE)        Overall he is feeling well.  We reviewed routine health issues.  Vaccine updated for influenza.    We also reviewed his chronic medical issues.  Refilled his prescriptions for blood pressure and BPH.        BMI  Estimated body mass index is 29.7 kg/m  as calculated from the following:    Height as of this encounter: 1.57 m (5' 1.81\").    Weight as of this encounter: 73.2 kg (161 lb 6.4 oz).       Counseling  Appropriate preventive services were addressed with this patient via screening, questionnaire, or discussion as appropriate for fall prevention, nutrition, physical activity, social engagement, weight loss and cognition.  Checklist reviewing preventive services available has been given to the patient.  Reviewed patient's diet, addressing concerns and/or questions.     Gallito Barba MD       Andres Munoz is a 68 year old, presenting for the following:  Wellness Visit        11/12/2024     1:40 PM   Additional Questions   Roomed by Tiana Lopez       HPI  Alexander is here for his AWV.  Routine health maintenance was reviewed with him today.    We also reviewed his chronic health history.    Hypertension.  No active cardiac symptoms.  BP Readings from Last 3 Encounters:   11/12/24 134/64   01/10/24 134/80   01/10/24 (!) 147/80     Hyperlipidemia.  We reviewed his recent lipid results.  Lab Results   Component Value Date     10/04/2023     12/23/2020    LDL " 138 08/20/2018     BPH. Reviewed meds.         11/12/2024   General Health   How would you rate your overall physical health? Excellent   Feel stress (tense, anxious, or unable to sleep) Not at all            11/12/2024   Nutrition   Diet: Low fat/cholesterol            11/12/2024   Exercise   Days per week of moderate/strenous exercise 6 days            11/12/2024   Social Factors   Frequency of gathering with friends or relatives Twice a week   Worry food won't last until get money to buy more No   Food not last or not have enough money for food? No   Do you have housing? (Housing is defined as stable permanent housing and does not include staying ouside in a car, in a tent, in an abandoned building, in an overnight shelter, or couch-surfing.) Yes   Are you worried about losing your housing? No   Lack of transportation? No   Unable to get utilities (heat,electricity)? No            11/12/2024   Fall Risk   Fallen 2 or more times in the past year? No     No    Trouble with walking or balance? No     No        Patient-reported    Multiple values from one day are sorted in reverse-chronological order          11/12/2024   Activities of Daily Living- Home Safety   Needs help with the following daily activites None of the above   Safety concerns in the home None of the above            11/12/2024   Dental   Dentist two times every year? Yes            11/12/2024   Hearing Screening   Hearing concerns? None of the above            11/12/2024   Driving Risk Screening   Patient/family members have concerns about driving No            11/12/2024   General Alertness/Fatigue Screening   Have you been more tired than usual lately? No            11/12/2024   Urinary Incontinence Screening   Bothered by leaking urine in past 6 months No            11/12/2024   TB Screening   Were you born outside of the [place]? No        Today's PHQ-2 Score:       11/12/2024     1:30 PM   PHQ-2 ( 1999 Pf[employer])   Q1: Little interest or pleasure in  doing things 0    Q2: Feeling down, depressed or hopeless 0    PHQ-2 Score 0    Q1: Little interest or pleasure in doing things Not at all   Q2: Feeling down, depressed or hopeless Not at all   PHQ-2 Score 0       Patient-reported           11/12/2024   Substance Use   Alcohol more than 3/day or more than 7/wk No   Do you have a current opioid prescription? No   How severe/bad is pain from 1 to 10? 0/10 (No Pain)   Do you use any other substances recreationally? No        Social History     Tobacco Use    Smoking status: Never    Smokeless tobacco: Never   Substance Use Topics    Alcohol use: Yes     Comment: moderate    Drug use: No           11/12/2024   AAA Screening   Family history of Abdominal Aortic Aneurysm (AAA)? No      Last PSA:   PSA   Date Value Ref Range Status   12/23/2020 0.67 0 - 4 ug/L Final     Comment:     Assay Method:  Chemiluminescence using Siemens Vista analyzer     Prostate Specific Antigen Screen   Date Value Ref Range Status   10/04/2023 0.49 0.00 - 4.50 ng/mL Final     ASCVD Risk   The 10-year ASCVD risk score (Alexa DHILLON, et al., 2019) is: 19%    Values used to calculate the score:      Age: 68 years      Sex: Male      Is Non- : No      Diabetic: No      Tobacco smoker: No      Systolic Blood Pressure: 134 mmHg      Is BP treated: Yes      HDL Cholesterol: 60 mg/dL      Total Cholesterol: 193 mg/dL    Current providers sharing in care for this patient include:  Patient Care Team:  Gallito Barba MD as PCP - General (Internal Medicine)  Chelsea Bautista RN as Registered Nurse (Otolaryngology)  Tae Fritz MD as MD (Otolaryngology)  Gallito Barba MD as Assigned PCP  Tae Fritz MD as Assigned Surgical Provider  Casey Wolfe MD as Resident (Otolaryngology)  Yenifer Olivares PA-C as Physician Assistant (Urology)    The following health maintenance items are reviewed in Epic and correct as of today:  Health Maintenance  "  Topic Date Due    ANNUAL REVIEW OF HM ORDERS  Never done    INFLUENZA VACCINE (1) 09/01/2024    LIPID  10/04/2024    MEDICARE ANNUAL WELLNESS VISIT  10/11/2024    BMP  01/10/2025    COLORECTAL CANCER SCREENING  10/26/2025    FALL RISK ASSESSMENT  11/12/2025    GLUCOSE  01/10/2027    DTAP/TDAP/TD IMMUNIZATION (4 - Td or Tdap) 11/29/2027    ADVANCE CARE PLANNING  10/11/2028    RSV VACCINE (1 - 1-dose 75+ series) 12/05/2029    HEPATITIS C SCREENING  Completed    PHQ-2 (once per calendar year)  Completed    Pneumococcal Vaccine: 65+ Years  Completed    ZOSTER IMMUNIZATION  Completed    HPV IMMUNIZATION  Aged Out    MENINGITIS IMMUNIZATION  Aged Out    RSV MONOCLONAL ANTIBODY  Aged Out    COVID-19 Vaccine  Discontinued            Objective    Exam  /64   Pulse 58   Temp 98  F (36.7  C) (Temporal)   Resp 16   Ht 1.57 m (5' 1.81\")   Wt 73.2 kg (161 lb 6.4 oz)   SpO2 97%   BMI 29.70 kg/m     Estimated body mass index is 29.7 kg/m  as calculated from the following:    Height as of this encounter: 1.57 m (5' 1.81\").    Weight as of this encounter: 73.2 kg (161 lb 6.4 oz).    Physical Exam  GENERAL: healthy, alert and no distress  EYES: PERRL, EOMI  HENT: ear canals and TM's normal. No nasal discharge. OP moist.  NECK: no adenopathy  RESP: lungs clear to auscultation - no rales, rhonchi or wheezes  CV: regular rate and rhythm, normal S1 S2, no murmur, no peripheral edema  ABDOMEN: soft, nontender, bowel sounds normal  MS: no gross musculoskeletal defects noted  SKIN: no suspicious lesions or rashes  NEURO: Normal strength and tone  PSYCH: mentation appears normal, affect normal          11/12/2024   Mini Cog   Clock Draw Score 2 Normal   3 Item Recall 3 objects recalled   Mini Cog Total Score 5                 Signed Electronically by: Gallito Barba MD    "

## 2025-01-14 ENCOUNTER — OFFICE VISIT (OUTPATIENT)
Dept: OTOLARYNGOLOGY | Facility: CLINIC | Age: 70
End: 2025-01-14
Payer: COMMERCIAL

## 2025-01-14 DIAGNOSIS — J38.3 OTHER DISEASES OF VOCAL CORDS: ICD-10-CM

## 2025-01-14 DIAGNOSIS — J38.5 LARYNGEAL SPASM: Primary | ICD-10-CM

## 2025-01-14 PROCEDURE — 64617 CHEMODENER MUSCLE LARYNX EMG: CPT | Mod: 50 | Performed by: OTOLARYNGOLOGY

## 2025-01-14 NOTE — PATIENT INSTRUCTIONS
1.  You were seen in the ENT Clinic today by . If you have any questions or concerns after your appointment, please call [phone]. Press option #1 for scheduling related needs. Press option #3 for Nurse advice.    2.  Plan is to return to clinic 4/8/25 for repeat botox injection      My Adorno LPN  [phone]  [facility] - Otolaryngology

## 2025-01-14 NOTE — LETTER
1/14/2025       RE: Mickey Borden  [id number]  [facility] [address]       Dear Colleague,    Thank you for referring your patient, Mickey Borden, to the [facility] EAR NOSE AND THROAT CLINIC [place] [facility]. Please see a copy of my visit note below.    Mickey Borden is a 69 year old male with a history of adductor laryngeal dystonia and laryngeal spasm.  he was last injected on 10/29/2024. he had a good response to the last injection. The last dose given was 0.25 units into each thyroarytenoid muscle.  After the injection  he had a breathy dysphonia for 0 weeks.There was no Dysphagia or Dyspnea.  The response lasted for 2 months.   Our plan is to give 0.25 units of Botulinum A toxin into  each thyroarytenoid muscle today.    PROCEDURE: After obtaining consent, the patient was placed in the supine position. Ground and reference electrodes were applied. The anterior neck was cleaned with an alcohol swab.  Using a 27-gauge, unipolar electromyography needle, the larynx was entered through the cricothyroid space.  0.25 units of botulinum A toxin was injected into each thyroarytenoid muscle.  There was a Moderate EMG response to phonation on the left side and a Strong EMG response to phonation on the right side.  A second pass was required to get a strong EMG on the right side.  The total amount of botulinum A toxin delivered today was 0.5 units. An additional 99.5 units of botulinum A toxin was necessarily wasted in preparation for the injection. he tolerated the procedure well and left the clinic after a short observation period.       The EMG was necessary specifically in this case to identify areas of muscle overactivity as well as to access the thyroarytenoid muscle which is beneath the thyroid cartilage and is not otherwise directly accessible without EMG guidance.    PLAN: I will have him  follow up on a PRN basis. It is anticipated that  repeat injections will be required over the long term.            Again, thank you for allowing me to participate in the care of your patient.      Sincerely,    Tae Fritz MD

## 2025-01-14 NOTE — PROGRESS NOTES
Mickey Borden is a 69 year old male with a history of adductor laryngeal dystonia and laryngeal spasm.  he was last injected on 10/29/2024. he had a good response to the last injection. The last dose given was 0.25 units into each thyroarytenoid muscle.  After the injection  he had a breathy dysphonia for 0 weeks.There was no Dysphagia or Dyspnea.  The response lasted for 2 months.   Our plan is to give 0.25 units of Botulinum A toxin into  each thyroarytenoid muscle today.      PROCEDURE: After obtaining consent, the patient was placed in the supine position. Ground and reference electrodes were applied. The anterior neck was cleaned with an alcohol swab.  Using a 27-gauge, unipolar electromyography needle, the larynx was entered through the cricothyroid space.  0.25 units of botulinum A toxin was injected into each thyroarytenoid muscle.  There was a Moderate EMG response to phonation on the left side and a Strong EMG response to phonation on the right side.  A second pass was required to get a strong EMG on the right side.  The total amount of botulinum A toxin delivered today was 0.5 units. An additional 99.5 units of botulinum A toxin was necessarily wasted in preparation for the injection. he tolerated the procedure well and left the clinic after a short observation period.         The EMG was necessary specifically in this case to identify areas of muscle overactivity as well as to access the thyroarytenoid muscle which is beneath the thyroid cartilage and is not otherwise directly accessible without EMG guidance.    PLAN: I will have him  follow up on a PRN basis. It is anticipated that repeat injections will be required over the long term.

## 2025-01-30 ENCOUNTER — OFFICE VISIT (OUTPATIENT)
Dept: UROLOGY | Facility: CLINIC | Age: 70
End: 2025-01-30
Payer: COMMERCIAL

## 2025-01-30 VITALS
WEIGHT: 160 LBS | TEMPERATURE: 97.4 F | BODY MASS INDEX: 29.44 KG/M2 | HEART RATE: 68 BPM | DIASTOLIC BLOOD PRESSURE: 81 MMHG | OXYGEN SATURATION: 98 % | SYSTOLIC BLOOD PRESSURE: 126 MMHG

## 2025-01-30 DIAGNOSIS — N40.1 BENIGN PROSTATIC HYPERPLASIA WITH NOCTURIA: Primary | ICD-10-CM

## 2025-01-30 DIAGNOSIS — R35.1 BENIGN PROSTATIC HYPERPLASIA WITH NOCTURIA: Primary | ICD-10-CM

## 2025-01-30 RX ORDER — SOLIFENACIN SUCCINATE 5 MG/1
5 TABLET, FILM COATED ORAL EVERY EVENING
Qty: 90 TABLET | Refills: 1 | Status: SHIPPED | OUTPATIENT
Start: 2025-01-30

## 2025-01-30 ASSESSMENT — PAIN SCALES - GENERAL: PAINLEVEL_OUTOF10: NO PAIN (0)

## 2025-01-30 NOTE — NURSING NOTE
"Initial /81 (BP Location: Right arm, Patient Position: Sitting, Cuff Size: Adult Regular)   Pulse 68   Temp 97.4  F (36.3  C) (Tympanic)   Wt 72.6 kg (160 lb)   SpO2 98%   BMI 29.44 kg/m   Estimated body mass index is 29.44 kg/m  as calculated from the following:    Height as of 11/12/24: 1.57 m (5' 1.81\").    Weight as of this encounter: 72.6 kg (160 lb). .  Helena Nunez MA on 1/30/2025 at 9:56 AM    "

## 2025-01-30 NOTE — PROGRESS NOTES
UROLOGY FOLLOW-UP NOTE          Chief Complaint:   Today I had the pleasure of seeing . Mickey Borden in follow-up for a chief complaint of LUTS.          Interval Update   Mickey Borden is a very pleasant 69 year old male with a history of HTN and HLD.     Brief History: Mr. Mickey Borden has followed with urology for nocturia. He reported improvement in symptoms when his tamsulosin dose was increased to 0.8 mg daily.     Today's notes: He has stopped drinking fluids around 4 pm. He denies daytime frequency and weak stream. He reports nocturia x 5.          Physical Exam:   Patient is a 69 year old  male   Vitals: Blood pressure 126/81, pulse 68, temperature 97.4  F (36.3  C), temperature source Tympanic, weight 72.6 kg (160 lb), SpO2 98%.  General: Alert and oriented x 3, no acute distress.  Respiratory: Non-labored breathing.  Cardiac: Regular rate.      Labs and Pathology:    I personally reviewed all applicable laboratory data and went over findings with patient  Significant for:    CBC RESULTS:  Recent Labs   Lab Test 01/10/24  1629 12/17/18  1046   WBC 10.1 6.0   HGB 15.0 14.7    142*        BMP RESULTS:  Recent Labs   Lab Test 01/10/24  1629 10/04/23  0649 12/23/20  0800 08/20/18  0757 05/23/17  0000    140 141 141  --    POTASSIUM 4.0 4.3 4.3 4.0 5.2*   CHLORIDE 98 104 111* 108  --    CO2 23 24 28 27  --    ANIONGAP 15 12 2* 6  --    GLC 96 98 89 97 85   BUN 15.8 10.8 19 14  --    CR 1.09 1.20* 1.17 1.06 1.11   GFRESTIMATED 73 66 65 70 >60   GFRESTBLACK  --   --  75 85 >60   SNOW 9.3 9.0 8.9 8.5  --        UA RESULTS:   Recent Labs   Lab Test 01/10/24  1605   SG 1.003   URINEPH 7.0   NITRITE Negative   RBCU 0   WBCU 0       PSA RESULTS  PSA   Date Value Ref Range Status   12/23/2020 0.67 0 - 4 ug/L Final     Comment:     Assay Method:  Chemiluminescence using Siemens Vista analyzer   08/20/2018 0.54 0 - 4 ug/L Final     Comment:     Assay Method:  Chemiluminescence using Siemens  [place] analyzer     Prostate Specific Antigen Screen   Date Value Ref Range Status   10/04/2023 0.49 0.00 - 4.50 ng/mL Final   05/23/2017 0.4 0.0 - 4.5 ng/mL Final   07/17/2014 0.6 0.0 - 3.5 ng/mL Final              Assessment/Plan   69 year old male seen in follow up for nocturia. He reported improvement in symptoms when his tamsulosin dose was increased to 0.8 mg daily, but has now decreased his dose back to 0.4 mg daily.     He has stopped drinking fluids around 4 pm. He denies daytime frequency and weak stream. He reports nocturia x 5. We discussed a trial of an anticholinergic. The patient is agreeable.     Plan:  Continue tamsulosin 0.4 mg daily.   Start solifenacin 5 mg daily. Side effects reviewed.   Follow up in 3-6 months, sooner if concerns.            Past Medical History:   No past medical history on file.         Past Surgical History:     Past Surgical History:   Procedure Laterality Date    LASIK Bilateral 2002            Medications     Current Outpatient Medications   Medication Sig Dispense Refill    botulinum toxin type A (BOTOX) 100 units injection Inject 0.25 units 0.25 each     diltiazem ER (DILT-XR) 180 MG 24 hr capsule Take 1 capsule (180 mg) by mouth daily. 90 capsule 2    lisinopril (ZESTRIL) 20 MG tablet Take 1 tablet (20 mg) by mouth daily. 90 tablet 4    Multiple Vitamin (DAILY VITAMIN PO) Take  by mouth daily.      Omega-3 Fatty Acids (FISH OIL) 1200 MG CAPS Take 1 tablet by mouth daily      Saw Palmetto, Serenoa repens, (SAW PALMETTO CONCENTRATE OR)       tamsulosin (FLOMAX) 0.4 MG capsule Take 1 capsule (0.4 mg) by mouth daily. 90 capsule 4     Current Facility-Administered Medications   Medication Dose Route Frequency Provider Last Rate Last Admin    botulinum toxin type A (BOTOX) 100 units injection 0.5 Units  0.5 Units Intramuscular Q2 Months Tae Fritz MD   0.5 Units at 06/25/24 0829    botulinum toxin type A (BOTOX) 100 units injection 0.5 Units  0.5 Units  Intramuscular Q2 Months Tae Fritz MD   0.5 Units at 10/03/23 1025    botulinum toxin type A (BOTOX) 100 units injection 0.5 Units  0.5 Units Intramuscular Q90 Days Tae Fritz MD   0.5 Units at 03/28/23 1055    botulinum toxin type A (BOTOX) 100 units injection 0.5 Units  0.5 Units Intramuscular Q90 Days Tae Fritz MD   0.5 Units at 08/01/23 0934    botulinum toxin type A (BOTOX) 100 units injection 1 Units  1 Units Intramuscular Q2 Months Tae Fritz MD   1 Units at 01/14/25 1121            Family History:     Family History   Problem Relation Age of Onset    Hypertension Mother     Melanoma Father     Coronary Artery Disease Sister     Cataracts Brother             Social History:     Social History     Socioeconomic History    Marital status:      Spouse name: Not on file    Number of children: Not on file    Years of education: Not on file    Highest education level: Not on file   Occupational History    Not on file   Tobacco Use    Smoking status: Never    Smokeless tobacco: Never   Substance and Sexual Activity    Alcohol use: Yes     Comment: moderate    Drug use: No    Sexual activity: Not on file   Other Topics Concern    Parent/sibling w/ CABG, MI or angioplasty before 65F 55M? Not Asked   Social History Narrative    Not on file     Social Drivers of Health     Financial Resource Strain: Low Risk  (11/12/2024)    Financial Resource Strain     Within the past 12 months, have you or your family members you live with been unable to get utilities (heat, electricity) when it was really needed?: No   Food Insecurity: Low Risk  (11/12/2024)    Food Insecurity     Within the past 12 months, did you worry that your food would run out before you got money to buy more?: No     Within the past 12 months, did the food you bought just not last and you didn t have money to get more?: No   Transportation Needs: Low Risk  (11/12/2024)    Transportation  Needs     Within the past 12 months, has lack of transportation kept you from medical appointments, getting your medicines, non-medical meetings or appointments, work, or from getting things that you need?: No   Physical Activity: Unknown (11/12/2024)    Exercise Vital Sign     Days of Exercise per Week: 6 days     Minutes of Exercise per Session: Not on file   Stress: No Stress Concern Present (11/12/2024)    [employer] - Occupational Stress Questionnaire     Feeling of Stress : Not at all   Social Connections: Unknown (11/12/2024)    Social Connection and Isolation Panel [NHANES]     Frequency of Communication with Friends and Family: Not on file     Frequency of Social Gatherings with Friends and Family: Twice a week     Attends Congregation Services: Not on file     Active Member of Clubs or Organizations: Not on file     Attends Club or Organization Meetings: Not on file     Marital Status: Not on file   Interpersonal Safety: Low Risk  (11/12/2024)    Interpersonal Safety     Do you feel physically and emotionally safe where you currently live?: Yes     Within the past 12 months, have you been hit, slapped, kicked or otherwise physically hurt by someone?: No     Within the past 12 months, have you been humiliated or emotionally abused in other ways by your partner or ex-partner?: No   Housing Stability: Low Risk  (11/12/2024)    Housing Stability     Do you have housing? : Yes     Are you worried about losing your housing?: No            Allergies:   Patient has no known allergies.         Review of Systems:  From intake questionnaire   Negative 14 system review except as noted on HPI, nurse's note.        TINA LACY PA-C  Department of Urology

## 2025-03-22 DIAGNOSIS — I10 BENIGN ESSENTIAL HYPERTENSION: ICD-10-CM

## 2025-03-24 RX ORDER — LISINOPRIL 20 MG/1
20 TABLET ORAL DAILY
Qty: 90 TABLET | Refills: 4 | OUTPATIENT
Start: 2025-03-24

## 2025-04-04 ENCOUNTER — TRANSFERRED RECORDS (OUTPATIENT)
Dept: HEALTH INFORMATION MANAGEMENT | Facility: CLINIC | Age: 70
End: 2025-04-04
Payer: COMMERCIAL

## 2025-04-08 ENCOUNTER — OFFICE VISIT (OUTPATIENT)
Dept: OTOLARYNGOLOGY | Facility: CLINIC | Age: 70
End: 2025-04-08
Payer: COMMERCIAL

## 2025-04-08 DIAGNOSIS — J38.5 LARYNGEAL SPASM: Primary | ICD-10-CM

## 2025-04-08 DIAGNOSIS — J38.3 OTHER DISEASES OF VOCAL CORDS: ICD-10-CM

## 2025-04-08 PROCEDURE — 64617 CHEMODENER MUSCLE LARYNX EMG: CPT | Mod: 50 | Performed by: OTOLARYNGOLOGY

## 2025-04-08 NOTE — LETTER
4/8/2025       RE: Mickey Borden  [id number]  [facility] [address]     Dear Colleague,    Thank you for referring your patient, Mickey Borden, to the [facility] EAR NOSE AND THROAT CLINIC [place] at [facility]. Please see a copy of my visit note below.    Mickey Borden is a 69 year old male with a history of adductor laryngeal dystonia and laryngeal spasm.  he was last injected on 1/14/2025. he had a good response to the last injection. The last dose given was 0.25 units into each thyroarytenoid muscle.  After the injection  he had a breathy dysphonia for 0 weeks.There was no Dysphagia or Dyspnea.  The response lasted for 2.5 months.   Our plan is to give 0.25 units of Botulinum A toxin into  each thyroarytenoid muscle today.      PROCEDURE: After obtaining consent, the patient was placed in the supine position. Ground and reference electrodes were applied. The anterior neck was cleaned with an alcohol swab.  Using a 27-gauge, unipolar electromyography needle, the larynx was entered through the cricothyroid space.  0.25 units of botulinum A toxin was injected into each thyroarytenoid muscle.  There was a Strong EMG response to phonation on the left side and a Strong EMG response to phonation on the right side.  The total amount of botulinum A toxin delivered today was 0.5 units. An additional 99.5 units of botulinum A toxin was necessarily wasted in preparation for the injection. he tolerated the procedure well and left the clinic after a short observation period.         The EMG was necessary specifically in this case to identify areas of muscle overactivity as well as to access the thyroarytenoid muscle which is beneath the thyroid cartilage and is not otherwise directly accessible without EMG guidance.    PLAN: I will have him  follow up on a PRN basis. It is anticipated that repeat injections will be required over the long  term.        Again, thank you for allowing me to participate in the care of your patient.      Sincerely,    Tae Fritz MD

## 2025-04-08 NOTE — PATIENT INSTRUCTIONS
1.  You were seen in the ENT Clinic today by . If you have any questions or concerns after your appointment, please call [phone]. Press option #1 for scheduling related needs. Press option #3 for Nurse advice.    2. Plan is to return to clinic 7/7/25 for repeat botox      My Adorno LPN  [phone]  [facility] - Otolaryngology

## 2025-04-08 NOTE — PROGRESS NOTES
Mickey Borden is a 69 year old male with a history of adductor laryngeal dystonia and laryngeal spasm.  he was last injected on 1/14/2025. he had a good response to the last injection. The last dose given was 0.25 units into each thyroarytenoid muscle.  After the injection  he had a breathy dysphonia for 0 weeks.There was no Dysphagia or Dyspnea.  The response lasted for 2.5 months.   Our plan is to give 0.25 units of Botulinum A toxin into  each thyroarytenoid muscle today.      PROCEDURE: After obtaining consent, the patient was placed in the supine position. Ground and reference electrodes were applied. The anterior neck was cleaned with an alcohol swab.  Using a 27-gauge, unipolar electromyography needle, the larynx was entered through the cricothyroid space.  0.25 units of botulinum A toxin was injected into each thyroarytenoid muscle.  There was a Strong EMG response to phonation on the left side and a Strong EMG response to phonation on the right side.  The total amount of botulinum A toxin delivered today was 0.5 units. An additional 99.5 units of botulinum A toxin was necessarily wasted in preparation for the injection. he tolerated the procedure well and left the clinic after a short observation period.         The EMG was necessary specifically in this case to identify areas of muscle overactivity as well as to access the thyroarytenoid muscle which is beneath the thyroid cartilage and is not otherwise directly accessible without EMG guidance.    PLAN: I will have him  follow up on a PRN basis. It is anticipated that repeat injections will be required over the long term.

## 2025-04-16 DIAGNOSIS — J38.5 LARYNGEAL SPASM: Primary | ICD-10-CM

## 2025-05-09 ENCOUNTER — TRANSFERRED RECORDS (OUTPATIENT)
Dept: HEALTH INFORMATION MANAGEMENT | Facility: CLINIC | Age: 70
End: 2025-05-09
Payer: COMMERCIAL

## 2025-05-29 ENCOUNTER — ANCILLARY PROCEDURE (OUTPATIENT)
Dept: GENERAL RADIOLOGY | Facility: CLINIC | Age: 70
End: 2025-05-29
Attending: PHYSICIAN ASSISTANT
Payer: COMMERCIAL

## 2025-05-29 ENCOUNTER — OFFICE VISIT (OUTPATIENT)
Dept: URGENT CARE | Facility: URGENT CARE | Age: 70
End: 2025-05-29
Payer: COMMERCIAL

## 2025-05-29 VITALS
RESPIRATION RATE: 16 BRPM | DIASTOLIC BLOOD PRESSURE: 80 MMHG | WEIGHT: 152 LBS | SYSTOLIC BLOOD PRESSURE: 146 MMHG | BODY MASS INDEX: 27.98 KG/M2 | OXYGEN SATURATION: 97 % | HEART RATE: 67 BPM | TEMPERATURE: 97.7 F

## 2025-05-29 DIAGNOSIS — S69.91XA INJURY OF FINGER OF RIGHT HAND, INITIAL ENCOUNTER: Primary | ICD-10-CM

## 2025-05-29 ASSESSMENT — ENCOUNTER SYMPTOMS
NEUROLOGICAL NEGATIVE: 1
CARDIOVASCULAR NEGATIVE: 1
CHILLS: 0
DIARRHEA: 0
FEVER: 0
CHEST TIGHTNESS: 0
WHEEZING: 0
SHORTNESS OF BREATH: 0
VOMITING: 0
NAUSEA: 0
HEMATURIA: 0
RESPIRATORY NEGATIVE: 1
MUSCULOSKELETAL NEGATIVE: 1
SORE THROAT: 0
DYSURIA: 0
ALLERGIC/IMMUNOLOGIC NEGATIVE: 1
GASTROINTESTINAL NEGATIVE: 1
FREQUENCY: 0
MYALGIAS: 0
CONSTITUTIONAL NEGATIVE: 1
PALPITATIONS: 0
WOUND: 1
HEADACHES: 0
ABDOMINAL PAIN: 0
COUGH: 0

## 2025-05-29 NOTE — PROGRESS NOTES
Chief Complaint:     Chief Complaint   Patient presents with    Laceration     Right pinky finger, finger got forced between a dock and a pole.      Assessment:     1. Injury of finger of right hand, initial encounter       Plan:    Imaging of the injured area was negative for any acute fracture or foreign body per my read.      Wound closed per procedure note below.    Patient instructed to go to pharmacy for Tetanus shot.      Wound was irrigated with water before repair.  Antibiotic ointment and sterile dressing applied in clinic.     Discussed home wound care. Return to  with increased swelling, pain, redness, pus or fevers.      Patient was discharged in stable condition.  Patient verbalized understanding and agreed with this plan.      Procedure Note - Wound Repair:  Procedure performed by Jj Hopkins.     Anesthesia was obtained with 0.25% Bupivicaine via digital block.  The wound is superficial in nature.  Finger tourniquet was applied.  Small scissors was used to trim unattached skin.  Glue was then used to close wound.  Tourniquet removed with small amount of bleeding that was stopped with direct pressure using band aid.        SUBJECTIVE     HPI: Mickey Borden is an 69 year old male that presents to clinic after cutting self on the R pinky finger when he pinched it between a boat dock and a pole. He denies numbness of the finger. He denies dysfunction of the finger. Patient denies any loss of strength to the finger.  Patient is not up to date on tetanus.     Review of Systems:  Review of Systems   Constitutional: Negative.  Negative for chills and fever.   HENT: Negative.  Negative for sore throat.    Respiratory: Negative.  Negative for cough, chest tightness, shortness of breath and wheezing.    Cardiovascular: Negative.  Negative for chest pain and palpitations.   Gastrointestinal: Negative.  Negative for abdominal pain, diarrhea, nausea and vomiting.   Genitourinary:  Negative for dysuria,  frequency, hematuria and urgency.   Musculoskeletal: Negative.  Negative for myalgias.   Skin:  Positive for wound. Negative for rash.   Allergic/Immunologic: Negative.  Negative for immunocompromised state.   Neurological: Negative.  Negative for headaches.         Family History   Family History   Problem Relation Age of Onset    Hypertension Mother     Melanoma Father     Coronary Artery Disease Sister     Cataracts Brother         Problem history  Patient Active Problem List   Diagnosis    Laryngeal spasm    Benign essential hypertension    Hyperlipidemia LDL goal <130    Mixed type age-related cataract, both eyes    Aneurysm of right internal carotid artery    HTN (hypertension)        Allergies  No Known Allergies     Social History  Social History     Socioeconomic History    Marital status:      Spouse name: Not on file    Number of children: Not on file    Years of education: Not on file    Highest education level: Not on file   Occupational History    Not on file   Tobacco Use    Smoking status: Never    Smokeless tobacco: Never    Tobacco comments:     occasional cigar but have not had one for several weeks   Vaping Use    Vaping status: Never Used   Substance and Sexual Activity    Alcohol use: Yes     Comment: two glasses of wine, two days per week (usually weekends)    Drug use: No    Sexual activity: Yes     Partners: Female     Birth control/protection: Spermicide     Comment: Not that often - wife only, once every three or four months   Other Topics Concern    Parent/sibling w/ CABG, MI or angioplasty before 65F 55M? Not Asked   Social History Narrative    Not on file     Social Drivers of Health     Financial Resource Strain: Low Risk  (11/12/2024)    Financial Resource Strain     Within the past 12 months, have you or your family members you live with been unable to get utilities (heat, electricity) when it was really needed?: No   Food Insecurity: Low Risk  (11/12/2024)    Food Insecurity      Within the past 12 months, did you worry that your food would run out before you got money to buy more?: No     Within the past 12 months, did the food you bought just not last and you didn t have money to get more?: No   Transportation Needs: Low Risk  (11/12/2024)    Transportation Needs     Within the past 12 months, has lack of transportation kept you from medical appointments, getting your medicines, non-medical meetings or appointments, work, or from getting things that you need?: No   Physical Activity: Unknown (11/12/2024)    Exercise Vital Sign     Days of Exercise per Week: 6 days     Minutes of Exercise per Session: Not on file   Stress: No Stress Concern Present (11/12/2024)    [employer] - Occupational Stress Questionnaire     Feeling of Stress : Not at all   Social Connections: Unknown (11/12/2024)    Social Connection and Isolation Panel [NHANES]     Frequency of Communication with Friends and Family: Not on file     Frequency of Social Gatherings with Friends and Family: Twice a week     Attends Anabaptist Services: Not on file     Active Member of Clubs or Organizations: Not on file     Attends Club or Organization Meetings: Not on file     Marital Status: Not on file   Interpersonal Safety: Low Risk  (11/12/2024)    Interpersonal Safety     Do you feel physically and emotionally safe where you currently live?: Yes     Within the past 12 months, have you been hit, slapped, kicked or otherwise physically hurt by someone?: No     Within the past 12 months, have you been humiliated or emotionally abused in other ways by your partner or ex-partner?: No   Housing Stability: Low Risk  (11/12/2024)    Housing Stability     Do you have housing? : Yes     Are you worried about losing your housing?: No        Current Meds    Current Outpatient Medications:     botulinum toxin type A (BOTOX) 100 units injection, Inject 0.25 units, Disp: 0.25 each, Rfl:     diltiazem ER  (DILT-XR) 180 MG 24 hr capsule, Take 1 capsule (180 mg) by mouth daily., Disp: 90 capsule, Rfl: 2    lisinopril (ZESTRIL) 20 MG tablet, Take 1 tablet (20 mg) by mouth daily., Disp: 90 tablet, Rfl: 1    Multiple Vitamin (DAILY VITAMIN PO), Take  by mouth daily., Disp: , Rfl:     Omega-3 Fatty Acids (FISH OIL) 1200 MG CAPS, Take 1 tablet by mouth daily, Disp: , Rfl:     polymixin b-trimethoprim (POLYTRIM) 14947-1.1 UNIT/ML-% ophthalmic solution, INSTILL 1 TO 2 DROPS IN BOTH EYES FOUR TIMES DAILY FOR 7 DAYS, Disp: 10 mL, Rfl: 0    Saw Palmetto, Serenoa repens, (SAW PALMETTO CONCENTRATE OR), , Disp: , Rfl:     solifenacin (VESICARE) 5 MG tablet, Take 1 tablet (5 mg) by mouth every evening., Disp: 90 tablet, Rfl: 1    tamsulosin (FLOMAX) 0.4 MG capsule, Take 1 capsule (0.4 mg) by mouth daily., Disp: 90 capsule, Rfl: 4    Current Facility-Administered Medications:     botulinum toxin type A (BOTOX) 100 units injection 0.5 Units, 0.5 Units, Intramuscular, Q90 Days, Vira Mendez MD     OBJECTIVE    Vitals reviewed by Korey Hopkins PA-C  BP (!) 146/80   Pulse 67   Temp 97.7  F (36.5  C) (Tympanic)   Resp 16   Wt 68.9 kg (152 lb)   SpO2 97%   BMI 27.98 kg/m      Physical Exam:    Physical Exam  Vitals and nursing note reviewed.   Constitutional:       General: He is not in acute distress.     Appearance: He is well-developed. He is not ill-appearing, toxic-appearing or diaphoretic.   HENT:      Head: Normocephalic and atraumatic.      Right Ear: Hearing, tympanic membrane, ear canal and external ear normal. Tympanic membrane is not perforated, erythematous, retracted or bulging.      Left Ear: Hearing, tympanic membrane, ear canal and external ear normal. Tympanic membrane is not perforated, erythematous, retracted or bulging.      Nose: Nose normal. No mucosal edema, congestion or rhinorrhea.      Mouth/Throat:      Pharynx: No oropharyngeal exudate or posterior oropharyngeal erythema.      Tonsils: No  tonsillar exudate or tonsillar abscesses. 0 on the right. 0 on the left.   Eyes:      Pupils: Pupils are equal, round, and reactive to light.   Cardiovascular:      Rate and Rhythm: Normal rate and regular rhythm.      Heart sounds: Normal heart sounds, S1 normal and S2 normal. Heart sounds not distant. No murmur heard.     No friction rub. No gallop.   Pulmonary:      Effort: Pulmonary effort is normal. No respiratory distress.      Breath sounds: Normal breath sounds. No decreased breath sounds, wheezing, rhonchi or rales.   Abdominal:      General: Bowel sounds are normal. There is no distension.      Palpations: Abdomen is soft.      Tenderness: There is no abdominal tenderness.   Musculoskeletal:      Cervical back: Normal range of motion and neck supple.   Lymphadenopathy:      Cervical: No cervical adenopathy.   Skin:     General: Skin is warm and dry.      Findings: No rash.   Neurological:      Mental Status: He is alert.      Cranial Nerves: No cranial nerve deficit.   Psychiatric:         Attention and Perception: He is attentive.         Speech: Speech normal.         Behavior: Behavior normal. Behavior is cooperative.         Thought Content: Thought content normal.         Judgment: Judgment normal.         Korey Hopkins PA-C 2:04 PM

## 2025-05-29 NOTE — PROGRESS NOTES
Urgent Care Clinic Visit    Chief Complaint   Patient presents with    Laceration     Right pinky finger, finger got forced between a dock and a pole.          Review of last Tetanus vaccine: 2007 5/29/2025     2:07 PM   Additional Questions   Roomed by Erika WAGONER

## 2025-05-30 ENCOUNTER — APPOINTMENT (OUTPATIENT)
Dept: CT IMAGING | Facility: CLINIC | Age: 70
End: 2025-05-30
Payer: COMMERCIAL

## 2025-05-30 ENCOUNTER — HOSPITAL ENCOUNTER (EMERGENCY)
Facility: CLINIC | Age: 70
Discharge: HOME | End: 2025-05-30
Payer: COMMERCIAL

## 2025-05-30 VITALS
RESPIRATION RATE: 18 BRPM | OXYGEN SATURATION: 99 % | BODY MASS INDEX: 26.58 KG/M2 | HEART RATE: 52 BPM | DIASTOLIC BLOOD PRESSURE: 84 MMHG | WEIGHT: 150 LBS | TEMPERATURE: 98.4 F | SYSTOLIC BLOOD PRESSURE: 140 MMHG | HEIGHT: 63 IN

## 2025-05-30 DIAGNOSIS — K57.92 DIVERTICULITIS: Primary | ICD-10-CM

## 2025-05-30 LAB
ALBUMIN SERPL BCG-MCNC: 4 G/DL (ref 3.5–5.2)
ALBUMIN UR-MCNC: NEGATIVE MG/DL
ALP SERPL-CCNC: 53 U/L (ref 40–150)
ALT SERPL W P-5'-P-CCNC: 24 U/L (ref 0–70)
ANION GAP SERPL CALCULATED.3IONS-SCNC: 9 MMOL/L (ref 7–15)
APPEARANCE UR: CLEAR
AST SERPL W P-5'-P-CCNC: 25 U/L (ref 0–45)
BILIRUB DIRECT SERPL-MCNC: 0.11 MG/DL (ref 0–0.3)
BILIRUB SERPL-MCNC: 0.3 MG/DL
BILIRUB UR QL STRIP: NEGATIVE
BUN SERPL-MCNC: 17.1 MG/DL (ref 8–23)
CALCIUM SERPL-MCNC: 8.9 MG/DL (ref 8.8–10.4)
CHLORIDE SERPL-SCNC: 101 MMOL/L (ref 98–107)
COLOR UR AUTO: NORMAL
CREAT SERPL-MCNC: 1.06 MG/DL (ref 0.67–1.17)
EGFRCR SERPLBLD CKD-EPI 2021: 75 ML/MIN/1.73M2
ERYTHROCYTE [DISTWIDTH] IN BLOOD BY AUTOMATED COUNT: 13 % (ref 10–15)
GLUCOSE SERPL-MCNC: 118 MG/DL (ref 70–99)
GLUCOSE UR STRIP-MCNC: NEGATIVE MG/DL
HCO3 SERPL-SCNC: 27 MMOL/L (ref 22–29)
HCT VFR BLD AUTO: 38.5 % (ref 40–53)
HGB BLD-MCNC: 13.2 G/DL (ref 13.3–17.7)
HGB UR QL STRIP: NEGATIVE
KETONES UR STRIP-MCNC: NEGATIVE MG/DL
LEUKOCYTE ESTERASE UR QL STRIP: NEGATIVE
LIPASE SERPL-CCNC: 27 U/L (ref 13–60)
MCH RBC QN AUTO: 32.9 PG (ref 26.5–33)
MCHC RBC AUTO-ENTMCNC: 34.3 G/DL (ref 31.5–36.5)
MCV RBC AUTO: 96 FL (ref 78–100)
NITRATE UR QL: NEGATIVE
PH UR STRIP: 6.5 [PH] (ref 5–7)
PLATELET # BLD AUTO: 141 10E3/UL (ref 150–450)
POTASSIUM SERPL-SCNC: 4.4 MMOL/L (ref 3.4–5.3)
PROT SERPL-MCNC: 6.6 G/DL (ref 6.4–8.3)
RBC # BLD AUTO: 4.01 10E6/UL (ref 4.4–5.9)
RBC URINE: 0 /HPF
SODIUM SERPL-SCNC: 137 MMOL/L (ref 135–145)
SP GR UR STRIP: 1.01 (ref 1–1.03)
UROBILINOGEN UR STRIP-MCNC: NORMAL MG/DL
WBC # BLD AUTO: 6.9 10E3/UL (ref 4–11)
WBC URINE: 0 /HPF

## 2025-05-30 PROCEDURE — 96360 HYDRATION IV INFUSION INIT: CPT

## 2025-05-30 PROCEDURE — 258N000003 HC RX IP 258 OP 636

## 2025-05-30 PROCEDURE — 250N000009 HC RX 250

## 2025-05-30 PROCEDURE — 85014 HEMATOCRIT: CPT

## 2025-05-30 PROCEDURE — 84520 ASSAY OF UREA NITROGEN: CPT

## 2025-05-30 PROCEDURE — 82040 ASSAY OF SERUM ALBUMIN: CPT

## 2025-05-30 PROCEDURE — 96361 HYDRATE IV INFUSION ADD-ON: CPT

## 2025-05-30 PROCEDURE — 74177 CT ABD & PELVIS W/CONTRAST: CPT

## 2025-05-30 PROCEDURE — 81003 URINALYSIS AUTO W/O SCOPE: CPT

## 2025-05-30 PROCEDURE — 99285 EMERGENCY DEPT VISIT HI MDM: CPT

## 2025-05-30 PROCEDURE — 99285 EMERGENCY DEPT VISIT HI MDM: CPT | Mod: 25

## 2025-05-30 PROCEDURE — 83690 ASSAY OF LIPASE: CPT

## 2025-05-30 PROCEDURE — 250N000011 HC RX IP 250 OP 636

## 2025-05-30 PROCEDURE — 36415 COLL VENOUS BLD VENIPUNCTURE: CPT

## 2025-05-30 RX ORDER — METRONIDAZOLE 500 MG/1
500 TABLET ORAL 2 TIMES DAILY
Qty: 10 TABLET | Refills: 0 | Status: SHIPPED | OUTPATIENT
Start: 2025-05-30 | End: 2025-06-04

## 2025-05-30 RX ORDER — CIPROFLOXACIN 500 MG/1
500 TABLET, FILM COATED ORAL 2 TIMES DAILY
Qty: 10 TABLET | Refills: 0 | Status: SHIPPED | OUTPATIENT
Start: 2025-05-30 | End: 2025-06-04

## 2025-05-30 RX ORDER — OXYCODONE HYDROCHLORIDE 5 MG/1
5 TABLET ORAL EVERY 6 HOURS PRN
Qty: 5 TABLET | Refills: 0 | Status: SHIPPED | OUTPATIENT
Start: 2025-05-30 | End: 2025-06-02

## 2025-05-30 RX ORDER — IOPAMIDOL 755 MG/ML
74 INJECTION, SOLUTION INTRAVASCULAR ONCE
Status: COMPLETED | OUTPATIENT
Start: 2025-05-30 | End: 2025-05-30

## 2025-05-30 RX ORDER — HYDROMORPHONE HYDROCHLORIDE 1 MG/ML
0.5 INJECTION, SOLUTION INTRAMUSCULAR; INTRAVENOUS; SUBCUTANEOUS ONCE
Status: COMPLETED | OUTPATIENT
Start: 2025-05-30 | End: 2025-05-30

## 2025-05-30 RX ADMIN — SODIUM CHLORIDE 1000 ML: 0.9 INJECTION, SOLUTION INTRAVENOUS at 17:10

## 2025-05-30 RX ADMIN — IOPAMIDOL 74 ML: 755 INJECTION, SOLUTION INTRAVENOUS at 18:18

## 2025-05-30 RX ADMIN — SODIUM CHLORIDE 58 ML: 9 INJECTION, SOLUTION INTRAVENOUS at 18:18

## 2025-05-30 ASSESSMENT — COLUMBIA-SUICIDE SEVERITY RATING SCALE - C-SSRS
2. HAVE YOU ACTUALLY HAD ANY THOUGHTS OF KILLING YOURSELF IN THE PAST MONTH?: NO
6. HAVE YOU EVER DONE ANYTHING, STARTED TO DO ANYTHING, OR PREPARED TO DO ANYTHING TO END YOUR LIFE?: NO
1. IN THE PAST MONTH, HAVE YOU WISHED YOU WERE DEAD OR WISHED YOU COULD GO TO SLEEP AND NOT WAKE UP?: NO

## 2025-05-30 ASSESSMENT — ACTIVITIES OF DAILY LIVING (ADL)
ADLS_ACUITY_SCORE: 41

## 2025-05-30 NOTE — ED TRIAGE NOTES
States right/mid groin pain. Discomfort when he presses on it and with movement. No swelling. BM today and states that helped with some of the pressure. Was lifting a dock yesterday an unsure if injured with that activity.      Triage Assessment (Adult)       Row Name 05/30/25 7029          Triage Assessment    Airway WDL WDL        Respiratory WDL    Respiratory WDL WDL        Skin Circulation/Temperature WDL    Skin Circulation/Temperature WDL WDL        Cardiac WDL    Cardiac WDL WDL        Peripheral/Neurovascular WDL    Peripheral Neurovascular WDL WDL        Cognitive/Neuro/Behavioral WDL    Cognitive/Neuro/Behavioral WDL WDL

## 2025-05-30 NOTE — ED PROVIDER NOTES
"[facility]  Emergency Department Visit Note    PATIENT:  Mickey Borden     69 year old     male      [id number]    Chief complaint:  Chief Complaint   Patient presents with    Groin Pain        History of present illness:  Patient is a 69 year old male with HTN, HLD presenting for evaluation of right lower quadrant abdominal pain.    Patient notes was lifting a dock yesterday.  He does not recall a specific injury during that to his abdomen or groin.  No specific pain then.  He did report pain into his little finger and so went to urgent care and had that evaluated, but he does not think he had this right lower quadrant abdominal pain at that time.    Was generally well when he woke this morning.  Roughly 2 hours prior to emergency department presentation he was riding home when he had onset of right lower quadrant abdominal pain.  This has been consistent.  He notes when he presses on the left side of his abdomen that he has right lower quadrant abdominal pain.  No fevers or vomiting.  No chest pain or dyspnea.  No urinary symptoms.  Had a bowel movement this afternoon that was roughly normal for him.  No prior abdominal surgeries.  Last had fluid around noon.      Review of Systems:  As in HPI above    BP (!) 158/97   Pulse 60   Temp 98.4  F (36.9  C) (Oral)   Resp 18   Ht 1.6 m (5' 3\")   Wt 68 kg (150 lb)   SpO2 99%   BMI 26.57 kg/m        Physical Exam:  Constitutional: laying in hospital bed, alert, oriented, and in no apparent distress  HEENT: normocephalic, atraumatic and sclerae anicteric  Neck: no stridor  Cardiovascular: regular rate and rhythm and no murmurs, rubs, or extra heart sounds  Pulmonary: breathing comfortably on room air and lungs clear to auscultation bilaterally  Abdominal: Right lower quadrant tenderness with localized peritonitis and guarding, positive rebound, no right upper quadrant tenderness.  No right inguinal hernia.  Extremities/MSK: no peripheral " edema  Skin: warm, dry  Neurologic: moves all four extremities spontaneously  Psychiatric: calm, appropriate      MDM:  Patient is a 69 year old male with above history presenting for evaluation of right lower quadrant abdominal pain.    Vitals notable for mild hypertension but otherwise normal. Exam with abdominal findings as above, though he overall is alert, well-appearing.    Presentation consistent with appendicitis.  Labs, urine, CT.  Anticipating need for surgical consult.  Made n.p.o., giving 1 L NS bolus for hydration.  Hydromorphone for pain.    Disposition pending above workup. Remainder of ED course below.    ED COURSE:  ED Course as of 05/30/25 2027   Fri May 30, 2025   1749 Labs reviewed, all reassuring.   1803 UA with Microscopic reflex to Culture  No UTI   1935 CT Abdomen Pelvis w Contrast  Diverticulitis.  Uncomplicated.   2023 Patient reassessed, resting comfortably.  Minimal pain currently though he has had occasional waves of pain.  Vital stable throughout emergency department stay.  We discussed pain likely due to uncomplicated diverticulitis.  No other acute findings.  We discussed the typical historical treatment for this of antibiotics, though recent literature suggesting a nonantibiotic approach is also acceptable.  I offered hospitalization if his pain is severe, though he feels it is tolerable at home.  We discussed antibiotics versus holding off, he would like to hold off for now, but they would like to have the antibiotic prescription sent in the event they change their minds overnight.  I was explicit that if he is having new or worsening symptoms, including fever, worsening pain, vomiting, or any other new or worsening symptoms, that he needs to return to the emergency department.  He expressed understanding of and agreement with this.  Sending with several tablets 5 mg oxycodone for pain.       Encounter Diagnoses:  Final diagnoses:   Diverticulitis       Final disposition:  discharge    Mike Vallecillo MD  5/30/2025  4:39 PM   Emergency Medicine  [facility]      Mike Vallecillo MD  05/30/25 2027

## 2025-05-31 NOTE — DISCHARGE INSTRUCTIONS
You were seen in the emergency department today for abdominal pain. We did tests including blood test, urine test, and CT scan that showed that you have diverticulitis.  This is inflammation/infection of the colon.     As we discussed, historically everybody got antibiotics for this.  However, more recent research has shown that antibiotics may not be necessary and uncomplicated diverticulitis, which is what you have.  We discussed antibiotics and you opted to hold off for now which is reasonable.  If you change your mind over the next couple of days, I sent you with a prescription for 2 antibiotics called ciprofloxacin, also called Cipro, as well as metronidazole, also called Flagyl.  These were sent to the [facility].  Take these as prescribed.    In the meantime, you can take acetaminophen (Tylenol) or ibuprofen for your pain. You can alternate these every three hours as needed. So, for example, you could take acetaminophen at noon, then ibuprofen at 3pm, then acetaminophen again at 6pm, and so on. Do not take more Tylenol or ibuprofen than the daily maximum dose as indicated on the bottle.    I am sending you with a prescription for a medication called oxycodone. This is a powerful pain medicine you can take as needed for pain. You should first try acetaminophen (Tylenol) or ibuprofen to help with pain, and only take oxycodone if the first medication does not help. Take this medication only as prescribed on the bottle. Do not drink alcohol, operate heavy machinery (such as a car), or make important life decisions while taking this medication.     Please follow up with your primary doctor in the next 2-4 weeks for ongoing evaluation and management of your symptoms.    Return to the emergency department with new or worsening symptoms that you find concerning.

## 2025-07-07 ENCOUNTER — OFFICE VISIT (OUTPATIENT)
Dept: OTOLARYNGOLOGY | Facility: CLINIC | Age: 70
End: 2025-07-07
Payer: COMMERCIAL

## 2025-07-07 DIAGNOSIS — J38.3 OTHER DISEASES OF VOCAL CORDS: ICD-10-CM

## 2025-07-07 DIAGNOSIS — J38.3 SPASMODIC DYSPHONIA: ICD-10-CM

## 2025-07-07 DIAGNOSIS — J38.5 LARYNGEAL SPASM: Primary | ICD-10-CM

## 2025-07-07 DIAGNOSIS — G24.9 DYSTONIA: ICD-10-CM

## 2025-07-07 PROCEDURE — 64617 CHEMODENER MUSCLE LARYNX EMG: CPT | Mod: 50 | Performed by: OTOLARYNGOLOGY

## 2025-07-07 NOTE — LETTER
7/7/2025      RE: Mickey Borden  [id number]  [facility] [address]       Dear Colleague:  Mickey Borden was recently seen at the [facility]. My clinic note from our visit is enclosed below.  Speech recognition software may have been used in the documentation below; please contact me for any needed clarifications.     I appreciate the ongoing opportunity to participate in this patient's care.    Please feel free to contact me with any questions.      Sincerely yours,  Vira Mendez M.D., M.P.H.  , Laryngology  Director, [facility]  Otolaryngology- Head & Neck Surgery  [phone]            PROCEDURE NOTE: Laryngeal botulinum toxin injection with electromyographic guidance    HISTORY OF PRESENT ILLNESS: Mickey Borden is a pleasant 69 year old male who presents with adductor laryngeal dystonia and laryngeal spasm.  He previously received injections with Dr Fritz.    PRIOR INJECTION: 4/8/25  He was last injected with 0.25 units in the Left thyroarytenoid muscle and 0.25 units in the Right thyroarytenoid muscle. He noted 0.5 week(s) of breathiness, 0 week(s) of dysphagia, 0 week(s) of dyspnea, then good voice until spasms began to return about 2.5 weeks ago following this injection. He would like to keep the same dosing--he has been at this dosing for a while after having been at both higher and lower doses in the past.    PROCEDURE: After completing written informed consent, the patient was placed in the supine position. The anterior neck was cleaned with an alcohol swab. Using a 27-gauge, unipolar electromyography needle, the larynx was entered through the cricothyroid space. 0.25 units of botulinum A toxin was injected into each thyroarytenoid muscle. There was 2+ EMG recruitment response to phonation on the Left side and a 3+ EMG recruitment response to phonation on the Right side. The total amount of botulinum A toxin delivered today  was 0.5 units. Waste (as required per compliance): 99.5 units out of 100 unit single-patient-use bottle, including priming the EMG needle. The patient tolerated the procedure well and left the clinic after a short observation period.   The EMG was necessary specifically in this case to identify areas of muscle overactivity for targeted injection as well as to access the muscle which is deep to the thyroid cartilage and is not otherwise directly accessible without EMG guidance.   He will return as needed. Repeat injections are typically needed if the symptoms respond to botulinum toxin injection due to the temporary nature of the mechanisms of botulinum effects.      Vira Mendez MD

## 2025-07-07 NOTE — PROGRESS NOTES
Dear Colleague:  Mickey Borden was recently seen at the [facility]. My clinic note from our visit is enclosed below.  Speech recognition software may have been used in the documentation below; please contact me for any needed clarifications.     I appreciate the ongoing opportunity to participate in this patient's care.    Please feel free to contact me with any questions.      Sincerely yours,  Vira Mendez M.D., M.P.H.  , Laryngology  Director, [facility]  Otolaryngology- Head & Neck Surgery  [phone]            PROCEDURE NOTE: Laryngeal botulinum toxin injection with electromyographic guidance    HISTORY OF PRESENT ILLNESS: Mickey Borden is a pleasant 69 year old male who presents with adductor laryngeal dystonia and laryngeal spasm.  He previously received injections with Dr Fritz.    PRIOR INJECTION: 4/8/25  He was last injected with 0.25 units in the Left thyroarytenoid muscle and 0.25 units in the Right thyroarytenoid muscle. He noted 0.5 week(s) of breathiness, 0 week(s) of dysphagia, 0 week(s) of dyspnea, then good voice until spasms began to return about 2.5 weeks ago following this injection. He would like to keep the same dosing--he has been at this dosing for a while after having been at both higher and lower doses in the past.    PROCEDURE: After completing written informed consent, the patient was placed in the supine position. The anterior neck was cleaned with an alcohol swab. Using a 27-gauge, unipolar electromyography needle, the larynx was entered through the cricothyroid space. 0.25 units of botulinum A toxin was injected into each thyroarytenoid muscle. There was 2+ EMG recruitment response to phonation on the Left side and a 3+ EMG recruitment response to phonation on the Right side. The total amount of botulinum A toxin delivered today was 0.5 units. Waste (as required per compliance): 99.5 units out of 100 unit  single-patient-use bottle, including priming the EMG needle. The patient tolerated the procedure well and left the clinic after a short observation period.   The EMG was necessary specifically in this case to identify areas of muscle overactivity for targeted injection as well as to access the muscle which is deep to the thyroid cartilage and is not otherwise directly accessible without EMG guidance.   He will return as needed. Repeat injections are typically needed if the symptoms respond to botulinum toxin injection due to the temporary nature of the mechanisms of botulinum effects.

## 2025-07-07 NOTE — PATIENT INSTRUCTIONS
1.  You were seen in the ENT Clinic today by Dr. Mendez. If you have any questions or concerns after your appointment, please call [phone]. Press option #1 for scheduling related needs. Press option #3 for Nurse advice.     2. Plan is to return to clinic 9/29/25 for repeat botox injection     How to Contact Us:  Send a [employer] message to your provider. Our team will respond to you via [employer]. Occasionally, we will need to call you to get further information.  For urgent matters (Monday-Friday, 8:00 AM-3:30 PM), call the ENT Clinic: [phone] and speak with a call center team member - they will route your call appropriately.           My Adorno LPN  [phone]  [facility] - Otolaryngology

## 2025-07-28 DIAGNOSIS — N40.1 BENIGN PROSTATIC HYPERPLASIA WITH NOCTURIA: ICD-10-CM

## 2025-07-28 DIAGNOSIS — R35.1 BENIGN PROSTATIC HYPERPLASIA WITH NOCTURIA: ICD-10-CM

## 2025-07-28 RX ORDER — SOLIFENACIN SUCCINATE 5 MG/1
5 TABLET, FILM COATED ORAL EVERY EVENING
Qty: 90 TABLET | Refills: 0 | Status: SHIPPED | OUTPATIENT
Start: 2025-07-28

## 2025-07-28 NOTE — TELEPHONE ENCOUNTER
Requested Prescriptions   Pending Prescriptions Disp Refills    solifenacin (VESICARE) 5 MG tablet 90 tablet 1     Sig: Take 1 tablet (5 mg) by mouth every evening.       There is no refill protocol information for this order          Last office visit: 1/30/2025 ; last virtual visit: Visit date not found with prescribing provider:  Yenifer Olivares   Future Office Visit:      Thank you,  Zayra Michaud  PSC/ Complex   [facility]  [address] [place] [address]  Employed by [employer]

## 2025-07-28 NOTE — TELEPHONE ENCOUNTER
One refill sent and message sent to pt to make follow up appt for further refills.   Sruthi PEPPER RN BSN PHN  Specialty Clinics

## 2025-07-30 ENCOUNTER — TELEPHONE (OUTPATIENT)
Dept: PEDIATRICS | Facility: CLINIC | Age: 70
End: 2025-07-30
Payer: COMMERCIAL

## 2025-07-30 DIAGNOSIS — Z86.0100 HISTORY OF COLONIC POLYPS: Primary | ICD-10-CM

## 2025-07-30 NOTE — TELEPHONE ENCOUNTER
Order/Referral Request    Who is requesting: patient    Orders being requested: Colonoscopy    Reason service is needed/diagnosis: screening    When are orders needed by: pt is due 10/26/25    Has this been discussed with Provider: No    Does patient have a preference on a Group/Provider/Facility?     Does patient have an appointment scheduled?: No    Where to send orders: Place orders within Epic    Could we send this information to you in [facility] or would you prefer to receive a phone call?:   Patient would prefer a phone call   Okay to leave a detailed message?: Yes at Cell number on file:    Telephone Information:   Mobile [phone]

## 2025-07-30 NOTE — TELEPHONE ENCOUNTER
Last AWV was on 11/12/24, has an upcoming appointment on 11/10/25. Last colonoscopy was done on 10/26/22 & recommended to repeat in 3 yrs. Pended referral, please review & sign if appropriate.     Emma DE LA CRUZ  Clinic RN  [facility]

## 2025-08-20 ENCOUNTER — TELEPHONE (OUTPATIENT)
Dept: GASTROENTEROLOGY | Facility: CLINIC | Age: 70
End: 2025-08-20
Payer: COMMERCIAL